# Patient Record
Sex: FEMALE | Race: OTHER | Employment: OTHER | URBAN - METROPOLITAN AREA
[De-identification: names, ages, dates, MRNs, and addresses within clinical notes are randomized per-mention and may not be internally consistent; named-entity substitution may affect disease eponyms.]

---

## 2019-03-13 ENCOUNTER — HOSPITAL ENCOUNTER (OUTPATIENT)
Age: 64
Discharge: HOME | End: 2019-03-13
Attending: EMERGENCY MEDICINE
Payer: MEDICARE

## 2019-03-13 ENCOUNTER — APPOINTMENT (OUTPATIENT)
Dept: GENERAL RADIOLOGY | Age: 64
End: 2019-03-13
Attending: EMERGENCY MEDICINE
Payer: MEDICARE

## 2019-03-13 VITALS
RESPIRATION RATE: 18 BRPM | DIASTOLIC BLOOD PRESSURE: 76 MMHG | HEART RATE: 72 BPM | SYSTOLIC BLOOD PRESSURE: 113 MMHG | TEMPERATURE: 98 F | WEIGHT: 174.63 LBS | OXYGEN SATURATION: 99 %

## 2019-03-13 DIAGNOSIS — S22.31XA CLOSED FRACTURE OF ONE RIB OF RIGHT SIDE, INITIAL ENCOUNTER: Primary | ICD-10-CM

## 2019-03-13 PROCEDURE — 71101 X-RAY EXAM UNILAT RIBS/CHEST: CPT | Performed by: EMERGENCY MEDICINE

## 2019-03-13 PROCEDURE — 99204 OFFICE O/P NEW MOD 45 MIN: CPT

## 2019-03-13 PROCEDURE — 99203 OFFICE O/P NEW LOW 30 MIN: CPT

## 2019-03-13 RX ORDER — LEVOTHYROXINE SODIUM 0.03 MG/1
TABLET ORAL
COMMUNITY
Start: 2018-12-31 | End: 2020-01-24

## 2019-03-13 RX ORDER — HYDROCODONE BITARTRATE AND ACETAMINOPHEN 10; 325 MG/1; MG/1
TABLET ORAL
COMMUNITY
Start: 2019-02-05 | End: 2020-01-24

## 2019-03-13 RX ORDER — FENOFIBRATE 145 MG/1
TABLET, COATED ORAL
COMMUNITY
Start: 2019-02-28 | End: 2020-01-24

## 2019-03-13 RX ORDER — PRAVASTATIN SODIUM 20 MG
TABLET ORAL
COMMUNITY
Start: 2018-11-15 | End: 2020-02-12

## 2019-03-13 RX ORDER — LISINOPRIL 20 MG/1
TABLET ORAL
COMMUNITY
Start: 2019-02-01 | End: 2020-01-24

## 2019-03-13 RX ORDER — DAPAGLIFLOZIN 10 MG/1
10 TABLET, FILM COATED ORAL DAILY
COMMUNITY
Start: 2019-03-05 | End: 2020-01-24

## 2019-03-13 RX ORDER — SITAGLIPTIN 100 MG/1
TABLET, FILM COATED ORAL
COMMUNITY
Start: 2019-02-02 | End: 2020-01-24

## 2019-03-13 RX ORDER — RANITIDINE 150 MG/1
150 TABLET ORAL NIGHTLY
COMMUNITY
Start: 2018-12-30 | End: 2021-02-22

## 2019-03-13 RX ORDER — ACETAMINOPHEN AND CODEINE PHOSPHATE 300; 30 MG/1; MG/1
1 TABLET ORAL EVERY 6 HOURS PRN
Qty: 12 TABLET | Refills: 0 | Status: SHIPPED | OUTPATIENT
Start: 2019-03-13 | End: 2019-03-20

## 2019-03-13 NOTE — ED PROVIDER NOTES
Patient Seen in: [facility] In [address] [place] [address]    History   Patient presents with:  Trauma (cardiovascular, musculoskeletal)    Stated Complaint: rt rib injury    HPI    Is a 59-year-old female who presents to immediate care complaining of air)       Current:/76   Pulse 72   Temp 98.4 °F (36.9 °C) (Oral)   Resp 18   Wt 79.2 kg   SpO2 99%         Physical Exam   Constitutional: She appears well-developed. HENT:   Head: Normocephalic and atraumatic.    Right Ear: External ear normal.

## 2020-01-24 ENCOUNTER — OFFICE VISIT (OUTPATIENT)
Dept: INTERNAL MEDICINE CLINIC | Facility: CLINIC | Age: 65
End: 2020-01-24
Payer: MEDICARE

## 2020-01-24 VITALS
HEART RATE: 71 BPM | TEMPERATURE: 98 F | DIASTOLIC BLOOD PRESSURE: 76 MMHG | HEIGHT: 61.5 IN | WEIGHT: 174 LBS | SYSTOLIC BLOOD PRESSURE: 127 MMHG | BODY MASS INDEX: 32.43 KG/M2

## 2020-01-24 DIAGNOSIS — Z13.6 SCREENING FOR CARDIOVASCULAR CONDITION: ICD-10-CM

## 2020-01-24 DIAGNOSIS — Z78.0 POSTMENOPAUSAL: ICD-10-CM

## 2020-01-24 DIAGNOSIS — K21.9 GASTROESOPHAGEAL REFLUX DISEASE WITHOUT ESOPHAGITIS: ICD-10-CM

## 2020-01-24 DIAGNOSIS — E11.9 TYPE 2 DIABETES MELLITUS WITHOUT COMPLICATION, WITHOUT LONG-TERM CURRENT USE OF INSULIN (HCC): ICD-10-CM

## 2020-01-24 DIAGNOSIS — Z12.4 SCREENING FOR CERVICAL CANCER: ICD-10-CM

## 2020-01-24 DIAGNOSIS — Z00.00 ENCOUNTER FOR ANNUAL HEALTH EXAMINATION: Primary | ICD-10-CM

## 2020-01-24 DIAGNOSIS — Z12.31 VISIT FOR SCREENING MAMMOGRAM: ICD-10-CM

## 2020-01-24 DIAGNOSIS — J41.0 SMOKERS' COUGH (HCC): Chronic | ICD-10-CM

## 2020-01-24 DIAGNOSIS — Z13.1 SCREENING FOR DIABETES MELLITUS (DM): ICD-10-CM

## 2020-01-24 PROBLEM — I10 ESSENTIAL HYPERTENSION: Status: ACTIVE | Noted: 2020-01-24

## 2020-01-24 PROBLEM — M19.90 ARTHRITIS: Status: ACTIVE | Noted: 2020-01-24

## 2020-01-24 PROCEDURE — 99386 PREV VISIT NEW AGE 40-64: CPT | Performed by: PHYSICIAN ASSISTANT

## 2020-01-24 PROCEDURE — G0439 PPPS, SUBSEQ VISIT: HCPCS | Performed by: PHYSICIAN ASSISTANT

## 2020-01-24 PROCEDURE — 96160 PT-FOCUSED HLTH RISK ASSMT: CPT | Performed by: PHYSICIAN ASSISTANT

## 2020-01-24 RX ORDER — GLUCOSAMINE HCL/CHONDROITIN SU 500-400 MG
CAPSULE ORAL
Qty: 100 STRIP | Refills: 11 | Status: SHIPPED | OUTPATIENT
Start: 2020-01-24 | End: 2020-04-15

## 2020-01-24 RX ORDER — FENOFIBRATE 145 MG/1
145 TABLET, COATED ORAL NIGHTLY
Qty: 90 TABLET | Refills: 0 | Status: SHIPPED | OUTPATIENT
Start: 2020-01-24 | End: 2020-05-06

## 2020-01-24 RX ORDER — HYDROCODONE BITARTRATE AND ACETAMINOPHEN 10; 325 MG/1; MG/1
1 TABLET ORAL EVERY 6 HOURS PRN
Qty: 30 TABLET | Refills: 0 | Status: SHIPPED | OUTPATIENT
Start: 2020-01-24 | End: 2020-04-03

## 2020-01-24 RX ORDER — LISINOPRIL 20 MG/1
20 TABLET ORAL DAILY
Qty: 90 TABLET | Refills: 0 | Status: SHIPPED | OUTPATIENT
Start: 2020-01-24 | End: 2020-06-30

## 2020-01-24 RX ORDER — LEVOTHYROXINE SODIUM 0.03 MG/1
25 TABLET ORAL
Qty: 90 TABLET | Refills: 0 | Status: SHIPPED | OUTPATIENT
Start: 2020-01-24 | End: 2020-05-06

## 2020-01-24 RX ORDER — DAPAGLIFLOZIN 10 MG/1
10 TABLET, FILM COATED ORAL DAILY
Qty: 90 TABLET | Refills: 0 | Status: SHIPPED | OUTPATIENT
Start: 2020-01-24 | End: 2020-05-06

## 2020-01-24 RX ORDER — SITAGLIPTIN 100 MG/1
100 TABLET, FILM COATED ORAL DAILY
Qty: 90 TABLET | Refills: 0 | Status: SHIPPED | OUTPATIENT
Start: 2020-01-24 | End: 2020-05-06

## 2020-01-24 RX ORDER — SYRING-NEEDL,DISP,INSUL,0.3 ML 30 GX5/16"
SYRINGE, EMPTY DISPOSABLE MISCELLANEOUS
Qty: 100 EACH | Refills: 11 | Status: SHIPPED | OUTPATIENT
Start: 2020-01-24

## 2020-01-24 NOTE — PROGRESS NOTES
HPI:   Ashish Palmer is a 59year old female who presents for a MA (Medicare Advantage) Supervisit (Once per calendar year). Patient presents new to the clinic for a physical exam.  Her previous physician has retired. Patient is overall doing well. tobacco: Never Used      Tobacco comment: 2 cig a week      This is a tobacco user, and I will give tobacco cessation counseling today.  (update Vitals or Tob Hx section to tim Tobacco Cessation counseling given as YES and refresh this link)          CAGE E11.9 non-insulin  raNITIdine HCl 150 MG Oral Tab,        MEDICAL INFORMATION:   She  has a past medical history of Arthritis, Esophageal reflux, Essential hypertension, Thyroid disease, and Type 1 diabetes mellitus ([facility] [address]     She  has a past surgical histo come from:  No I misunderstand some words in a sentence and need to ask people to repeat themselves:  No   I especially have trouble understanding the speech of women and children:  No I have trouble understanding the speaker in a large room such as at a m 10/13/2015   • Influenza 09/22/2014, 09/07/2016, 01/08/2018   • Pneumovax 23 08/28/2014, 09/07/2016        ASSESSMENT AND OTHER RELEVANT CHRONIC CONDITIONS:   Merissa West is a 59year old female who presents for a Medicare Assessment.      PLAN SUMMARY: metFORMIN HCl 1000 MG Oral Tab; Take 1 tablet (1,000 mg total) by mouth 2 (two) times daily with meals.  -     Glucose Blood (BLOOD GLUCOSE TEST) In Vitro Strip;  Check blood glucose once daily Dx E11.9, non-insulin  -     Blood Glucose Monitoring Suppl w/D Ophthalmology Visit Annually: Diabetics, FHx Glaucoma, AA>50, > 65 No flowsheet data found. Bone Density Screening      Dexascan Every two years No results found for this or any previous visit. No flowsheet data found.     Pap and Pelvic      Pap No flowsheet data found. Creatinine  Annually No results found for: CREATSERUM No flowsheet data found. Drug Serum Conc  Annually No results found for: DIGOXIN, DIG, VALP No flowsheet data found.           COPD      Spirometry Testing Annually Spirome

## 2020-01-24 NOTE — PATIENT INSTRUCTIONS
Jesenia Nelson's SCREENING SCHEDULE   Tests on this list are recommended by your physician but may not be covered, or covered at this frequency, by your insurer. Please check with your insurance carrier before scheduling to verify coverage.    PREVENTATI found for this or any previous visit. No flowsheet data found. Fecal Occult Blood   Covered Annually No results found for: FOB, OCCULTSTOOL No flowsheet data found.      Barium Enema-   uncomfortable but covered  Covered but uncomfortable   Glaucoma Scr factors:   End-stage renal disease   Hemophiliacs who received Factor VIII or IX concentrates   Clients of institutions for the mentally retarded   Persons who live in the same house as a HepB virus carrier   Homosexual men   Illicit injectable drug abuser

## 2020-01-25 ENCOUNTER — LAB ENCOUNTER (OUTPATIENT)
Dept: LAB | Age: 65
End: 2020-01-25
Attending: PHYSICIAN ASSISTANT
Payer: MEDICARE

## 2020-01-25 DIAGNOSIS — E11.9 TYPE 2 DIABETES MELLITUS WITHOUT COMPLICATION, WITHOUT LONG-TERM CURRENT USE OF INSULIN (HCC): ICD-10-CM

## 2020-01-25 DIAGNOSIS — Z13.1 SCREENING FOR DIABETES MELLITUS (DM): ICD-10-CM

## 2020-01-25 DIAGNOSIS — Z13.6 SCREENING FOR CARDIOVASCULAR CONDITION: ICD-10-CM

## 2020-01-25 DIAGNOSIS — Z00.00 ENCOUNTER FOR ANNUAL HEALTH EXAMINATION: ICD-10-CM

## 2020-01-25 LAB
ALBUMIN SERPL-MCNC: 4.1 G/DL (ref 3.4–5)
ALBUMIN/GLOB SERPL: 1.1 {RATIO} (ref 1–2)
ALP LIVER SERPL-CCNC: 72 U/L (ref 50–130)
ALT SERPL-CCNC: 41 U/L (ref 13–56)
ANION GAP SERPL CALC-SCNC: 5 MMOL/L (ref 0–18)
AST SERPL-CCNC: 30 U/L (ref 15–37)
BASOPHILS # BLD AUTO: 0.04 X10(3) UL (ref 0–0.2)
BASOPHILS NFR BLD AUTO: 0.6 %
BILIRUB SERPL-MCNC: 0.5 MG/DL (ref 0.1–2)
BUN BLD-MCNC: 13 MG/DL (ref 7–18)
BUN/CREAT SERPL: 17.6 (ref 10–20)
CALCIUM BLD-MCNC: 9.4 MG/DL (ref 8.5–10.1)
CHLORIDE SERPL-SCNC: 106 MMOL/L (ref 98–112)
CHOLEST SMN-MCNC: 159 MG/DL (ref ?–200)
CO2 SERPL-SCNC: 29 MMOL/L (ref 21–32)
CREAT BLD-MCNC: 0.74 MG/DL (ref 0.55–1.02)
CREAT UR-SCNC: 97.1 MG/DL
DEPRECATED RDW RBC AUTO: 43.8 FL (ref 35.1–46.3)
EOSINOPHIL # BLD AUTO: 0.14 X10(3) UL (ref 0–0.7)
EOSINOPHIL NFR BLD AUTO: 2.3 %
ERYTHROCYTE [DISTWIDTH] IN BLOOD BY AUTOMATED COUNT: 13.3 % (ref 11–15)
EST. AVERAGE GLUCOSE BLD GHB EST-MCNC: 143 MG/DL (ref 68–126)
GLOBULIN PLAS-MCNC: 3.6 G/DL (ref 2.8–4.4)
GLUCOSE BLD-MCNC: 126 MG/DL (ref 70–99)
HBA1C MFR BLD HPLC: 6.6 % (ref ?–5.7)
HCT VFR BLD AUTO: 43.6 % (ref 35–48)
HDLC SERPL-MCNC: 40 MG/DL (ref 40–59)
HGB BLD-MCNC: 14.4 G/DL (ref 12–16)
IMM GRANULOCYTES # BLD AUTO: 0.05 X10(3) UL (ref 0–1)
IMM GRANULOCYTES NFR BLD: 0.8 %
LDLC SERPL CALC-MCNC: 76 MG/DL (ref ?–100)
LYMPHOCYTES # BLD AUTO: 2.13 X10(3) UL (ref 1–4)
LYMPHOCYTES NFR BLD AUTO: 34.5 %
M PROTEIN MFR SERPL ELPH: 7.7 G/DL (ref 6.4–8.2)
MCH RBC QN AUTO: 29.8 PG (ref 26–34)
MCHC RBC AUTO-ENTMCNC: 33 G/DL (ref 31–37)
MCV RBC AUTO: 90.3 FL (ref 80–100)
MICROALBUMIN UR-MCNC: 0.84 MG/DL
MICROALBUMIN/CREAT 24H UR-RTO: 8.7 UG/MG (ref ?–30)
MONOCYTES # BLD AUTO: 0.48 X10(3) UL (ref 0.1–1)
MONOCYTES NFR BLD AUTO: 7.8 %
NEUTROPHILS # BLD AUTO: 3.33 X10 (3) UL (ref 1.5–7.7)
NEUTROPHILS # BLD AUTO: 3.33 X10(3) UL (ref 1.5–7.7)
NEUTROPHILS NFR BLD AUTO: 54 %
NONHDLC SERPL-MCNC: 119 MG/DL (ref ?–130)
OSMOLALITY SERPL CALC.SUM OF ELEC: 292 MOSM/KG (ref 275–295)
PATIENT FASTING Y/N/NP: YES
PATIENT FASTING Y/N/NP: YES
PLATELET # BLD AUTO: 234 10(3)UL (ref 150–450)
POTASSIUM SERPL-SCNC: 4.1 MMOL/L (ref 3.5–5.1)
RBC # BLD AUTO: 4.83 X10(6)UL (ref 3.8–5.3)
SODIUM SERPL-SCNC: 140 MMOL/L (ref 136–145)
TRIGL SERPL-MCNC: 214 MG/DL (ref 30–149)
TSI SER-ACNC: 3.71 MIU/ML (ref 0.36–3.74)
VLDLC SERPL CALC-MCNC: 43 MG/DL (ref 0–30)
WBC # BLD AUTO: 6.2 X10(3) UL (ref 4–11)

## 2020-01-25 PROCEDURE — 85025 COMPLETE CBC W/AUTO DIFF WBC: CPT

## 2020-01-25 PROCEDURE — 80053 COMPREHEN METABOLIC PANEL: CPT

## 2020-01-25 PROCEDURE — 83036 HEMOGLOBIN GLYCOSYLATED A1C: CPT

## 2020-01-25 PROCEDURE — 82043 UR ALBUMIN QUANTITATIVE: CPT

## 2020-01-25 PROCEDURE — 36415 COLL VENOUS BLD VENIPUNCTURE: CPT

## 2020-01-25 PROCEDURE — 84443 ASSAY THYROID STIM HORMONE: CPT

## 2020-01-25 PROCEDURE — 82570 ASSAY OF URINE CREATININE: CPT

## 2020-01-25 PROCEDURE — 80061 LIPID PANEL: CPT

## 2020-02-12 ENCOUNTER — OFFICE VISIT (OUTPATIENT)
Dept: OBGYN CLINIC | Facility: CLINIC | Age: 65
End: 2020-02-12
Payer: MEDICARE

## 2020-02-12 VITALS
HEIGHT: 61.5 IN | WEIGHT: 171.38 LBS | DIASTOLIC BLOOD PRESSURE: 70 MMHG | BODY MASS INDEX: 31.94 KG/M2 | SYSTOLIC BLOOD PRESSURE: 112 MMHG

## 2020-02-12 DIAGNOSIS — Z01.419 WELL WOMAN EXAM WITH ROUTINE GYNECOLOGICAL EXAM: Primary | ICD-10-CM

## 2020-02-12 DIAGNOSIS — Z01.419 ENCOUNTER FOR GYNECOLOGICAL EXAMINATION WITHOUT ABNORMAL FINDING: ICD-10-CM

## 2020-02-12 PROCEDURE — G0101 CA SCREEN;PELVIC/BREAST EXAM: HCPCS | Performed by: OBSTETRICS & GYNECOLOGY

## 2020-02-12 RX ORDER — LANCETS
EACH MISCELLANEOUS
COMMUNITY
Start: 2020-01-24 | End: 2020-06-30

## 2020-02-12 NOTE — PROGRESS NOTES
HPI:    Patient ID: Raghav Tate is a 59year old female. Patient here for routine exam.  No C/Os. New to office. Patient states that she has neve had an abnormal pap smear. Discussed that if this pap is WNL then it will be her last pap.   Has Mamm Exam   Constitutional: She is oriented to person, place, and time. She appears well-developed and well-nourished. HENT:   Head: Normocephalic. Neck: Normal range of motion. Neck supple. No thyromegaly present.    Cardiovascular: Normal rate, regular rhy

## 2020-02-13 LAB — HPV I/H RISK 1 DNA SPEC QL NAA+PROBE: NEGATIVE

## 2020-03-14 ENCOUNTER — HOSPITAL ENCOUNTER (EMERGENCY)
Facility: HOSPITAL | Age: 65
Discharge: STILL ADMITTED OR ADMINISTRATIVE | End: 2020-03-14
Payer: MEDICARE

## 2020-03-17 ENCOUNTER — HOSPITAL ENCOUNTER (OUTPATIENT)
Dept: BONE DENSITY | Facility: HOSPITAL | Age: 65
Discharge: HOME | End: 2020-03-17
Attending: PHYSICIAN ASSISTANT
Payer: MEDICARE

## 2020-03-17 ENCOUNTER — HOSPITAL ENCOUNTER (OUTPATIENT)
Dept: MAMMOGRAPHY | Facility: HOSPITAL | Age: 65
Discharge: HOME | End: 2020-03-17
Attending: PHYSICIAN ASSISTANT
Payer: MEDICARE

## 2020-03-17 DIAGNOSIS — Z78.0 POSTMENOPAUSAL: ICD-10-CM

## 2020-03-17 DIAGNOSIS — Z12.31 VISIT FOR SCREENING MAMMOGRAM: ICD-10-CM

## 2020-03-17 PROCEDURE — 77063 BREAST TOMOSYNTHESIS BI: CPT | Performed by: PHYSICIAN ASSISTANT

## 2020-03-17 PROCEDURE — 77067 SCR MAMMO BI INCL CAD: CPT | Performed by: PHYSICIAN ASSISTANT

## 2020-03-17 PROCEDURE — 77080 DXA BONE DENSITY AXIAL: CPT | Performed by: PHYSICIAN ASSISTANT

## 2020-03-24 ENCOUNTER — TELEPHONE (OUTPATIENT)
Dept: INTERNAL MEDICINE CLINIC | Facility: CLINIC | Age: 65
End: 2020-03-24

## 2020-03-24 DIAGNOSIS — R39.9 UTI SYMPTOMS: Primary | ICD-10-CM

## 2020-03-24 NOTE — TELEPHONE ENCOUNTER
Spoke with patient, relayed message below from PCP. Patient verbalized understanding in agreement with plan. Will obtain previous mammogram and take record to our mammogram dept.

## 2020-03-24 NOTE — TELEPHONE ENCOUNTER
Contacted patient name and  verified, relayed message below to patient. Patient states will try and obtain records from the [facility] she had her last mammogram at. Per patient will drop off records as soon as she obtains them.        While on

## 2020-03-24 NOTE — TELEPHONE ENCOUNTER
Notes recorded by Melissa Jones PA-C on 3/17/2020 at 1:02 PM CDT  Please call pt and see if she can get her old imaging to the mammogram department for comparison

## 2020-03-24 NOTE — TELEPHONE ENCOUNTER
Pt does not need apt, advise to leave urine sample at open lab, I will check and call pt. Drink plenty of water. Mammogram records need to be sent to our mammogram dept for comparison.

## 2020-04-03 RX ORDER — HYDROCODONE BITARTRATE AND ACETAMINOPHEN 10; 325 MG/1; MG/1
1 TABLET ORAL EVERY 6 HOURS PRN
Qty: 30 TABLET | Refills: 0 | Status: SHIPPED | OUTPATIENT
Start: 2020-04-03 | End: 2020-05-15

## 2020-04-03 NOTE — TELEPHONE ENCOUNTER
This is being sent to you without review by the Triage staff due to the high call volumes created by the COVID-19 virus, per the email sent by Dr. Sammi Gardner on 3/19/20. Thank you for your support.     Centralized Nurse Triage Team

## 2020-04-03 NOTE — TELEPHONE ENCOUNTER
Patient speaks Nepali. Patient requesting a refill. HYDROcodone-acetaminophen  MG Oral Tab 30 tablet 0 1/24/2020    Sig:   Take 1 tablet by mouth every 6 (six) hours as needed for Pain.      Route:   Oral     PRN Reason(s):   Pain     Earliest

## 2020-04-06 ENCOUNTER — NURSE TRIAGE (OUTPATIENT)
Dept: INTERNAL MEDICINE CLINIC | Facility: CLINIC | Age: 65
End: 2020-04-06

## 2020-04-06 NOTE — TELEPHONE ENCOUNTER
I have tried to reach pt, called twice. Will try again in the office this after noon.  If she does call back please advise for her to stay isolation for the next 14 days, she does not qualify for testing but can presume positive, monitor sx and if worsen ca

## 2020-04-06 NOTE — TELEPHONE ENCOUNTER
Action Requested: Summary for Provider     []  Critical Lab, Recommendations Needed  [] Need Additional Advice  []   FYI    []   Need Orders  [] Need Medications Sent to Pharmacy  []  Other     SUMMARY: A Moncho, please advise.     Patient's symptoms indicat AND [3] NO cough or fever or breathing difficulty    Protocols used: CORONAVIRUS (COVID-19) EXPOSURE-A-OH

## 2020-04-14 ENCOUNTER — TELEPHONE (OUTPATIENT)
Dept: INTERNAL MEDICINE CLINIC | Facility: CLINIC | Age: 65
End: 2020-04-14

## 2020-04-15 RX ORDER — GLUCOSAMINE HCL/CHONDROITIN SU 500-400 MG
CAPSULE ORAL
Qty: 100 STRIP | Refills: 11 | Status: SHIPPED | OUTPATIENT
Start: 2020-04-15 | End: 2021-02-22

## 2020-04-15 NOTE — TELEPHONE ENCOUNTER
Spoke to pt and discussed that she tested covid positive. She is asx, discussed staying home.  Also needed glucose strips which I sent

## 2020-05-06 ENCOUNTER — TELEPHONE (OUTPATIENT)
Dept: INTERNAL MEDICINE CLINIC | Facility: CLINIC | Age: 65
End: 2020-05-06

## 2020-05-06 RX ORDER — DAPAGLIFLOZIN 10 MG/1
10 TABLET, FILM COATED ORAL DAILY
Qty: 90 TABLET | Refills: 0 | Status: SHIPPED | OUTPATIENT
Start: 2020-05-06 | End: 2020-10-06

## 2020-05-06 RX ORDER — SITAGLIPTIN 100 MG/1
100 TABLET, FILM COATED ORAL DAILY
Qty: 90 TABLET | Refills: 0 | Status: SHIPPED | OUTPATIENT
Start: 2020-05-06 | End: 2020-10-06

## 2020-05-06 RX ORDER — LEVOTHYROXINE SODIUM 0.03 MG/1
25 TABLET ORAL
Qty: 90 TABLET | Refills: 0 | Status: SHIPPED | OUTPATIENT
Start: 2020-05-06 | End: 2020-12-11

## 2020-05-06 RX ORDER — FENOFIBRATE 145 MG/1
145 TABLET, COATED ORAL NIGHTLY
Qty: 90 TABLET | Refills: 0 | Status: SHIPPED | OUTPATIENT
Start: 2020-05-06 | End: 2020-10-07

## 2020-05-06 NOTE — TELEPHONE ENCOUNTER
Current Outpatient Medications:   •  FARXIGA 10 MG Oral Tab, Take 10 mg by mouth daily. , Disp: 90 tablet, Rfl: 0  •  Fenofibrate 145 MG Oral Tab, Take 1 tablet (145 mg total) by mouth nightly., Disp: 90 tablet, Rfl: 0  •  Levothyroxine Sodium 25 MCG

## 2020-05-15 ENCOUNTER — TELEPHONE (OUTPATIENT)
Dept: INTERNAL MEDICINE CLINIC | Facility: CLINIC | Age: 65
End: 2020-05-15

## 2020-05-15 RX ORDER — HYDROCODONE BITARTRATE AND ACETAMINOPHEN 10; 325 MG/1; MG/1
1 TABLET ORAL EVERY 6 HOURS PRN
Qty: 30 TABLET | Refills: 0 | Status: SHIPPED | OUTPATIENT
Start: 2020-05-15 | End: 2020-09-14

## 2020-05-15 NOTE — TELEPHONE ENCOUNTER
Patient requesting refill, also states she missed call from PA, no notes on chart.  Please advice     HYDROcodone-acetaminophen  MG Oral Tab

## 2020-05-28 ENCOUNTER — TELEPHONE (OUTPATIENT)
Dept: INTERNAL MEDICINE CLINIC | Facility: CLINIC | Age: 65
End: 2020-05-28

## 2020-05-28 NOTE — TELEPHONE ENCOUNTER
Patient states she has gone to pharmacy twice and they told her they did not have medication, asking if it can be re sent.      HYDROcodone-acetaminophen  MG Oral Tab

## 2020-05-29 ENCOUNTER — HOSPITAL ENCOUNTER (OUTPATIENT)
Dept: MAMMOGRAPHY | Facility: HOSPITAL | Age: 65
Discharge: HOME | End: 2020-05-29
Attending: PHYSICIAN ASSISTANT
Payer: MEDICARE

## 2020-05-29 DIAGNOSIS — R92.8 ABNORMAL MAMMOGRAM: ICD-10-CM

## 2020-05-29 PROCEDURE — 77065 DX MAMMO INCL CAD UNI: CPT | Performed by: PHYSICIAN ASSISTANT

## 2020-05-29 PROCEDURE — 77061 BREAST TOMOSYNTHESIS UNI: CPT | Performed by: PHYSICIAN ASSISTANT

## 2020-06-30 ENCOUNTER — TELEPHONE (OUTPATIENT)
Dept: INTERNAL MEDICINE CLINIC | Facility: CLINIC | Age: 65
End: 2020-06-30

## 2020-06-30 RX ORDER — LANCETS
EACH MISCELLANEOUS
Qty: 100 EACH | Refills: 0 | Status: SHIPPED | OUTPATIENT
Start: 2020-06-30 | End: 2020-08-15

## 2020-06-30 RX ORDER — LISINOPRIL 20 MG/1
20 TABLET ORAL DAILY
Qty: 90 TABLET | Refills: 0 | Status: SHIPPED | OUTPATIENT
Start: 2020-06-30 | End: 2020-08-23

## 2020-06-30 NOTE — TELEPHONE ENCOUNTER
90 day supply. Current Outpatient Medications:   •  •  lisinopril 20 MG Oral Tab, Take 1 tablet (20 mg total) by mouth daily. , Disp: 90 tablet, Rfl: 0

## 2020-08-15 RX ORDER — LANCETS
EACH MISCELLANEOUS
Qty: 100 EACH | Refills: 3 | Status: SHIPPED | OUTPATIENT
Start: 2020-08-15 | End: 2020-12-18

## 2020-08-15 NOTE — TELEPHONE ENCOUNTER
Diabetic Supplies  Protocol Criteria:  · Appointment scheduled in past 12 months or the next 3 months  1/24/20

## 2020-08-23 RX ORDER — LISINOPRIL 20 MG/1
TABLET ORAL
Qty: 30 TABLET | Refills: 0 | Status: SHIPPED | OUTPATIENT
Start: 2020-08-23 | End: 2020-12-29

## 2020-09-14 ENCOUNTER — OFFICE VISIT (OUTPATIENT)
Dept: FAMILY MEDICINE CLINIC | Facility: CLINIC | Age: 65
End: 2020-09-14
Payer: MEDICARE

## 2020-09-14 VITALS
TEMPERATURE: 97 F | HEIGHT: 61.5 IN | BODY MASS INDEX: 33.18 KG/M2 | HEART RATE: 67 BPM | WEIGHT: 178 LBS | DIASTOLIC BLOOD PRESSURE: 78 MMHG | SYSTOLIC BLOOD PRESSURE: 135 MMHG

## 2020-09-14 DIAGNOSIS — M19.90 ARTHRITIS: ICD-10-CM

## 2020-09-14 DIAGNOSIS — I10 ESSENTIAL HYPERTENSION: ICD-10-CM

## 2020-09-14 DIAGNOSIS — E03.9 HYPOTHYROIDISM, UNSPECIFIED TYPE: ICD-10-CM

## 2020-09-14 DIAGNOSIS — Z76.89 ENCOUNTER TO ESTABLISH CARE: Primary | ICD-10-CM

## 2020-09-14 DIAGNOSIS — E11.9 TYPE 2 DIABETES MELLITUS WITHOUT COMPLICATION, WITHOUT LONG-TERM CURRENT USE OF INSULIN (HCC): ICD-10-CM

## 2020-09-14 PROBLEM — E07.9 THYROID DISEASE: Status: ACTIVE | Noted: 2020-09-14

## 2020-09-14 PROBLEM — E03.8 OTHER SPECIFIED HYPOTHYROIDISM: Status: ACTIVE | Noted: 2020-09-14

## 2020-09-14 PROBLEM — E07.9 THYROID DISEASE: Chronic | Status: ACTIVE | Noted: 2020-09-14

## 2020-09-14 PROBLEM — E78.2 MIXED HYPERLIPIDEMIA: Chronic | Status: ACTIVE | Noted: 2020-09-14

## 2020-09-14 PROBLEM — K21.9 ESOPHAGEAL REFLUX: Status: ACTIVE | Noted: 2020-01-24

## 2020-09-14 PROBLEM — E78.2 MIXED HYPERLIPIDEMIA: Status: ACTIVE | Noted: 2020-09-14

## 2020-09-14 PROCEDURE — 99204 OFFICE O/P NEW MOD 45 MIN: CPT | Performed by: FAMILY MEDICINE

## 2020-09-14 PROCEDURE — 3008F BODY MASS INDEX DOCD: CPT | Performed by: FAMILY MEDICINE

## 2020-09-14 PROCEDURE — 3078F DIAST BP <80 MM HG: CPT | Performed by: FAMILY MEDICINE

## 2020-09-14 PROCEDURE — 3075F SYST BP GE 130 - 139MM HG: CPT | Performed by: FAMILY MEDICINE

## 2020-09-14 RX ORDER — PRAVASTATIN SODIUM 20 MG
20 TABLET ORAL NIGHTLY
COMMUNITY
Start: 2020-08-24 | End: 2021-07-22

## 2020-09-14 RX ORDER — HYDROCODONE BITARTRATE AND ACETAMINOPHEN 10; 325 MG/1; MG/1
1 TABLET ORAL EVERY 6 HOURS PRN
Qty: 30 TABLET | Refills: 0 | Status: SHIPPED | OUTPATIENT
Start: 2020-09-14 | End: 2020-12-11

## 2020-09-14 NOTE — PROGRESS NOTES
NEW PT.    HPI:   Luciano Daily is a 72year old female who presents for an establish care visit. Patient has a history of arthritis, hypertension, hyperlipidemia, type 2 diabetes and reflux.     She states that she is compliant with taking her medicatio Past Medical History:   Diagnosis Date   • Arthritis    • Esophageal reflux    • Essential hypertension    • Thyroid disease    • Type 2 diabetes mellitus without complication, without long-term current use of insulin ([facility] 1/24/2020      Past Surgica hypertension  -Blood pressure well controlled today. However patient states she did not take her blood pressure medication today    3.  Type 2 diabetes mellitus without complication, without long-term current use of insulin (HCC)  - COMP METABOLIC PANEL (1

## 2020-09-16 ENCOUNTER — LAB ENCOUNTER (OUTPATIENT)
Dept: LAB | Age: 65
End: 2020-09-16
Attending: FAMILY MEDICINE
Payer: MEDICARE

## 2020-09-16 DIAGNOSIS — E03.9 HYPOTHYROIDISM, UNSPECIFIED TYPE: ICD-10-CM

## 2020-09-16 DIAGNOSIS — E11.9 TYPE 2 DIABETES MELLITUS WITHOUT COMPLICATION, WITHOUT LONG-TERM CURRENT USE OF INSULIN (HCC): ICD-10-CM

## 2020-09-16 LAB
ALBUMIN SERPL-MCNC: 3.9 G/DL (ref 3.4–5)
ALBUMIN/GLOB SERPL: 1.1 {RATIO} (ref 1–2)
ALP LIVER SERPL-CCNC: 93 U/L (ref 50–130)
ALT SERPL-CCNC: 47 U/L (ref 13–56)
ANION GAP SERPL CALC-SCNC: 7 MMOL/L (ref 0–18)
AST SERPL-CCNC: 35 U/L (ref 15–37)
BILIRUB SERPL-MCNC: 0.4 MG/DL (ref 0.1–2)
BUN BLD-MCNC: 11 MG/DL (ref 7–18)
BUN/CREAT SERPL: 15.9 (ref 10–20)
CALCIUM BLD-MCNC: 9.4 MG/DL (ref 8.5–10.1)
CHLORIDE SERPL-SCNC: 106 MMOL/L (ref 98–112)
CHOLEST SMN-MCNC: 158 MG/DL (ref ?–200)
CO2 SERPL-SCNC: 26 MMOL/L (ref 21–32)
CREAT BLD-MCNC: 0.69 MG/DL (ref 0.55–1.02)
CREAT UR-SCNC: 78 MG/DL
EST. AVERAGE GLUCOSE BLD GHB EST-MCNC: 120 MG/DL (ref 68–126)
GLOBULIN PLAS-MCNC: 3.7 G/DL (ref 2.8–4.4)
GLUCOSE BLD-MCNC: 109 MG/DL (ref 70–99)
HBA1C MFR BLD HPLC: 5.8 % (ref ?–5.7)
HDLC SERPL-MCNC: 40 MG/DL (ref 40–59)
LDLC SERPL CALC-MCNC: 75 MG/DL (ref ?–100)
M PROTEIN MFR SERPL ELPH: 7.6 G/DL (ref 6.4–8.2)
MICROALBUMIN UR-MCNC: 0.73 MG/DL
MICROALBUMIN/CREAT 24H UR-RTO: 9.4 UG/MG (ref ?–30)
NONHDLC SERPL-MCNC: 118 MG/DL (ref ?–130)
OSMOLALITY SERPL CALC.SUM OF ELEC: 288 MOSM/KG (ref 275–295)
PATIENT FASTING Y/N/NP: YES
PATIENT FASTING Y/N/NP: YES
POTASSIUM SERPL-SCNC: 4 MMOL/L (ref 3.5–5.1)
SODIUM SERPL-SCNC: 139 MMOL/L (ref 136–145)
T4 FREE SERPL-MCNC: 1 NG/DL (ref 0.8–1.7)
TRIGL SERPL-MCNC: 214 MG/DL (ref 30–149)
TSI SER-ACNC: 4.63 MIU/ML (ref 0.36–3.74)
VLDLC SERPL CALC-MCNC: 43 MG/DL (ref 0–30)

## 2020-09-16 PROCEDURE — 36415 COLL VENOUS BLD VENIPUNCTURE: CPT

## 2020-09-16 PROCEDURE — 82570 ASSAY OF URINE CREATININE: CPT

## 2020-09-16 PROCEDURE — 80053 COMPREHEN METABOLIC PANEL: CPT

## 2020-09-16 PROCEDURE — 84439 ASSAY OF FREE THYROXINE: CPT

## 2020-09-16 PROCEDURE — 84443 ASSAY THYROID STIM HORMONE: CPT

## 2020-09-16 PROCEDURE — 80061 LIPID PANEL: CPT

## 2020-09-16 PROCEDURE — 83036 HEMOGLOBIN GLYCOSYLATED A1C: CPT

## 2020-09-16 PROCEDURE — 82043 UR ALBUMIN QUANTITATIVE: CPT

## 2020-09-18 ENCOUNTER — TELEPHONE (OUTPATIENT)
Dept: FAMILY MEDICINE CLINIC | Facility: CLINIC | Age: 65
End: 2020-09-18

## 2020-09-18 RX ORDER — NITROFURANTOIN 25; 75 MG/1; MG/1
100 CAPSULE ORAL 2 TIMES DAILY
Qty: 10 CAPSULE | Refills: 0 | Status: SHIPPED | OUTPATIENT
Start: 2020-09-18 | End: 2020-09-23

## 2020-09-18 NOTE — TELEPHONE ENCOUNTER
Called patient about lab work. States that she is having some dysuria and taking Azo which helps. Advised her that I will send Macrobid to the pharmacy for her to start taking.   The next time she has a UTI/urinary symptoms needs to come in for a urine cu

## 2020-10-05 ENCOUNTER — LAB ENCOUNTER (OUTPATIENT)
Dept: LAB | Facility: HOSPITAL | Age: 65
End: 2020-10-05
Attending: INTERNAL MEDICINE
Payer: MEDICARE

## 2020-10-05 ENCOUNTER — OFFICE VISIT (OUTPATIENT)
Dept: RHEUMATOLOGY | Facility: CLINIC | Age: 65
End: 2020-10-05
Payer: MEDICARE

## 2020-10-05 ENCOUNTER — HOSPITAL ENCOUNTER (OUTPATIENT)
Dept: GENERAL RADIOLOGY | Facility: HOSPITAL | Age: 65
Discharge: HOME | End: 2020-10-05
Attending: INTERNAL MEDICINE
Payer: MEDICARE

## 2020-10-05 VITALS
SYSTOLIC BLOOD PRESSURE: 107 MMHG | HEIGHT: 61.5 IN | WEIGHT: 178.5 LBS | BODY MASS INDEX: 33.27 KG/M2 | HEART RATE: 79 BPM | DIASTOLIC BLOOD PRESSURE: 74 MMHG

## 2020-10-05 DIAGNOSIS — M10.09 ACUTE IDIOPATHIC GOUT OF MULTIPLE SITES: ICD-10-CM

## 2020-10-05 DIAGNOSIS — M25.50 ARTHRALGIA, UNSPECIFIED JOINT: ICD-10-CM

## 2020-10-05 DIAGNOSIS — M13.0 POLYARTHRITIS: Primary | ICD-10-CM

## 2020-10-05 DIAGNOSIS — M13.0 POLYARTHRITIS: ICD-10-CM

## 2020-10-05 PROCEDURE — 36415 COLL VENOUS BLD VENIPUNCTURE: CPT

## 2020-10-05 PROCEDURE — 85652 RBC SED RATE AUTOMATED: CPT

## 2020-10-05 PROCEDURE — 86200 CCP ANTIBODY: CPT

## 2020-10-05 PROCEDURE — 3008F BODY MASS INDEX DOCD: CPT | Performed by: INTERNAL MEDICINE

## 2020-10-05 PROCEDURE — 84550 ASSAY OF BLOOD/URIC ACID: CPT

## 2020-10-05 PROCEDURE — 3078F DIAST BP <80 MM HG: CPT | Performed by: INTERNAL MEDICINE

## 2020-10-05 PROCEDURE — 86140 C-REACTIVE PROTEIN: CPT

## 2020-10-05 PROCEDURE — 86431 RHEUMATOID FACTOR QUANT: CPT

## 2020-10-05 PROCEDURE — 99204 OFFICE O/P NEW MOD 45 MIN: CPT | Performed by: INTERNAL MEDICINE

## 2020-10-05 PROCEDURE — 3074F SYST BP LT 130 MM HG: CPT | Performed by: INTERNAL MEDICINE

## 2020-10-05 PROCEDURE — 73130 X-RAY EXAM OF HAND: CPT | Performed by: INTERNAL MEDICINE

## 2020-10-05 RX ORDER — DICLOFENAC SODIUM 75 MG/1
TABLET, DELAYED RELEASE ORAL 2 TIMES DAILY
COMMUNITY
Start: 2020-09-11 | End: 2020-10-19

## 2020-10-05 NOTE — PROGRESS NOTES
Dear Dr. Lili Mistry:    I saw your patient Pieter Gordillo in consultation this afternoon at your request for evaluation of polyarthritis. 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   An  was on the line during my his infrequently, levothyroxine, lisinopril, metformin, pravastatin, ranitidine, sitagliptin. She is intolerant of ibuprofen which caused bruising of her eyelids and upset her stomach. Family history:  She has not hands with bad arthritis.   She does not kn move well. Surgical scars over both knees with good flexion. Ankles move okay. The balls of her feet are nontender to squeeze or not obviously inflamed. Assessment and plan:    1. Diffuse osteoarthritis. Well-documented.   I gave her an article in S

## 2020-10-05 NOTE — TELEPHONE ENCOUNTER
Patient is requesting a refill. JANUVIA 100 MG Oral Tab 90 tablet 0 5/6/2020    Sig:   Take 1 tablet (100 mg total) by mouth daily.      Route: Thom Mcdonald     Order #:   [id number]       metFORMIN HCl 1000 MG Oral Tab 180 tablet 0 5/6/2020    S

## 2020-10-06 RX ORDER — SITAGLIPTIN 100 MG/1
100 TABLET, FILM COATED ORAL DAILY
Qty: 90 TABLET | Refills: 1 | Status: SHIPPED | OUTPATIENT
Start: 2020-10-06 | End: 2021-02-22

## 2020-10-06 RX ORDER — DAPAGLIFLOZIN 10 MG/1
10 TABLET, FILM COATED ORAL DAILY
Qty: 90 TABLET | Refills: 1 | Status: SHIPPED | OUTPATIENT
Start: 2020-10-06 | End: 2021-02-22

## 2020-10-07 ENCOUNTER — TELEPHONE (OUTPATIENT)
Dept: FAMILY MEDICINE CLINIC | Facility: CLINIC | Age: 65
End: 2020-10-07

## 2020-10-07 RX ORDER — FENOFIBRATE 145 MG/1
145 TABLET, COATED ORAL NIGHTLY
Qty: 90 TABLET | Refills: 1 | Status: SHIPPED | OUTPATIENT
Start: 2020-10-07 | End: 2021-09-22

## 2020-10-07 NOTE — TELEPHONE ENCOUNTER
Patient states she was advised by her healthcare plan that  is not in network with them, clinic is but not Dr. Karen Steward if there is something that can be done. State she really likes  and would not like to change provider. Was given a phone number  can call to get more information [phone].  Please advise

## 2020-10-08 NOTE — TELEPHONE ENCOUNTER
Phone number given is a series of offers for home warranty, winning gift cards, and other prices. Per MDs profile she accepts [employer] MA HMO, [employer] and [employer] PPO. Patient has been seen before and there were no issues then.

## 2020-10-19 ENCOUNTER — OFFICE VISIT (OUTPATIENT)
Dept: RHEUMATOLOGY | Facility: CLINIC | Age: 65
End: 2020-10-19
Payer: MEDICARE

## 2020-10-19 VITALS
HEIGHT: 61.5 IN | WEIGHT: 178.19 LBS | HEART RATE: 64 BPM | DIASTOLIC BLOOD PRESSURE: 72 MMHG | BODY MASS INDEX: 33.21 KG/M2 | SYSTOLIC BLOOD PRESSURE: 109 MMHG

## 2020-10-19 DIAGNOSIS — M15.9 PRIMARY OSTEOARTHRITIS INVOLVING MULTIPLE JOINTS: Primary | ICD-10-CM

## 2020-10-19 PROCEDURE — 99213 OFFICE O/P EST LOW 20 MIN: CPT | Performed by: INTERNAL MEDICINE

## 2020-10-19 PROCEDURE — 3008F BODY MASS INDEX DOCD: CPT | Performed by: INTERNAL MEDICINE

## 2020-10-19 PROCEDURE — 3078F DIAST BP <80 MM HG: CPT | Performed by: INTERNAL MEDICINE

## 2020-10-19 PROCEDURE — 3074F SYST BP LT 130 MM HG: CPT | Performed by: INTERNAL MEDICINE

## 2020-10-19 NOTE — PROGRESS NOTES
Dear Dr. Sandie Nicolas:    I saw your patient Mirna Oviedo in follow-up this morning in my rheumatology clinic. A  is again on the line with us. She is taking diclofenac orally 75 mg twice a day. It helps minimally.   She continues to [employer]

## 2020-11-09 ENCOUNTER — OFFICE VISIT (OUTPATIENT)
Dept: FAMILY MEDICINE CLINIC | Facility: CLINIC | Age: 65
End: 2020-11-09
Payer: MEDICARE

## 2020-11-09 VITALS
HEART RATE: 65 BPM | SYSTOLIC BLOOD PRESSURE: 125 MMHG | DIASTOLIC BLOOD PRESSURE: 77 MMHG | HEIGHT: 61.5 IN | WEIGHT: 178 LBS | TEMPERATURE: 97 F | BODY MASS INDEX: 33.18 KG/M2

## 2020-11-09 DIAGNOSIS — E55.9 VITAMIN D DEFICIENCY: Primary | ICD-10-CM

## 2020-11-09 DIAGNOSIS — E07.9 THYROID DISEASE: ICD-10-CM

## 2020-11-09 DIAGNOSIS — L65.9 HAIR LOSS: ICD-10-CM

## 2020-11-09 PROCEDURE — 3074F SYST BP LT 130 MM HG: CPT | Performed by: FAMILY MEDICINE

## 2020-11-09 PROCEDURE — 3008F BODY MASS INDEX DOCD: CPT | Performed by: FAMILY MEDICINE

## 2020-11-09 PROCEDURE — 99213 OFFICE O/P EST LOW 20 MIN: CPT | Performed by: FAMILY MEDICINE

## 2020-11-09 PROCEDURE — 3078F DIAST BP <80 MM HG: CPT | Performed by: FAMILY MEDICINE

## 2020-11-09 RX ORDER — ERGOCALCIFEROL 1.25 MG/1
50000 CAPSULE ORAL WEEKLY
Qty: 12 CAPSULE | Refills: 0 | Status: SHIPPED | OUTPATIENT
Start: 2020-11-09 | End: 2021-01-26

## 2020-11-09 NOTE — PROGRESS NOTES
Patient presents with: Follow - Up: thyroid  Hair/Scalp Problem: c/o hairloss, concerned w/ medication  Weight Problem    HPI:   Chante Crespo is a 72year old female who presents to clinic with thyroid follow -up and c/o diffuse hair loss.     Takes lev aging, thyroid/hormonal changes, medications, anemia and vitamin deficiencies. - DERM - INTERNAL    RTC if no improvement in symptoms. Red flags discussed to go to ER.         Bibiana Cody MD  11/9/2020  9:55 AM

## 2020-12-11 DIAGNOSIS — M19.90 ARTHRITIS: ICD-10-CM

## 2020-12-11 RX ORDER — HYDROCODONE BITARTRATE AND ACETAMINOPHEN 10; 325 MG/1; MG/1
1 TABLET ORAL EVERY 6 HOURS PRN
Qty: 30 TABLET | Refills: 0 | Status: SHIPPED | OUTPATIENT
Start: 2020-12-11 | End: 2021-02-02

## 2020-12-11 RX ORDER — LEVOTHYROXINE SODIUM 0.03 MG/1
25 TABLET ORAL
Qty: 90 TABLET | Refills: 0 | Status: SHIPPED | OUTPATIENT
Start: 2020-12-11 | End: 2021-02-22

## 2020-12-11 NOTE — TELEPHONE ENCOUNTER
Pt. has run out of her Hydrocodone medication, for her arthritis. Pt. Also needs a refill for Levothyroxine.      Current Outpatient Medications   Medication Sig Dispense Refill   •    0   •    0   •    0   •    1   •    1   •    1   •   180 tablet 1   • HY

## 2020-12-18 RX ORDER — LANCETS
EACH MISCELLANEOUS
Qty: 100 EACH | Refills: 0 | Status: SHIPPED | OUTPATIENT
Start: 2020-12-18 | End: 2021-02-22

## 2020-12-18 NOTE — TELEPHONE ENCOUNTER
Current Outpatient Medications:   •  ACCU-CHEK SOFTCLIX LANCETS Does not apply Misc, TEST AS DIRECTED, Disp: 100 each, Rfl: 3

## 2020-12-29 NOTE — TELEPHONE ENCOUNTER
Patient states she contacted her local pharmacy for refill. They advised her they are still waiting on Drs response.  Please advise     LISINOPRIL 20 MG Oral Tab    metFORMIN HCl 1000 MG Oral Tab

## 2020-12-30 RX ORDER — LISINOPRIL 20 MG/1
20 TABLET ORAL DAILY
Qty: 30 TABLET | Refills: 0 | Status: SHIPPED | OUTPATIENT
Start: 2020-12-30 | End: 2021-01-09

## 2021-01-09 RX ORDER — LISINOPRIL 20 MG/1
TABLET ORAL
Qty: 30 TABLET | Refills: 0 | Status: SHIPPED | OUTPATIENT
Start: 2021-01-09 | End: 2021-02-22

## 2021-01-28 DIAGNOSIS — M19.90 ARTHRITIS: ICD-10-CM

## 2021-02-01 DIAGNOSIS — Z23 NEED FOR VACCINATION: ICD-10-CM

## 2021-02-02 RX ORDER — HYDROCODONE BITARTRATE AND ACETAMINOPHEN 10; 325 MG/1; MG/1
1 TABLET ORAL EVERY 6 HOURS PRN
Qty: 30 TABLET | Refills: 0 | Status: SHIPPED | OUTPATIENT
Start: 2021-02-02 | End: 2021-02-22

## 2021-02-11 ENCOUNTER — IMMUNIZATION (OUTPATIENT)
Dept: LAB | Age: 66
End: 2021-02-11
Attending: HOSPITALIST
Payer: MEDICARE

## 2021-02-11 DIAGNOSIS — Z23 NEED FOR VACCINATION: Primary | ICD-10-CM

## 2021-02-11 PROCEDURE — 0001A SARSCOV2 VAC 30MCG/0.3ML IM: CPT

## 2021-02-22 ENCOUNTER — OFFICE VISIT (OUTPATIENT)
Dept: FAMILY MEDICINE CLINIC | Facility: CLINIC | Age: 66
End: 2021-02-22
Payer: MEDICARE

## 2021-02-22 VITALS
WEIGHT: 178 LBS | BODY MASS INDEX: 33.61 KG/M2 | HEIGHT: 61 IN | HEART RATE: 68 BPM | SYSTOLIC BLOOD PRESSURE: 120 MMHG | DIASTOLIC BLOOD PRESSURE: 78 MMHG | TEMPERATURE: 97 F

## 2021-02-22 DIAGNOSIS — Z72.0 TOBACCO USE: ICD-10-CM

## 2021-02-22 DIAGNOSIS — M19.90 ARTHRITIS: ICD-10-CM

## 2021-02-22 DIAGNOSIS — Z00.00 ENCOUNTER FOR ANNUAL HEALTH EXAMINATION: Primary | ICD-10-CM

## 2021-02-22 DIAGNOSIS — E55.9 VITAMIN D DEFICIENCY: ICD-10-CM

## 2021-02-22 DIAGNOSIS — J41.0 SMOKERS' COUGH (HCC): ICD-10-CM

## 2021-02-22 DIAGNOSIS — E11.9 TYPE 2 DIABETES MELLITUS WITHOUT COMPLICATION, WITHOUT LONG-TERM CURRENT USE OF INSULIN (HCC): ICD-10-CM

## 2021-02-22 DIAGNOSIS — Z12.11 SCREENING FOR COLON CANCER: ICD-10-CM

## 2021-02-22 DIAGNOSIS — E78.2 MIXED HYPERLIPIDEMIA: Chronic | ICD-10-CM

## 2021-02-22 DIAGNOSIS — I10 ESSENTIAL HYPERTENSION: Chronic | ICD-10-CM

## 2021-02-22 DIAGNOSIS — Z13.6 SCREENING FOR CARDIOVASCULAR CONDITION: ICD-10-CM

## 2021-02-22 DIAGNOSIS — Z78.0 POSTMENOPAUSAL: ICD-10-CM

## 2021-02-22 DIAGNOSIS — Z13.1 SCREENING FOR DIABETES MELLITUS (DM): ICD-10-CM

## 2021-02-22 DIAGNOSIS — K21.9 GASTROESOPHAGEAL REFLUX DISEASE WITHOUT ESOPHAGITIS: ICD-10-CM

## 2021-02-22 DIAGNOSIS — E07.9 THYROID DISEASE: Chronic | ICD-10-CM

## 2021-02-22 PROBLEM — L65.9 HAIR LOSS: Status: RESOLVED | Noted: 2020-11-09 | Resolved: 2021-02-22

## 2021-02-22 PROCEDURE — 3008F BODY MASS INDEX DOCD: CPT | Performed by: FAMILY MEDICINE

## 2021-02-22 PROCEDURE — 3078F DIAST BP <80 MM HG: CPT | Performed by: FAMILY MEDICINE

## 2021-02-22 PROCEDURE — 3074F SYST BP LT 130 MM HG: CPT | Performed by: FAMILY MEDICINE

## 2021-02-22 PROCEDURE — G0439 PPPS, SUBSEQ VISIT: HCPCS | Performed by: FAMILY MEDICINE

## 2021-02-22 PROCEDURE — 99397 PER PM REEVAL EST PAT 65+ YR: CPT | Performed by: FAMILY MEDICINE

## 2021-02-22 PROCEDURE — 96160 PT-FOCUSED HLTH RISK ASSMT: CPT | Performed by: FAMILY MEDICINE

## 2021-02-22 RX ORDER — LISINOPRIL 20 MG/1
20 TABLET ORAL DAILY
Qty: 90 TABLET | Refills: 0 | Status: SHIPPED | OUTPATIENT
Start: 2021-02-22 | End: 2021-05-06

## 2021-02-22 RX ORDER — LEVOTHYROXINE SODIUM 0.03 MG/1
25 TABLET ORAL
Qty: 90 TABLET | Refills: 0 | Status: SHIPPED | OUTPATIENT
Start: 2021-02-22 | End: 2021-06-22

## 2021-02-22 RX ORDER — GLUCOSAMINE HCL/CHONDROITIN SU 500-400 MG
CAPSULE ORAL
Qty: 100 STRIP | Refills: 11 | Status: SHIPPED | OUTPATIENT
Start: 2021-02-22 | End: 2021-07-22

## 2021-02-22 RX ORDER — SITAGLIPTIN 100 MG/1
100 TABLET, FILM COATED ORAL DAILY
Qty: 90 TABLET | Refills: 1 | Status: SHIPPED | OUTPATIENT
Start: 2021-02-22 | End: 2021-07-18

## 2021-02-22 RX ORDER — RANITIDINE 150 MG/1
150 TABLET ORAL NIGHTLY
Qty: 90 TABLET | Refills: 0 | Status: SHIPPED | OUTPATIENT
Start: 2021-02-22 | End: 2021-03-23

## 2021-02-22 RX ORDER — LANCETS
EACH MISCELLANEOUS
Qty: 100 EACH | Refills: 0 | Status: SHIPPED | OUTPATIENT
Start: 2021-02-22 | End: 2021-03-16

## 2021-02-22 RX ORDER — DAPAGLIFLOZIN 10 MG/1
10 TABLET, FILM COATED ORAL DAILY
Qty: 90 TABLET | Refills: 1 | Status: SHIPPED | OUTPATIENT
Start: 2021-02-22 | End: 2021-07-18

## 2021-02-22 RX ORDER — HYDROCODONE BITARTRATE AND ACETAMINOPHEN 10; 325 MG/1; MG/1
1 TABLET ORAL EVERY 6 HOURS PRN
Qty: 30 TABLET | Refills: 0 | Status: SHIPPED | OUTPATIENT
Start: 2021-03-02 | End: 2021-05-24

## 2021-02-22 NOTE — PROGRESS NOTES
HPI:   Kenji Coto is a 72year old female who presents for a MA (Medicare Advantage) [facility] (Once per calendar year). Patient has a history of osteoarthritis and takes Norco 10 occasionally.   Was advised to stop taking diclofenac by her rheum Vitamin D deficiency    Wt Readings from Last 3 Encounters:  02/22/21 : 178 lb (80.7 kg)  11/09/20 : 178 lb (80.7 kg)  10/19/20 : 178 lb 3.2 oz (80.8 kg)     Last Cholesterol Labs:   Lab Results   Component Value Date    CHOLEST 158 09/16/2020    HDL 40 09 disease, and Type 2 diabetes mellitus without complication, without long-term current use of insulin ([facility] [address] (1/24/2020). She  has a past surgical history that includes knee replacement surgery; cholecystectomy; and colonoscopy (2014).     Her family histo others are saying and make inappropriate responses: No I avoid social activities because I cannot hear well and fear I will reply improperly: No   Family members and friends have told me they think I may have hearing loss:  No               General appearan diabetes mellitus without complication, without long-term current use of insulin (HCC)  -     lisinopril 20 MG Oral Tab; Take 1 tablet (20 mg total) by mouth daily. -     JANUVIA 100 MG Oral Tab;  Take 1 tablet (100 mg total) by mouth daily.  -     Endy Pereira patient  PREVENTATIVE SERVICES  INDICATIONS AND SCHEDULE Internal Lab or Procedure External Lab or Procedure   Diabetes Screening      HbgA1C   Annually Lab Results   Component Value Date    A1C 5.8 (H) 09/16/2020    No flowsheet data found.     Fasting Blo Hepatitis B for Moderate/High Risk No vaccine history found Medium/high risk factors:   End-stage renal disease   Hemophiliacs who received Factor VIII or IX concentrates   Clients of institutions for the mentally retarded   Persons who live in the [facility] products. Assess: We asked about/assessed behavioral health risk and factors affecting choice of behavior change goals/methods. Specifically I asked about readiness to quit tobacco.  Advise:  We gave clear, specific, and personalized behavior change advic

## 2021-02-22 NOTE — PATIENT INSTRUCTIONS
Jesenia Nelson's SCREENING SCHEDULE   Tests on this list are recommended by your physician but may not be covered, or covered at this frequency, by your insurer. Please check with your insurance carrier before scheduling to verify coverage.    PREVENTATI 10 years- more often if abnormal Colonoscopy due on 08/23/2005 Update [facility] if applicable    Flex Sigmoidoscopy Screen  Covered every 5 years No results found for this or any previous visit. No flowsheet data found.      Fecal Occult Blood   Co found for this or any previous visit. Please get once after your 65th birthday    Hepatitis B for Moderate/High Risk       No orders found for this or any previous visit.  Medium/high risk factors:   End-stage renal disease   Hemophiliacs who received Facto

## 2021-03-04 ENCOUNTER — IMMUNIZATION (OUTPATIENT)
Dept: LAB | Age: 66
End: 2021-03-04
Attending: HOSPITALIST
Payer: MEDICARE

## 2021-03-04 DIAGNOSIS — Z23 NEED FOR VACCINATION: Primary | ICD-10-CM

## 2021-03-04 PROCEDURE — 0002A SARSCOV2 VAC 30MCG/0.3ML IM: CPT

## 2021-03-16 DIAGNOSIS — E11.9 TYPE 2 DIABETES MELLITUS WITHOUT COMPLICATION, WITHOUT LONG-TERM CURRENT USE OF INSULIN (HCC): ICD-10-CM

## 2021-03-16 RX ORDER — LANCETS
EACH MISCELLANEOUS
Qty: 100 EACH | Refills: 0 | Status: SHIPPED | OUTPATIENT
Start: 2021-03-16

## 2021-03-23 ENCOUNTER — OFFICE VISIT (OUTPATIENT)
Dept: FAMILY MEDICINE CLINIC | Facility: CLINIC | Age: 66
End: 2021-03-23
Payer: MEDICARE

## 2021-03-23 ENCOUNTER — LAB ENCOUNTER (OUTPATIENT)
Dept: LAB | Age: 66
End: 2021-03-23
Attending: FAMILY MEDICINE
Payer: MEDICARE

## 2021-03-23 ENCOUNTER — EKG ENCOUNTER (OUTPATIENT)
Dept: LAB | Age: 66
End: 2021-03-23
Attending: FAMILY MEDICINE
Payer: MEDICARE

## 2021-03-23 VITALS
TEMPERATURE: 97 F | HEIGHT: 61 IN | DIASTOLIC BLOOD PRESSURE: 66 MMHG | HEART RATE: 60 BPM | SYSTOLIC BLOOD PRESSURE: 100 MMHG | WEIGHT: 179 LBS | BODY MASS INDEX: 33.79 KG/M2

## 2021-03-23 DIAGNOSIS — Z01.818 PREOP EXAMINATION: Primary | ICD-10-CM

## 2021-03-23 DIAGNOSIS — Z00.00 ENCOUNTER FOR ANNUAL HEALTH EXAMINATION: ICD-10-CM

## 2021-03-23 DIAGNOSIS — Z01.818 PREOP EXAMINATION: ICD-10-CM

## 2021-03-23 DIAGNOSIS — Z13.1 SCREENING FOR DIABETES MELLITUS (DM): ICD-10-CM

## 2021-03-23 DIAGNOSIS — E07.9 THYROID DISEASE: ICD-10-CM

## 2021-03-23 DIAGNOSIS — Z13.6 SCREENING FOR CARDIOVASCULAR CONDITION: ICD-10-CM

## 2021-03-23 DIAGNOSIS — K21.9 GASTROESOPHAGEAL REFLUX DISEASE WITHOUT ESOPHAGITIS: ICD-10-CM

## 2021-03-23 LAB
ALBUMIN SERPL-MCNC: 4 G/DL (ref 3.4–5)
ALBUMIN/GLOB SERPL: 1.1 {RATIO} (ref 1–2)
ALP LIVER SERPL-CCNC: 79 U/L
ALT SERPL-CCNC: 40 U/L
ANION GAP SERPL CALC-SCNC: 5 MMOL/L (ref 0–18)
AST SERPL-CCNC: 30 U/L (ref 15–37)
BASOPHILS # BLD AUTO: 0.06 X10(3) UL (ref 0–0.2)
BASOPHILS NFR BLD AUTO: 1 %
BILIRUB SERPL-MCNC: 0.5 MG/DL (ref 0.1–2)
BUN BLD-MCNC: 17 MG/DL (ref 7–18)
BUN/CREAT SERPL: 20.7 (ref 10–20)
CALCIUM BLD-MCNC: 9.5 MG/DL (ref 8.5–10.1)
CHLORIDE SERPL-SCNC: 107 MMOL/L (ref 98–112)
CHOLEST SMN-MCNC: 133 MG/DL (ref ?–200)
CO2 SERPL-SCNC: 26 MMOL/L (ref 21–32)
CREAT BLD-MCNC: 0.82 MG/DL
CREAT UR-SCNC: 95.6 MG/DL
DEPRECATED RDW RBC AUTO: 45.8 FL (ref 35.1–46.3)
EOSINOPHIL # BLD AUTO: 0.11 X10(3) UL (ref 0–0.7)
EOSINOPHIL NFR BLD AUTO: 1.8 %
ERYTHROCYTE [DISTWIDTH] IN BLOOD BY AUTOMATED COUNT: 13.5 % (ref 11–15)
EST. AVERAGE GLUCOSE BLD GHB EST-MCNC: 126 MG/DL (ref 68–126)
GLOBULIN PLAS-MCNC: 3.7 G/DL (ref 2.8–4.4)
GLUCOSE BLD-MCNC: 111 MG/DL (ref 70–99)
HBA1C MFR BLD HPLC: 6 % (ref ?–5.7)
HCT VFR BLD AUTO: 43.5 %
HDLC SERPL-MCNC: 52 MG/DL (ref 40–59)
HGB BLD-MCNC: 13.9 G/DL
IMM GRANULOCYTES # BLD AUTO: 0.03 X10(3) UL (ref 0–1)
IMM GRANULOCYTES NFR BLD: 0.5 %
LDLC SERPL CALC-MCNC: 64 MG/DL (ref ?–100)
LYMPHOCYTES # BLD AUTO: 1.9 X10(3) UL (ref 1–4)
LYMPHOCYTES NFR BLD AUTO: 31.1 %
M PROTEIN MFR SERPL ELPH: 7.7 G/DL (ref 6.4–8.2)
MCH RBC QN AUTO: 29.7 PG (ref 26–34)
MCHC RBC AUTO-ENTMCNC: 32 G/DL (ref 31–37)
MCV RBC AUTO: 92.9 FL
MICROALBUMIN UR-MCNC: 0.88 MG/DL
MICROALBUMIN/CREAT 24H UR-RTO: 9.2 UG/MG (ref ?–30)
MONOCYTES # BLD AUTO: 0.49 X10(3) UL (ref 0.1–1)
MONOCYTES NFR BLD AUTO: 8 %
NEUTROPHILS # BLD AUTO: 3.52 X10 (3) UL (ref 1.5–7.7)
NEUTROPHILS # BLD AUTO: 3.52 X10(3) UL (ref 1.5–7.7)
NEUTROPHILS NFR BLD AUTO: 57.6 %
NONHDLC SERPL-MCNC: 81 MG/DL (ref ?–130)
OSMOLALITY SERPL CALC.SUM OF ELEC: 288 MOSM/KG (ref 275–295)
PATIENT FASTING Y/N/NP: YES
PATIENT FASTING Y/N/NP: YES
PLATELET # BLD AUTO: 253 10(3)UL (ref 150–450)
POTASSIUM SERPL-SCNC: 3.9 MMOL/L (ref 3.5–5.1)
RBC # BLD AUTO: 4.68 X10(6)UL
SODIUM SERPL-SCNC: 138 MMOL/L (ref 136–145)
T4 FREE SERPL-MCNC: 1.1 NG/DL (ref 0.8–1.7)
TRIGL SERPL-MCNC: 86 MG/DL (ref 30–149)
TSI SER-ACNC: 2.88 MIU/ML (ref 0.36–3.74)
VLDLC SERPL CALC-MCNC: 17 MG/DL (ref 0–30)
WBC # BLD AUTO: 6.1 X10(3) UL (ref 4–11)

## 2021-03-23 PROCEDURE — 83036 HEMOGLOBIN GLYCOSYLATED A1C: CPT

## 2021-03-23 PROCEDURE — 36415 COLL VENOUS BLD VENIPUNCTURE: CPT

## 2021-03-23 PROCEDURE — 99214 OFFICE O/P EST MOD 30 MIN: CPT | Performed by: FAMILY MEDICINE

## 2021-03-23 PROCEDURE — 82570 ASSAY OF URINE CREATININE: CPT

## 2021-03-23 PROCEDURE — 93010 ELECTROCARDIOGRAM REPORT: CPT | Performed by: FAMILY MEDICINE

## 2021-03-23 PROCEDURE — 3061F NEG MICROALBUMINURIA REV: CPT | Performed by: FAMILY MEDICINE

## 2021-03-23 PROCEDURE — 3044F HG A1C LEVEL LT 7.0%: CPT | Performed by: FAMILY MEDICINE

## 2021-03-23 PROCEDURE — 80053 COMPREHEN METABOLIC PANEL: CPT

## 2021-03-23 PROCEDURE — 84443 ASSAY THYROID STIM HORMONE: CPT

## 2021-03-23 PROCEDURE — 3008F BODY MASS INDEX DOCD: CPT | Performed by: FAMILY MEDICINE

## 2021-03-23 PROCEDURE — 80061 LIPID PANEL: CPT

## 2021-03-23 PROCEDURE — 3074F SYST BP LT 130 MM HG: CPT | Performed by: FAMILY MEDICINE

## 2021-03-23 PROCEDURE — 93005 ELECTROCARDIOGRAM TRACING: CPT

## 2021-03-23 PROCEDURE — 3078F DIAST BP <80 MM HG: CPT | Performed by: FAMILY MEDICINE

## 2021-03-23 PROCEDURE — 82043 UR ALBUMIN QUANTITATIVE: CPT

## 2021-03-23 PROCEDURE — 84439 ASSAY OF FREE THYROXINE: CPT

## 2021-03-23 PROCEDURE — 85025 COMPLETE CBC W/AUTO DIFF WBC: CPT

## 2021-03-23 RX ORDER — RANITIDINE 150 MG/1
150 TABLET ORAL NIGHTLY
Qty: 90 TABLET | Refills: 0 | Status: SHIPPED | OUTPATIENT
Start: 2021-03-23 | End: 2021-05-24

## 2021-03-23 NOTE — PROGRESS NOTES
Petty Lane is a 72year old female who presents for a pre-operative physical exam.   Petty Lane is scheduled for a R eye cataract procedure at [facility] on 4/1/21 performed by Dr. Ana M Roca. Indication: cataracts.     HPI related to surgery:   Has autumn (two) times daily with meals. 180 tablet 1   • Acetaminophen 500 MG Oral Cap Take two (1000mg) by mouth three times daily. 100 capsule 0   • Fenofibrate 145 MG Oral Tab Take 1 tablet (145 mg total) by mouth nightly.  90 tablet 1   • Pravastatin Sodium 20 MG Hematocrit      35.0 - 48.0 % 43.5   MCV      80.0 - 100.0 fL 92.9   MCH      26.0 - 34.0 pg 29.7   MCHC      31.0 - 37.0 g/dL 32.0   RDW-SD      35.1 - 46.3 fL 45.8   RDW      11.0 - 15.0 % 13.5   Platelet Count      896.5 - 450.0 10(3)uL 253.0   Prelim well as medications and allergies were reviewed with patient.  - CBC WITH DIFFERENTIAL WITH PLATELET; Future  - COMP METABOLIC PANEL (14); Future  - EKG 12-LEAD; Future: sinus sundar - pt's HR is in 60s at baseline    RTC if no improvement in symptoms.  Red

## 2021-03-25 ENCOUNTER — TELEPHONE (OUTPATIENT)
Dept: FAMILY MEDICINE CLINIC | Facility: CLINIC | Age: 66
End: 2021-03-25

## 2021-03-25 NOTE — TELEPHONE ENCOUNTER
[facility] requesting a medical clearance and labs for the patient and states surgery 4/1 and can be faxed to [phone]

## 2021-03-30 NOTE — TELEPHONE ENCOUNTER
Refaxed, confirmation received. Confirmed w/ Maria Elena that clearance was received and patient may proceed with surgery.

## 2021-04-27 ENCOUNTER — TELEPHONE (OUTPATIENT)
Dept: CASE MANAGEMENT | Age: 66
End: 2021-04-27

## 2021-04-27 NOTE — TELEPHONE ENCOUNTER
Patient is eligible for a 2021 Medicare Annual Wellness visit. Voicemail box not set up. Unable to leave message.

## 2021-05-06 DIAGNOSIS — E11.9 TYPE 2 DIABETES MELLITUS WITHOUT COMPLICATION, WITHOUT LONG-TERM CURRENT USE OF INSULIN (HCC): ICD-10-CM

## 2021-05-06 RX ORDER — LISINOPRIL 20 MG/1
TABLET ORAL
Qty: 30 TABLET | Refills: 0 | Status: SHIPPED | OUTPATIENT
Start: 2021-05-06 | End: 2021-06-03

## 2021-05-24 ENCOUNTER — OFFICE VISIT (OUTPATIENT)
Dept: FAMILY MEDICINE CLINIC | Facility: CLINIC | Age: 66
End: 2021-05-24
Payer: MEDICARE

## 2021-05-24 ENCOUNTER — TELEPHONE (OUTPATIENT)
Dept: FAMILY MEDICINE CLINIC | Facility: CLINIC | Age: 66
End: 2021-05-24

## 2021-05-24 VITALS
DIASTOLIC BLOOD PRESSURE: 84 MMHG | WEIGHT: 180 LBS | HEART RATE: 64 BPM | BODY MASS INDEX: 33.99 KG/M2 | HEIGHT: 61 IN | SYSTOLIC BLOOD PRESSURE: 133 MMHG | TEMPERATURE: 98 F

## 2021-05-24 DIAGNOSIS — K21.9 GASTROESOPHAGEAL REFLUX DISEASE WITHOUT ESOPHAGITIS: ICD-10-CM

## 2021-05-24 DIAGNOSIS — R25.2 MUSCLE CRAMP: ICD-10-CM

## 2021-05-24 DIAGNOSIS — R07.9 LEFT-SIDED CHEST PAIN: ICD-10-CM

## 2021-05-24 DIAGNOSIS — M19.90 ARTHRITIS: ICD-10-CM

## 2021-05-24 DIAGNOSIS — I83.813 VARICOSE VEINS OF BOTH LOWER EXTREMITIES WITH PAIN: Primary | ICD-10-CM

## 2021-05-24 PROCEDURE — 3079F DIAST BP 80-89 MM HG: CPT | Performed by: FAMILY MEDICINE

## 2021-05-24 PROCEDURE — 99214 OFFICE O/P EST MOD 30 MIN: CPT | Performed by: FAMILY MEDICINE

## 2021-05-24 PROCEDURE — 3008F BODY MASS INDEX DOCD: CPT | Performed by: FAMILY MEDICINE

## 2021-05-24 PROCEDURE — 3075F SYST BP GE 130 - 139MM HG: CPT | Performed by: FAMILY MEDICINE

## 2021-05-24 RX ORDER — TIZANIDINE HYDROCHLORIDE 2 MG/1
2 CAPSULE, GELATIN COATED ORAL 2 TIMES DAILY PRN
Qty: 60 CAPSULE | Refills: 1 | Status: SHIPPED | OUTPATIENT
Start: 2021-05-24 | End: 2021-06-06

## 2021-05-24 RX ORDER — RANITIDINE 150 MG/1
150 TABLET ORAL NIGHTLY
Qty: 90 TABLET | Refills: 0 | Status: SHIPPED | OUTPATIENT
Start: 2021-05-24

## 2021-05-24 RX ORDER — HYDROCODONE BITARTRATE AND ACETAMINOPHEN 10; 325 MG/1; MG/1
1 TABLET ORAL EVERY 6 HOURS PRN
Qty: 90 TABLET | Refills: 0 | Status: SHIPPED | OUTPATIENT
Start: 2021-05-24 | End: 2021-07-30

## 2021-05-24 NOTE — TELEPHONE ENCOUNTER
Referral placed. Okay to inform patient. Also please let her know that I sent a muscle relaxant we discussed to the pharmacy. Forgot to send immediately after visit.   Thanks

## 2021-05-24 NOTE — PROGRESS NOTES
Patient presents with:  Medication Follow-Up  Varicose Veins  Shoulder Pain: c/o left shoulder pain    HPI:   Naun Maddox is a 72year old female who presents to clinic for medication refill, concerns over left sided chest pain and inflamed/painful jodie throughout, no pitting edema or overlying skin change, no stasis dermatitis changes. ASSESSMENT AND PLAN:     1. Gastroesophageal reflux disease without esophagitis  - raNITIdine HCl 150 MG Oral Tab; Take 1 tablet (150 mg total) by mouth nightly.   Dispe

## 2021-05-24 NOTE — TELEPHONE ENCOUNTER
Patient requesting her referral to Vascular surgery to be changed to see Dr. Cliff Aponte from [facility]. Please advise.

## 2021-06-03 ENCOUNTER — TELEPHONE (OUTPATIENT)
Dept: FAMILY MEDICINE CLINIC | Facility: CLINIC | Age: 66
End: 2021-06-03

## 2021-06-03 DIAGNOSIS — R25.2 MUSCLE CRAMP: Primary | ICD-10-CM

## 2021-06-03 DIAGNOSIS — E11.9 TYPE 2 DIABETES MELLITUS WITHOUT COMPLICATION, WITHOUT LONG-TERM CURRENT USE OF INSULIN (HCC): ICD-10-CM

## 2021-06-03 NOTE — TELEPHONE ENCOUNTER
Current Outpatient Medications:   •  tiZANidine HCl 2 MG Oral Cap, Take 1 capsule (2 mg total) by mouth 2 (two) times daily as needed for Muscle spasms (RELAJANTE MUSCULAR). , Disp: 60 capsule, Rfl: 1    Pt states that per pharmacy ([phone]

## 2021-06-03 NOTE — TELEPHONE ENCOUNTER
Pt states that she used to take the following medication prescribed from her previous pcp    BACLOFEN 10MG TABLETS     She is wondering if she can be prescribed that, per pt, it helps her relax her muscles and helps her sleep.  Shes been having trouble slee [Procedure: _________] : a [unfilled] procedure visit

## 2021-06-04 RX ORDER — LISINOPRIL 20 MG/1
20 TABLET ORAL DAILY
Qty: 90 TABLET | Refills: 1 | Status: SHIPPED | OUTPATIENT
Start: 2021-06-04 | End: 2021-07-22

## 2021-06-04 NOTE — TELEPHONE ENCOUNTER
There is another encounter from 6/3/21 in regards to tizanidine. Should patient be on both tizanidine and baclofen ?

## 2021-06-04 NOTE — TELEPHONE ENCOUNTER
Patient should only be taking 1 muscle relaxant either the tizanidine or the baclofen. If she would like to start taking the baclofen I can prescribe that but then she should discontinue the tizanidine.

## 2021-06-04 NOTE — TELEPHONE ENCOUNTER
No answer at tel#[phone]. No voice mailbox to leave a message. Slovenian speaking patient. Clinical staff to try reaching pt later.      Please reply to pool: SAVANNA Dia

## 2021-06-05 NOTE — TELEPHONE ENCOUNTER
Attempted to reach patient, no answer and no vm. Unable to reach patient after several attempts. No new script for Baclofen was sent to the pharmacy, unable to confirm what med patient prefers to stay on.  Will await call back from patient regarding the med

## 2021-06-05 NOTE — TELEPHONE ENCOUNTER
Pt states that per pharmacy ([facility] [address] [facility], [phone], [phone])  Tizanidine  medication is not covered by insurance. It is too expensive to pay out of pocket.  Pt is

## 2021-06-06 RX ORDER — BACLOFEN 10 MG/1
10 TABLET ORAL DAILY PRN
Qty: 60 TABLET | Refills: 1 | Status: SHIPPED | OUTPATIENT
Start: 2021-06-06 | End: 2021-07-22

## 2021-06-06 NOTE — TELEPHONE ENCOUNTER
Baclofen sent to pharmacy. Muscle cramp  - baclofen 10 MG Oral Tab; Take 1 tablet (10 mg total) by mouth daily as needed (muscle stiffness). Dispense: 60 tablet;  Refill: 1

## 2021-06-07 NOTE — TELEPHONE ENCOUNTER
Spoke to patient using Hebrew interpretor [id number] to let her know and alternative medication had been ordered for her. Patient verbalized understanding and states she only takes one as needed and not even every day.  Verified with Shani it was ready fo

## 2021-06-08 ENCOUNTER — TELEPHONE (OUTPATIENT)
Dept: FAMILY MEDICINE CLINIC | Facility: CLINIC | Age: 66
End: 2021-06-08

## 2021-06-16 ENCOUNTER — OFFICE VISIT (OUTPATIENT)
Dept: CARDIOLOGY CLINIC | Facility: CLINIC | Age: 66
End: 2021-06-16
Payer: MEDICARE

## 2021-06-16 VITALS
BODY MASS INDEX: 33.99 KG/M2 | DIASTOLIC BLOOD PRESSURE: 83 MMHG | WEIGHT: 180 LBS | SYSTOLIC BLOOD PRESSURE: 126 MMHG | HEIGHT: 61 IN | HEART RATE: 62 BPM

## 2021-06-16 DIAGNOSIS — R07.9 CHEST PAIN, UNSPECIFIED TYPE: Primary | ICD-10-CM

## 2021-06-16 PROCEDURE — 3079F DIAST BP 80-89 MM HG: CPT | Performed by: NURSE PRACTITIONER

## 2021-06-16 PROCEDURE — 93000 ELECTROCARDIOGRAM COMPLETE: CPT | Performed by: NURSE PRACTITIONER

## 2021-06-16 PROCEDURE — 3074F SYST BP LT 130 MM HG: CPT | Performed by: NURSE PRACTITIONER

## 2021-06-16 PROCEDURE — 99203 OFFICE O/P NEW LOW 30 MIN: CPT | Performed by: NURSE PRACTITIONER

## 2021-06-16 PROCEDURE — 3008F BODY MASS INDEX DOCD: CPT | Performed by: NURSE PRACTITIONER

## 2021-06-16 NOTE — PROGRESS NOTES
Matt Finch is a 72year old female. Patient presents with:  Consult: EKG  03/23/21  Chest Pain: back pain    HPI:   Patient comes in today for consultation.  She sees Dr. Chel Day she has a history of hypertension diabetes high cholesterol she is here toda non-insulin 100 strip 11   • Acetaminophen 500 MG Oral Cap Take two (1000mg) by mouth three times daily. 100 capsule 0   • Fenofibrate 145 MG Oral Tab Take 1 tablet (145 mg total) by mouth nightly.  90 tablet 1   • Pravastatin Sodium 20 MG Oral Tab Take 20 HSM or tenderness  EXTREMITIES: no cyanosis, clubbing or edema    ASSESSMENT AND PLAN:     Problem List Items Addressed This Visit     None      Visit Diagnoses     Chest pain, unspecified type    -  Primary    Relevant Orders    ELECTROCARDIOGRAM, COMPLET

## 2021-06-22 ENCOUNTER — TELEPHONE (OUTPATIENT)
Dept: CARDIOLOGY CLINIC | Facility: CLINIC | Age: 66
End: 2021-06-22

## 2021-06-22 DIAGNOSIS — E07.9 THYROID DISEASE: Chronic | ICD-10-CM

## 2021-06-22 RX ORDER — LEVOTHYROXINE SODIUM 0.03 MG/1
25 TABLET ORAL
Qty: 90 TABLET | Refills: 0 | Status: SHIPPED | OUTPATIENT
Start: 2021-06-22 | End: 2021-10-11

## 2021-06-22 NOTE — TELEPHONE ENCOUNTER
Puerto Rican speaking - pt would like a refill on Rx levothroxine 25 MCG.  Please advise       Current Outpatient Medications   Medication Sig Dispense Refill       1       1       0       0       0       1       1   • Levothyroxine Sodium 25 MCG Oral Tab Take 1

## 2021-06-22 NOTE — TELEPHONE ENCOUNTER
Returned call with language line   Wilmanrenée Palomares #[id number]    Instructed she was to schedule stress test and based on the results we will let her know if she needs to follow up with cardiology or with PCP. Expressed understanding.  Transferred call to s

## 2021-06-22 NOTE — TELEPHONE ENCOUNTER
Pt. is not sure when to return for a f/up and she is not sure is she should sched appt for [place] office? Pt. States that she is confused about ltr., that was sent to her. Pt. Only speaks Korean.

## 2021-07-15 DIAGNOSIS — E11.9 TYPE 2 DIABETES MELLITUS WITHOUT COMPLICATION, WITHOUT LONG-TERM CURRENT USE OF INSULIN (HCC): ICD-10-CM

## 2021-07-18 RX ORDER — DAPAGLIFLOZIN 10 MG/1
TABLET, FILM COATED ORAL
Qty: 90 TABLET | Refills: 1 | Status: SHIPPED | OUTPATIENT
Start: 2021-07-18 | End: 2021-07-22

## 2021-07-18 RX ORDER — SITAGLIPTIN 100 MG/1
TABLET, FILM COATED ORAL
Qty: 90 TABLET | Refills: 1 | Status: SHIPPED | OUTPATIENT
Start: 2021-07-18

## 2021-07-22 ENCOUNTER — LAB ENCOUNTER (OUTPATIENT)
Dept: LAB | Age: 66
End: 2021-07-22
Attending: FAMILY MEDICINE
Payer: MEDICARE

## 2021-07-22 ENCOUNTER — OFFICE VISIT (OUTPATIENT)
Dept: FAMILY MEDICINE CLINIC | Facility: CLINIC | Age: 66
End: 2021-07-22
Payer: MEDICARE

## 2021-07-22 VITALS
DIASTOLIC BLOOD PRESSURE: 72 MMHG | BODY MASS INDEX: 33.61 KG/M2 | HEIGHT: 61 IN | WEIGHT: 178 LBS | TEMPERATURE: 98 F | SYSTOLIC BLOOD PRESSURE: 113 MMHG | HEART RATE: 60 BPM

## 2021-07-22 DIAGNOSIS — R25.2 MUSCLE CRAMP: ICD-10-CM

## 2021-07-22 DIAGNOSIS — E11.9 TYPE 2 DIABETES MELLITUS WITHOUT COMPLICATION, WITHOUT LONG-TERM CURRENT USE OF INSULIN (HCC): ICD-10-CM

## 2021-07-22 DIAGNOSIS — Z12.31 ENCOUNTER FOR SCREENING MAMMOGRAM FOR MALIGNANT NEOPLASM OF BREAST: Primary | ICD-10-CM

## 2021-07-22 DIAGNOSIS — M54.50 ACUTE BILATERAL LOW BACK PAIN, UNSPECIFIED WHETHER SCIATICA PRESENT: ICD-10-CM

## 2021-07-22 DIAGNOSIS — R82.90 FOUL SMELLING URINE: ICD-10-CM

## 2021-07-22 DIAGNOSIS — I10 ESSENTIAL HYPERTENSION: ICD-10-CM

## 2021-07-22 DIAGNOSIS — E78.2 MIXED HYPERLIPIDEMIA: ICD-10-CM

## 2021-07-22 DIAGNOSIS — Z23 NEED FOR SHINGLES VACCINE: ICD-10-CM

## 2021-07-22 DIAGNOSIS — N76.0 ACUTE VAGINITIS: ICD-10-CM

## 2021-07-22 LAB
ALBUMIN SERPL-MCNC: 4.1 G/DL (ref 3.4–5)
ALBUMIN/GLOB SERPL: 1 {RATIO} (ref 1–2)
ALP LIVER SERPL-CCNC: 81 U/L
ALT SERPL-CCNC: 33 U/L
ANION GAP SERPL CALC-SCNC: 6 MMOL/L (ref 0–18)
AST SERPL-CCNC: 24 U/L (ref 15–37)
BILIRUB SERPL-MCNC: 0.4 MG/DL (ref 0.1–2)
BILIRUBIN: NEGATIVE
BUN BLD-MCNC: 22 MG/DL (ref 7–18)
BUN/CREAT SERPL: 28.6 (ref 10–20)
CALCIUM BLD-MCNC: 9.6 MG/DL (ref 8.5–10.1)
CHLORIDE SERPL-SCNC: 109 MMOL/L (ref 98–112)
CHOLEST SMN-MCNC: 136 MG/DL (ref ?–200)
CO2 SERPL-SCNC: 26 MMOL/L (ref 21–32)
CREAT BLD-MCNC: 0.77 MG/DL
CREAT UR-SCNC: 81.4 MG/DL
EST. AVERAGE GLUCOSE BLD GHB EST-MCNC: 120 MG/DL (ref 68–126)
GLOBULIN PLAS-MCNC: 4 G/DL (ref 2.8–4.4)
GLUCOSE (URINE DIPSTICK): 500 MG/DL
GLUCOSE BLD-MCNC: 77 MG/DL (ref 70–99)
HBA1C MFR BLD HPLC: 5.8 % (ref ?–5.7)
HDLC SERPL-MCNC: 50 MG/DL (ref 40–59)
KETONES (URINE DIPSTICK): NEGATIVE MG/DL
LDLC SERPL CALC-MCNC: 67 MG/DL (ref ?–100)
LEUKOCYTES: NEGATIVE
M PROTEIN MFR SERPL ELPH: 8.1 G/DL (ref 6.4–8.2)
MICROALBUMIN UR-MCNC: 1.42 MG/DL
MICROALBUMIN/CREAT 24H UR-RTO: 17.4 UG/MG (ref ?–30)
MULTISTIX LOT#: ABNORMAL NUMERIC
NITRITE, URINE: NEGATIVE
NONHDLC SERPL-MCNC: 86 MG/DL (ref ?–130)
OSMOLALITY SERPL CALC.SUM OF ELEC: 294 MOSM/KG (ref 275–295)
PATIENT FASTING Y/N/NP: NO
PATIENT FASTING Y/N/NP: NO
PH, URINE: 5.5 (ref 4.5–8)
POTASSIUM SERPL-SCNC: 3.8 MMOL/L (ref 3.5–5.1)
PROTEIN (URINE DIPSTICK): NEGATIVE MG/DL
SODIUM SERPL-SCNC: 141 MMOL/L (ref 136–145)
SPECIFIC GRAVITY: 1.02 (ref 1–1.03)
TRIGL SERPL-MCNC: 106 MG/DL (ref 30–149)
URINE-COLOR: YELLOW
UROBILINOGEN,SEMI-QN: 0.2 MG/DL (ref 0–1.9)
VLDLC SERPL CALC-MCNC: 16 MG/DL (ref 0–30)

## 2021-07-22 PROCEDURE — 82570 ASSAY OF URINE CREATININE: CPT

## 2021-07-22 PROCEDURE — 83036 HEMOGLOBIN GLYCOSYLATED A1C: CPT

## 2021-07-22 PROCEDURE — 99214 OFFICE O/P EST MOD 30 MIN: CPT | Performed by: FAMILY MEDICINE

## 2021-07-22 PROCEDURE — 3078F DIAST BP <80 MM HG: CPT | Performed by: FAMILY MEDICINE

## 2021-07-22 PROCEDURE — 80061 LIPID PANEL: CPT

## 2021-07-22 PROCEDURE — 82043 UR ALBUMIN QUANTITATIVE: CPT

## 2021-07-22 PROCEDURE — 3044F HG A1C LEVEL LT 7.0%: CPT | Performed by: FAMILY MEDICINE

## 2021-07-22 PROCEDURE — 80053 COMPREHEN METABOLIC PANEL: CPT

## 2021-07-22 PROCEDURE — 3061F NEG MICROALBUMINURIA REV: CPT | Performed by: FAMILY MEDICINE

## 2021-07-22 PROCEDURE — 3074F SYST BP LT 130 MM HG: CPT | Performed by: FAMILY MEDICINE

## 2021-07-22 PROCEDURE — 36415 COLL VENOUS BLD VENIPUNCTURE: CPT

## 2021-07-22 PROCEDURE — 81003 URINALYSIS AUTO W/O SCOPE: CPT | Performed by: FAMILY MEDICINE

## 2021-07-22 PROCEDURE — 3008F BODY MASS INDEX DOCD: CPT | Performed by: FAMILY MEDICINE

## 2021-07-22 RX ORDER — PRAVASTATIN SODIUM 10 MG
10 TABLET ORAL NIGHTLY
Qty: 90 TABLET | Refills: 1 | Status: SHIPPED | OUTPATIENT
Start: 2021-07-22 | End: 2022-01-17

## 2021-07-22 RX ORDER — GLUCOSAMINE HCL/CHONDROITIN SU 500-400 MG
CAPSULE ORAL
Qty: 100 STRIP | Refills: 11 | Status: SHIPPED | OUTPATIENT
Start: 2021-07-22

## 2021-07-22 RX ORDER — BACLOFEN 10 MG/1
10 TABLET ORAL DAILY PRN
Qty: 60 TABLET | Refills: 1 | Status: SHIPPED | OUTPATIENT
Start: 2021-07-22 | End: 2021-09-14

## 2021-07-22 RX ORDER — FAMOTIDINE 20 MG/1
20 TABLET ORAL NIGHTLY
COMMUNITY
Start: 2021-05-26

## 2021-07-22 RX ORDER — ZOSTER VACCINE RECOMBINANT, ADJUVANTED 50 MCG/0.5
0.5 KIT INTRAMUSCULAR ONCE
Qty: 1 EACH | Refills: 0 | Status: SHIPPED | OUTPATIENT
Start: 2021-07-22 | End: 2021-07-22

## 2021-07-22 RX ORDER — LISINOPRIL 20 MG/1
20 TABLET ORAL DAILY
Qty: 90 TABLET | Refills: 1 | Status: SHIPPED | OUTPATIENT
Start: 2021-07-22 | End: 2021-10-11

## 2021-07-22 RX ORDER — FLUCONAZOLE 200 MG/1
200 TABLET ORAL
Qty: 2 TABLET | Refills: 0 | Status: SHIPPED | OUTPATIENT
Start: 2021-07-22 | End: 2021-10-11

## 2021-07-22 NOTE — PROGRESS NOTES
Patient presents with:  Urinary Symptoms: c/o itching and odor      HPI:   Sami Garvey is a 72year old female who presents for foul-smelling urine and vaginal itching.   This has been going on for weeks but patient was too embarrassed to come into the hours as needed for Pain. 90 tablet 0   • Fenofibrate 145 MG Oral Tab Take 1 tablet (145 mg total) by mouth nightly. 90 tablet 1   • Pravastatin Sodium 20 MG Oral Tab Take 20 mg by mouth nightly.        • Multiple Vitamin (MULTI-VITAMIN DAILY OR) 1 TABLET D HPI  EXAM:   /72 (BP Location: Right arm, Patient Position: Sitting, Cuff Size: adult)   Pulse 60   Temp 97.6 °F (36.4 °C) (Tympanic)   Ht 5' 1\" (1.549 m)   Wt 178 lb (80.7 kg)   BMI 33.63 kg/m²     GENERAL: well developed, well nourished, in no rachel vaccine  - Zoster Vac Recomb Adjuvanted (SHINGRIX) 50 MCG/0.5ML Intramuscular Recon Susp; Inject 0.5 mL into the muscle one time for 1 dose. Dispense: 1 each; Refill: 0    9.  Acute bilateral low back pain, unspecified whether sciatica present  -Okay to ta

## 2021-07-30 ENCOUNTER — TELEPHONE (OUTPATIENT)
Dept: FAMILY MEDICINE CLINIC | Facility: CLINIC | Age: 66
End: 2021-07-30

## 2021-07-30 DIAGNOSIS — N30.00 ACUTE CYSTITIS WITHOUT HEMATURIA: Primary | ICD-10-CM

## 2021-07-30 DIAGNOSIS — M19.90 ARTHRITIS: ICD-10-CM

## 2021-07-30 RX ORDER — HYDROCODONE BITARTRATE AND ACETAMINOPHEN 10; 325 MG/1; MG/1
1 TABLET ORAL EVERY 6 HOURS PRN
Qty: 90 TABLET | Refills: 0 | Status: SHIPPED | OUTPATIENT
Start: 2021-07-30

## 2021-07-30 RX ORDER — NITROFURANTOIN 25; 75 MG/1; MG/1
100 CAPSULE ORAL 2 TIMES DAILY
Qty: 10 CAPSULE | Refills: 0 | Status: SHIPPED | OUTPATIENT
Start: 2021-07-30 | End: 2021-09-06

## 2021-08-05 ENCOUNTER — TELEPHONE (OUTPATIENT)
Dept: FAMILY MEDICINE CLINIC | Facility: CLINIC | Age: 66
End: 2021-08-05

## 2021-08-05 DIAGNOSIS — I83.813 VARICOSE VEINS OF BOTH LOWER EXTREMITIES WITH PAIN: Primary | ICD-10-CM

## 2021-08-05 NOTE — TELEPHONE ENCOUNTER
Patient is requesting a referral for Vascular surgery. She states that Dr. Matteo Nunez is too far from her and she struggles to find transportation. She is requesting to see someone closer to her home. Please advise.

## 2021-08-05 NOTE — TELEPHONE ENCOUNTER
Two separate referrals placed to Dr. Wadsworth [facility]) and Dr. Ilene Downing ([facility]).   Both have office hours in [facility].

## 2021-09-03 ENCOUNTER — TELEPHONE (OUTPATIENT)
Dept: FAMILY MEDICINE CLINIC | Facility: CLINIC | Age: 66
End: 2021-09-03

## 2021-09-03 DIAGNOSIS — N30.00 ACUTE CYSTITIS WITHOUT HEMATURIA: ICD-10-CM

## 2021-09-03 NOTE — TELEPHONE ENCOUNTER
Dr. Sunil Arroyo, [facility]  Karlee Hermes #[id number]    Patient states she was requesting antibiotic refill because patient feels she has another UTI. Patient reports burning sensation that started a week ago.  Was already prescribed medication but ran ou

## 2021-09-06 RX ORDER — NITROFURANTOIN 25; 75 MG/1; MG/1
CAPSULE ORAL
Qty: 10 CAPSULE | Refills: 0 | Status: SHIPPED | OUTPATIENT
Start: 2021-09-06 | End: 2021-10-11 | Stop reason: ALTCHOICE

## 2021-09-06 NOTE — TELEPHONE ENCOUNTER
Please follow-up with patient. If she did not go to urgent care - I have placed urine culture to be completed. Sent Macrobid to pharmacy- can use if not getting other antibiotic treatment currently.

## 2021-09-09 NOTE — TELEPHONE ENCOUNTER
With  Libia Ground ID# [id number]. Advised patient of Dr. Babak Richards note. Patient verbalized understanding.

## 2021-09-10 ENCOUNTER — LAB ENCOUNTER (OUTPATIENT)
Dept: LAB | Age: 66
End: 2021-09-10
Attending: FAMILY MEDICINE
Payer: MEDICARE

## 2021-09-10 DIAGNOSIS — E55.9 VITAMIN D DEFICIENCY: ICD-10-CM

## 2021-09-10 DIAGNOSIS — E07.9 THYROID DISEASE: ICD-10-CM

## 2021-09-10 DIAGNOSIS — N30.00 ACUTE CYSTITIS WITHOUT HEMATURIA: ICD-10-CM

## 2021-09-10 LAB
T4 FREE SERPL-MCNC: 1.1 NG/DL (ref 0.8–1.7)
TSI SER-ACNC: 2.33 MIU/ML (ref 0.36–3.74)
VIT D+METAB SERPL-MCNC: 26.5 NG/ML (ref 30–100)

## 2021-09-10 PROCEDURE — 87186 SC STD MICRODIL/AGAR DIL: CPT

## 2021-09-10 PROCEDURE — 87088 URINE BACTERIA CULTURE: CPT

## 2021-09-10 PROCEDURE — 84439 ASSAY OF FREE THYROXINE: CPT

## 2021-09-10 PROCEDURE — 87086 URINE CULTURE/COLONY COUNT: CPT

## 2021-09-10 PROCEDURE — 36415 COLL VENOUS BLD VENIPUNCTURE: CPT

## 2021-09-10 PROCEDURE — 82306 VITAMIN D 25 HYDROXY: CPT

## 2021-09-10 PROCEDURE — 84443 ASSAY THYROID STIM HORMONE: CPT

## 2021-09-12 ENCOUNTER — TELEPHONE (OUTPATIENT)
Dept: FAMILY MEDICINE CLINIC | Facility: CLINIC | Age: 66
End: 2021-09-12

## 2021-09-12 RX ORDER — SULFAMETHOXAZOLE AND TRIMETHOPRIM 800; 160 MG/1; MG/1
1 TABLET ORAL 2 TIMES DAILY
Qty: 6 TABLET | Refills: 0 | Status: SHIPPED | OUTPATIENT
Start: 2021-09-12 | End: 2021-09-15

## 2021-09-14 ENCOUNTER — TELEPHONE (OUTPATIENT)
Dept: FAMILY MEDICINE CLINIC | Facility: CLINIC | Age: 66
End: 2021-09-14

## 2021-09-14 DIAGNOSIS — R25.2 MUSCLE CRAMP: ICD-10-CM

## 2021-09-14 RX ORDER — BACLOFEN 10 MG/1
10 TABLET ORAL DAILY PRN
Qty: 60 TABLET | Refills: 1 | Status: SHIPPED | OUTPATIENT
Start: 2021-09-14 | End: 2022-01-08

## 2021-09-14 NOTE — TELEPHONE ENCOUNTER
Rickey Bailey pt will like a refill for her Baclofen 10 mg pt stated that this medication helps relax her muscles.  Please advise

## 2021-09-14 NOTE — TELEPHONE ENCOUNTER
1. Muscle cramp  - baclofen 10 MG Oral Tab; Take 1 tablet (10 mg total) by mouth daily as needed (muscle stiffness). Dispense: 60 tablet;  Refill: 1

## 2021-09-22 RX ORDER — FENOFIBRATE 145 MG/1
145 TABLET, COATED ORAL NIGHTLY
Qty: 90 TABLET | Refills: 1 | Status: SHIPPED | OUTPATIENT
Start: 2021-09-22 | End: 2021-11-23

## 2021-09-22 NOTE — TELEPHONE ENCOUNTER
Refill passed per [facility] protocol.     Requested Prescriptions   Pending Prescriptions Disp Refills    FENOFIBRATE 145 MG Oral Tab [Pharmacy Med Name: FENOFIBRATE 145MG TABLETS] 90 tablet 1     Sig: TAKE 1 TABLET(145 MG) BY MOUTH EVERY NIGHT

## 2021-10-09 ENCOUNTER — LAB ENCOUNTER (OUTPATIENT)
Dept: LAB | Age: 66
End: 2021-10-09
Attending: DERMATOLOGY
Payer: MEDICARE

## 2021-10-09 DIAGNOSIS — L65.9 NON-SCARRING HAIR LOSS: Primary | ICD-10-CM

## 2021-10-09 PROCEDURE — 82728 ASSAY OF FERRITIN: CPT

## 2021-10-09 PROCEDURE — 36415 COLL VENOUS BLD VENIPUNCTURE: CPT

## 2021-10-11 ENCOUNTER — OFFICE VISIT (OUTPATIENT)
Dept: FAMILY MEDICINE CLINIC | Facility: CLINIC | Age: 66
End: 2021-10-11
Payer: COMMERCIAL

## 2021-10-11 VITALS
DIASTOLIC BLOOD PRESSURE: 74 MMHG | WEIGHT: 178 LBS | SYSTOLIC BLOOD PRESSURE: 110 MMHG | BODY MASS INDEX: 33.61 KG/M2 | HEIGHT: 61 IN | TEMPERATURE: 98 F | HEART RATE: 61 BPM

## 2021-10-11 DIAGNOSIS — E07.9 THYROID DISEASE: Chronic | ICD-10-CM

## 2021-10-11 DIAGNOSIS — E11.9 TYPE 2 DIABETES MELLITUS WITHOUT COMPLICATION, WITHOUT LONG-TERM CURRENT USE OF INSULIN (HCC): Primary | ICD-10-CM

## 2021-10-11 DIAGNOSIS — G47.9 SLEEP DISTURBANCE: ICD-10-CM

## 2021-10-11 DIAGNOSIS — E55.9 VITAMIN D DEFICIENCY: ICD-10-CM

## 2021-10-11 PROCEDURE — 3008F BODY MASS INDEX DOCD: CPT | Performed by: FAMILY MEDICINE

## 2021-10-11 PROCEDURE — 83036 HEMOGLOBIN GLYCOSYLATED A1C: CPT | Performed by: FAMILY MEDICINE

## 2021-10-11 PROCEDURE — 3074F SYST BP LT 130 MM HG: CPT | Performed by: FAMILY MEDICINE

## 2021-10-11 PROCEDURE — 99214 OFFICE O/P EST MOD 30 MIN: CPT | Performed by: FAMILY MEDICINE

## 2021-10-11 PROCEDURE — 3078F DIAST BP <80 MM HG: CPT | Performed by: FAMILY MEDICINE

## 2021-10-11 PROCEDURE — 3044F HG A1C LEVEL LT 7.0%: CPT | Performed by: FAMILY MEDICINE

## 2021-10-11 RX ORDER — LEVOTHYROXINE SODIUM 0.03 MG/1
25 TABLET ORAL
Qty: 90 TABLET | Refills: 1 | Status: SHIPPED | OUTPATIENT
Start: 2021-10-11 | End: 2021-11-23

## 2021-10-11 RX ORDER — LANCETS 28 GAUGE
EACH MISCELLANEOUS
Qty: 100 EACH | Refills: 1 | Status: SHIPPED | OUTPATIENT
Start: 2021-10-11

## 2021-10-11 RX ORDER — LISINOPRIL 20 MG/1
20 TABLET ORAL DAILY
Qty: 90 TABLET | Refills: 1 | Status: SHIPPED | OUTPATIENT
Start: 2021-10-11 | End: 2021-11-23

## 2021-10-11 RX ORDER — LANCETS 28 GAUGE
EACH MISCELLANEOUS
Qty: 100 EACH | Refills: 1 | Status: SHIPPED | OUTPATIENT
Start: 2021-10-11 | End: 2021-10-11

## 2021-10-11 NOTE — PROGRESS NOTES
Patient presents with: Follow - Up: requesting new glucose monitor   Medication Request: medicaton for sleep  Lab Results    HPI:   Tuan Gonzalez is a 77year old female who presents to clinic for follow-up.   Patient has a history of type 2 diabetes, sh over-the-counter vitamin D3 1000 units    3. Thyroid disease  -Recent TSH within normal limits  - levothyroxine 25 MCG Oral Tab; Take 1 tablet (25 mcg total) by mouth before breakfast.  Dispense: 90 tablet; Refill: 1    4.  Sleep disturbance  -Restarting se

## 2021-11-23 ENCOUNTER — OFFICE VISIT (OUTPATIENT)
Dept: FAMILY MEDICINE CLINIC | Facility: CLINIC | Age: 66
End: 2021-11-23
Payer: COMMERCIAL

## 2021-11-23 VITALS
SYSTOLIC BLOOD PRESSURE: 108 MMHG | DIASTOLIC BLOOD PRESSURE: 69 MMHG | HEIGHT: 61 IN | WEIGHT: 178 LBS | BODY MASS INDEX: 33.61 KG/M2 | HEART RATE: 73 BPM | TEMPERATURE: 98 F

## 2021-11-23 DIAGNOSIS — E11.9 TYPE 2 DIABETES MELLITUS WITHOUT COMPLICATION, WITHOUT LONG-TERM CURRENT USE OF INSULIN (HCC): ICD-10-CM

## 2021-11-23 DIAGNOSIS — E07.9 THYROID DISEASE: Chronic | ICD-10-CM

## 2021-11-23 DIAGNOSIS — N30.00 ACUTE CYSTITIS WITHOUT HEMATURIA: ICD-10-CM

## 2021-11-23 DIAGNOSIS — G47.9 SLEEP DISORDER: ICD-10-CM

## 2021-11-23 DIAGNOSIS — E78.1 HYPERTRIGLYCERIDEMIA: Primary | ICD-10-CM

## 2021-11-23 PROCEDURE — 3008F BODY MASS INDEX DOCD: CPT | Performed by: FAMILY MEDICINE

## 2021-11-23 PROCEDURE — 3074F SYST BP LT 130 MM HG: CPT | Performed by: FAMILY MEDICINE

## 2021-11-23 PROCEDURE — 3078F DIAST BP <80 MM HG: CPT | Performed by: FAMILY MEDICINE

## 2021-11-23 PROCEDURE — 99214 OFFICE O/P EST MOD 30 MIN: CPT | Performed by: FAMILY MEDICINE

## 2021-11-23 RX ORDER — LISINOPRIL 20 MG/1
20 TABLET ORAL DAILY
Qty: 90 TABLET | Refills: 1 | Status: SHIPPED | OUTPATIENT
Start: 2021-11-23 | End: 2022-01-14

## 2021-11-23 RX ORDER — LEVOTHYROXINE SODIUM 0.03 MG/1
25 TABLET ORAL
Qty: 90 TABLET | Refills: 1 | Status: SHIPPED | OUTPATIENT
Start: 2021-11-23 | End: 2022-01-14

## 2021-11-23 RX ORDER — NITROFURANTOIN 25; 75 MG/1; MG/1
100 CAPSULE ORAL 2 TIMES DAILY
Qty: 10 CAPSULE | Refills: 0 | Status: SHIPPED | OUTPATIENT
Start: 2021-11-23 | End: 2021-11-28

## 2021-11-23 RX ORDER — CLONAZEPAM 0.5 MG/1
0.5 TABLET ORAL NIGHTLY PRN
Qty: 30 TABLET | Refills: 0 | Status: SHIPPED | OUTPATIENT
Start: 2021-11-23 | End: 2022-01-17

## 2021-11-23 RX ORDER — FENOFIBRATE 145 MG/1
145 TABLET, COATED ORAL NIGHTLY
Qty: 90 TABLET | Refills: 1 | Status: SHIPPED | OUTPATIENT
Start: 2021-11-23

## 2021-11-23 NOTE — PROGRESS NOTES
Patient presents with:  Urinary Symptoms: c/o frequency, itching, dysuria and odor    HPI:   Marsha Dowling is a 77year old female who presents to clinic with complaints of dysuria with urinary urgency, frequency, malodor and incomplete voiding for the p Refill: 1    3. Hypertriglyceridemia  - fenofibrate 145 MG Oral Tab; Take 1 tablet (145 mg total) by mouth nightly. Dispense: 90 tablet; Refill: 1    4. Acute cystitis without hematuria  - nitrofurantoin monohydrate macro 100 MG Oral Cap;  Take 1 capsule (

## 2022-01-08 ENCOUNTER — TELEPHONE (OUTPATIENT)
Dept: FAMILY MEDICINE CLINIC | Facility: CLINIC | Age: 67
End: 2022-01-08

## 2022-01-08 DIAGNOSIS — R25.2 MUSCLE CRAMP: ICD-10-CM

## 2022-01-08 DIAGNOSIS — G47.9 SLEEP DISTURBANCE: ICD-10-CM

## 2022-01-08 RX ORDER — BACLOFEN 10 MG/1
TABLET ORAL
Qty: 60 TABLET | Refills: 1 | Status: SHIPPED | OUTPATIENT
Start: 2022-01-08 | End: 2022-01-14

## 2022-01-08 NOTE — TELEPHONE ENCOUNTER
Requested prescription sent to pharmacy. Please help patient schedule Medicare annual wellness visit after 02/22/2022. Thank you!

## 2022-01-08 NOTE — TELEPHONE ENCOUNTER
Refill passed per [address] [facility] protocol.     Requested Prescriptions   Pending Prescriptions Disp Refills    SERTRALINE 50 MG Oral Tab [Pharmacy Med Name: SERTRALINE 50MG TABLETS] 90 tablet 0     Sig: TAKE 1 TABLET(50 MG) BY MOUTH DAILY        Psychiatric Non-Scheduled (Anti-Anxiety) Passed - 1/8/2022 10:16 AM        Passed - Appointment in last 6 or next 3 months              Recent Outpatient Visits              1 month ago Hypertriglyceridemia    Enzo Muñiz MD    Office Visit    2 months ago Type 2 diabetes mellitus without complication, without long-term current use of insulin [facility])    [address], [address], Mookie Senior MD    Office Visit    5 months ago Encounter for screening mammogram for malignant neoplasm of breast    [facility], [address], Enzo Ely MD    Office Visit    6 months ago Chest pain, unspecified type    [facility] KATHERINE Arteaga    Office Visit    7 months ago Varicose veins of both lower extremities with pain    Enzo Muñiz MD    Office Visit

## 2022-01-12 NOTE — TELEPHONE ENCOUNTER
Spoke, with the patient and informed her of the message below. Pt did make an appt for her medicare physical on 2-25-22.

## 2022-01-14 DIAGNOSIS — R25.2 MUSCLE CRAMP: ICD-10-CM

## 2022-01-14 DIAGNOSIS — G47.9 SLEEP DISORDER: ICD-10-CM

## 2022-01-14 DIAGNOSIS — E07.9 THYROID DISEASE: Chronic | ICD-10-CM

## 2022-01-14 DIAGNOSIS — E11.9 TYPE 2 DIABETES MELLITUS WITHOUT COMPLICATION, WITHOUT LONG-TERM CURRENT USE OF INSULIN (HCC): ICD-10-CM

## 2022-01-14 DIAGNOSIS — E78.2 MIXED HYPERLIPIDEMIA: ICD-10-CM

## 2022-01-14 RX ORDER — LISINOPRIL 20 MG/1
TABLET ORAL
Qty: 90 TABLET | Refills: 1 | Status: SHIPPED | OUTPATIENT
Start: 2022-01-14 | End: 2022-03-09

## 2022-01-14 RX ORDER — LEVOTHYROXINE SODIUM 0.03 MG/1
TABLET ORAL
Qty: 90 TABLET | Refills: 1 | Status: SHIPPED | OUTPATIENT
Start: 2022-01-14 | End: 2022-03-09

## 2022-01-14 NOTE — TELEPHONE ENCOUNTER
•  clonazePAM 0.5 MG Oral Tab, Take 1 tablet (0.5 mg total) by mouth nightly as needed for Anxiety. , Disp: 30 tablet, Rfl: 0    •  Pravastatin Sodium 10 MG Oral Tab, Take 1 tablet (10 mg total) by mouth nightly., Disp: 90 tablet, Rfl: 1    •  BACLOFEN 10 M

## 2022-01-14 NOTE — TELEPHONE ENCOUNTER
Refill passed per [facility] protocol.       Requested Prescriptions   Pending Prescriptions Disp Refills    LEVOTHYROXINE 25 MCG Oral Tab [Pharmacy Med Name: LEVOTHYROXINE SODIUM 25 MCG Tablet] 90 tablet 1     Sig: TOME 1 TABLETA ANTES DEL DESAYUNO        Thyroid Medication Protocol Passed - 1/14/2022  8:50 AM        Passed - TSH in past 12 months        Passed - Last TSH value is normal     Lab Results   Component Value Date    TSH 2.330 09/10/2021                 Passed - Appointment in past 12 or next 3 months           LISINOPRIL 20 MG Oral Tab [Pharmacy Med Name: LISINOPRIL 20 MG Tablet] 90 tablet 1     Sig: TOME 1 [place] Spies        Hypertensive Medications Protocol Passed - 1/14/2022  8:50 AM        Passed - CMP or BMP in past 12 months        Passed - Appointment in past 6 or next 3 months        Passed - GFR Non- > 50     Lab Results   Component Value Date    [place] [id number] 07/22/2021                        Future Appointments         Provider Department Appt Notes    In 1 month Eulogio Sargent MD [facility] , [address] [place] [address] partner policy informed            Recent Outpatient Visits              1 month ago Hypertriglyceridemia    Tona Smith MD    Office Visit    3 months ago Type 2 diabetes mellitus without complication, without long-term current use of insulin [facility])    [facility], Tona Ayers MD    Office Visit    5 months ago Encounter for screening mammogram for malignant neoplasm of breast    [facility], Tona Ayers MD    Office Visit    7 months ago Chest pain, unspecified type    [facility] KATHERINE Osullivan    Office Visit    7 months ago Varicose veins of both lower extremities with pain    Tona Smith MD    Office Visit

## 2022-01-17 RX ORDER — BACLOFEN 10 MG/1
10 TABLET ORAL DAILY PRN
Qty: 60 TABLET | Refills: 1 | Status: SHIPPED | OUTPATIENT
Start: 2022-01-17

## 2022-01-17 RX ORDER — PRAVASTATIN SODIUM 10 MG
10 TABLET ORAL NIGHTLY
Qty: 90 TABLET | Refills: 1 | Status: SHIPPED | OUTPATIENT
Start: 2022-01-17

## 2022-01-17 RX ORDER — CLONAZEPAM 0.5 MG/1
0.5 TABLET ORAL NIGHTLY PRN
Qty: 30 TABLET | Refills: 0 | Status: SHIPPED | OUTPATIENT
Start: 2022-01-17

## 2022-02-21 ENCOUNTER — TELEPHONE (OUTPATIENT)
Dept: FAMILY MEDICINE CLINIC | Facility: CLINIC | Age: 67
End: 2022-02-21

## 2022-02-21 DIAGNOSIS — E11.9 TYPE 2 DIABETES MELLITUS WITHOUT COMPLICATION, WITHOUT LONG-TERM CURRENT USE OF INSULIN (HCC): Primary | ICD-10-CM

## 2022-02-21 RX ORDER — LANCETS 30 GAUGE
EACH MISCELLANEOUS
Qty: 100 EACH | Refills: 0 | Status: SHIPPED | OUTPATIENT
Start: 2022-02-21 | End: 2022-03-09

## 2022-02-21 RX ORDER — BLOOD SUGAR DIAGNOSTIC
1 STRIP MISCELLANEOUS DAILY
Qty: 100 STRIP | Refills: 0 | Status: SHIPPED | OUTPATIENT
Start: 2022-02-21 | End: 2022-03-09

## 2022-02-21 RX ORDER — BLOOD-GLUCOSE METER
1 EACH MISCELLANEOUS AS DIRECTED
Qty: 1 KIT | Refills: 0 | Status: SHIPPED | OUTPATIENT
Start: 2022-02-21 | End: 2022-03-09

## 2022-02-21 NOTE — TELEPHONE ENCOUNTER
Accu-Chek supplies sent to pharmacy. Please help patient schedule Medicare annual wellness visit her last one was February 2021. Thank you       Type 2 diabetes mellitus without complication, without long-term current use of insulin (HCC)  - Blood Glucose Monitoring Suppl (ACCU-CHEK GUIDE) w/Device Does not apply Kit; 1 kit As Directed. E11.9  Dispense: 1 kit; Refill: 0  - Glucose Blood (ACCU-CHEK GUIDE) In Vitro Strip; 1 strip by In Vitro route daily. E11.9  Dispense: 100 strip; Refill: 0  - Lancets Does not apply Misc; E11.9  Dispense: 100 each;  Refill: 0

## 2022-02-24 ENCOUNTER — TELEPHONE (OUTPATIENT)
Dept: FAMILY MEDICINE CLINIC | Facility: CLINIC | Age: 67
End: 2022-02-24

## 2022-02-24 DIAGNOSIS — I10 ESSENTIAL HYPERTENSION: ICD-10-CM

## 2022-02-24 DIAGNOSIS — E11.9 TYPE 2 DIABETES MELLITUS WITHOUT COMPLICATION, WITHOUT LONG-TERM CURRENT USE OF INSULIN (HCC): Primary | ICD-10-CM

## 2022-02-24 DIAGNOSIS — Z00.00 ENCOUNTER FOR ANNUAL HEALTH EXAMINATION: ICD-10-CM

## 2022-02-24 DIAGNOSIS — E78.2 MIXED HYPERLIPIDEMIA: ICD-10-CM

## 2022-02-24 NOTE — TELEPHONE ENCOUNTER
Okay to inform patient that her lab work has been ordered, can perform prior to her physical    Encounter for annual health examination    - CBC WITH DIFFERENTIAL WITH PLATELET; Future  - COMP METABOLIC PANEL (14); Future  - HEMOGLOBIN A1C; Future  - LIPID PANEL;  Future  - TSH W REFLEX TO FREE T4; Future

## 2022-03-01 ENCOUNTER — LAB ENCOUNTER (OUTPATIENT)
Dept: LAB | Age: 67
End: 2022-03-01
Attending: FAMILY MEDICINE
Payer: MEDICARE

## 2022-03-01 DIAGNOSIS — I10 ESSENTIAL HYPERTENSION: ICD-10-CM

## 2022-03-01 DIAGNOSIS — E11.9 TYPE 2 DIABETES MELLITUS WITHOUT COMPLICATION, WITHOUT LONG-TERM CURRENT USE OF INSULIN (HCC): ICD-10-CM

## 2022-03-01 DIAGNOSIS — E78.2 MIXED HYPERLIPIDEMIA: ICD-10-CM

## 2022-03-01 DIAGNOSIS — Z00.00 ENCOUNTER FOR ANNUAL HEALTH EXAMINATION: ICD-10-CM

## 2022-03-01 LAB
ALBUMIN SERPL-MCNC: 4.1 G/DL (ref 3.4–5)
ALBUMIN/GLOB SERPL: 1.2 {RATIO} (ref 1–2)
ALP LIVER SERPL-CCNC: 72 U/L
ALT SERPL-CCNC: 35 U/L
ANION GAP SERPL CALC-SCNC: 8 MMOL/L (ref 0–18)
AST SERPL-CCNC: 25 U/L (ref 15–37)
BASOPHILS # BLD AUTO: 0.03 X10(3) UL (ref 0–0.2)
BASOPHILS NFR BLD AUTO: 0.5 %
BILIRUB SERPL-MCNC: 0.6 MG/DL (ref 0.1–2)
BUN BLD-MCNC: 19 MG/DL (ref 7–18)
BUN/CREAT SERPL: 25 (ref 10–20)
CALCIUM BLD-MCNC: 9.5 MG/DL (ref 8.5–10.1)
CHLORIDE SERPL-SCNC: 109 MMOL/L (ref 98–112)
CHOLEST SERPL-MCNC: 139 MG/DL (ref ?–200)
CO2 SERPL-SCNC: 26 MMOL/L (ref 21–32)
CREAT BLD-MCNC: 0.76 MG/DL
DEPRECATED RDW RBC AUTO: 45.1 FL (ref 35.1–46.3)
EOSINOPHIL # BLD AUTO: 0.08 X10(3) UL (ref 0–0.7)
EOSINOPHIL NFR BLD AUTO: 1.4 %
EST. AVERAGE GLUCOSE BLD GHB EST-MCNC: 126 MG/DL (ref 68–126)
FASTING PATIENT LIPID ANSWER: YES
FASTING STATUS PATIENT QL REPORTED: YES
GLOBULIN PLAS-MCNC: 3.4 G/DL (ref 2.8–4.4)
GLUCOSE BLD-MCNC: 93 MG/DL (ref 70–99)
HBA1C MFR BLD: 6 % (ref ?–5.7)
HCT VFR BLD AUTO: 43.6 %
HDLC SERPL-MCNC: 49 MG/DL (ref 40–59)
HGB BLD-MCNC: 14.1 G/DL
IMM GRANULOCYTES # BLD AUTO: 0.03 X10(3) UL (ref 0–1)
IMM GRANULOCYTES NFR BLD: 0.5 %
LDLC SERPL CALC-MCNC: 72 MG/DL (ref ?–100)
LYMPHOCYTES # BLD AUTO: 1.85 X10(3) UL (ref 1–4)
LYMPHOCYTES NFR BLD AUTO: 31.3 %
MCH RBC QN AUTO: 30.7 PG (ref 26–34)
MCHC RBC AUTO-ENTMCNC: 32.3 G/DL (ref 31–37)
MCV RBC AUTO: 94.8 FL
MONOCYTES # BLD AUTO: 0.38 X10(3) UL (ref 0.1–1)
MONOCYTES NFR BLD AUTO: 6.4 %
NEUTROPHILS # BLD AUTO: 3.54 X10 (3) UL (ref 1.5–7.7)
NEUTROPHILS # BLD AUTO: 3.54 X10(3) UL (ref 1.5–7.7)
NEUTROPHILS NFR BLD AUTO: 59.9 %
NONHDLC SERPL-MCNC: 90 MG/DL (ref ?–130)
OSMOLALITY SERPL CALC.SUM OF ELEC: 298 MOSM/KG (ref 275–295)
PLATELET # BLD AUTO: 233 10(3)UL (ref 150–450)
POTASSIUM SERPL-SCNC: 4 MMOL/L (ref 3.5–5.1)
PROT SERPL-MCNC: 7.5 G/DL (ref 6.4–8.2)
RBC # BLD AUTO: 4.6 X10(6)UL
TRIGL SERPL-MCNC: 96 MG/DL (ref 30–149)
TSI SER-ACNC: 2.11 MIU/ML (ref 0.36–3.74)
VLDLC SERPL CALC-MCNC: 15 MG/DL (ref 0–30)
WBC # BLD AUTO: 5.9 X10(3) UL (ref 4–11)

## 2022-03-01 PROCEDURE — 83036 HEMOGLOBIN GLYCOSYLATED A1C: CPT

## 2022-03-01 PROCEDURE — 80053 COMPREHEN METABOLIC PANEL: CPT

## 2022-03-01 PROCEDURE — 84443 ASSAY THYROID STIM HORMONE: CPT

## 2022-03-01 PROCEDURE — 80061 LIPID PANEL: CPT

## 2022-03-01 PROCEDURE — 85025 COMPLETE CBC W/AUTO DIFF WBC: CPT

## 2022-03-01 PROCEDURE — 3044F HG A1C LEVEL LT 7.0%: CPT | Performed by: FAMILY MEDICINE

## 2022-03-01 PROCEDURE — 36415 COLL VENOUS BLD VENIPUNCTURE: CPT

## 2022-03-04 ENCOUNTER — OFFICE VISIT (OUTPATIENT)
Dept: FAMILY MEDICINE CLINIC | Facility: CLINIC | Age: 67
End: 2022-03-04
Payer: COMMERCIAL

## 2022-03-04 VITALS
TEMPERATURE: 97 F | SYSTOLIC BLOOD PRESSURE: 149 MMHG | WEIGHT: 179 LBS | HEART RATE: 71 BPM | BODY MASS INDEX: 33.79 KG/M2 | HEIGHT: 61 IN | DIASTOLIC BLOOD PRESSURE: 80 MMHG

## 2022-03-04 DIAGNOSIS — E78.2 MIXED HYPERLIPIDEMIA: Chronic | ICD-10-CM

## 2022-03-04 DIAGNOSIS — Z23 NEED FOR SHINGLES VACCINE: ICD-10-CM

## 2022-03-04 DIAGNOSIS — E07.9 THYROID DISEASE: Chronic | ICD-10-CM

## 2022-03-04 DIAGNOSIS — D69.2 SENILE PURPURA (HCC): ICD-10-CM

## 2022-03-04 DIAGNOSIS — I83.12 VARICOSE VEINS OF BOTH LOWER EXTREMITIES WITH INFLAMMATION: ICD-10-CM

## 2022-03-04 DIAGNOSIS — I83.11 VARICOSE VEINS OF BOTH LOWER EXTREMITIES WITH INFLAMMATION: ICD-10-CM

## 2022-03-04 DIAGNOSIS — K21.9 GASTROESOPHAGEAL REFLUX DISEASE WITHOUT ESOPHAGITIS: ICD-10-CM

## 2022-03-04 DIAGNOSIS — M19.90 ARTHRITIS: ICD-10-CM

## 2022-03-04 DIAGNOSIS — E55.9 VITAMIN D DEFICIENCY: ICD-10-CM

## 2022-03-04 DIAGNOSIS — Z12.11 COLON CANCER SCREENING: ICD-10-CM

## 2022-03-04 DIAGNOSIS — J41.0 SMOKERS' COUGH (HCC): ICD-10-CM

## 2022-03-04 DIAGNOSIS — G47.9 SLEEP DISORDER: ICD-10-CM

## 2022-03-04 DIAGNOSIS — E11.9 TYPE 2 DIABETES MELLITUS WITHOUT COMPLICATION, WITHOUT LONG-TERM CURRENT USE OF INSULIN (HCC): Chronic | ICD-10-CM

## 2022-03-04 DIAGNOSIS — I10 ESSENTIAL HYPERTENSION: Primary | Chronic | ICD-10-CM

## 2022-03-04 PROBLEM — I83.90 VARICOSE VEINS OF LOWER EXTREMITY: Status: ACTIVE | Noted: 2022-03-04

## 2022-03-04 PROCEDURE — 3077F SYST BP >= 140 MM HG: CPT | Performed by: FAMILY MEDICINE

## 2022-03-04 PROCEDURE — 3079F DIAST BP 80-89 MM HG: CPT | Performed by: FAMILY MEDICINE

## 2022-03-04 PROCEDURE — 3008F BODY MASS INDEX DOCD: CPT | Performed by: FAMILY MEDICINE

## 2022-03-04 PROCEDURE — 99397 PER PM REEVAL EST PAT 65+ YR: CPT | Performed by: FAMILY MEDICINE

## 2022-03-04 PROCEDURE — G0439 PPPS, SUBSEQ VISIT: HCPCS | Performed by: FAMILY MEDICINE

## 2022-03-04 PROCEDURE — 96160 PT-FOCUSED HLTH RISK ASSMT: CPT | Performed by: FAMILY MEDICINE

## 2022-03-04 RX ORDER — DAPAGLIFLOZIN 10 MG/1
TABLET, FILM COATED ORAL
COMMUNITY
Start: 2022-01-14

## 2022-03-04 RX ORDER — ZOSTER VACCINE RECOMBINANT, ADJUVANTED 50 MCG/0.5
0.5 KIT INTRAMUSCULAR ONCE
Qty: 1 EACH | Refills: 0 | Status: SHIPPED | OUTPATIENT
Start: 2022-03-04 | End: 2022-03-04

## 2022-03-04 RX ORDER — CLONAZEPAM 0.5 MG/1
0.5 TABLET ORAL NIGHTLY PRN
Qty: 30 TABLET | Refills: 0 | Status: SHIPPED | OUTPATIENT
Start: 2022-03-04

## 2022-03-08 DIAGNOSIS — E07.9 THYROID DISEASE: Chronic | ICD-10-CM

## 2022-03-08 DIAGNOSIS — E11.9 TYPE 2 DIABETES MELLITUS WITHOUT COMPLICATION, WITHOUT LONG-TERM CURRENT USE OF INSULIN (HCC): ICD-10-CM

## 2022-03-08 NOTE — TELEPHONE ENCOUNTER
[facility]:  LISINOPRIL 20 MG Oral Tab 90 tablet 1 1/14/2022     Sig: TOME 1 TABLETA DIARIAMENTE    Sent to pharmacy as: Lisinopril 20 MG Oral Tablet    E-Prescribing Status: Receipt confirmed by pharmacy (1/14/2022 11:41 AM CST)      LEVOTHYROXINE 25 MCG Oral Tab 90 tablet 1 1/14/2022     Sig: TOME 1 TABLETA ANTES DEL DESAYUNO    Sent to pharmacy as: Levothyroxine Sodium 25 MCG Oral Tablet (Synthroid)    E-Prescribing Status: Receipt confirmed by pharmacy (1/14/2022 11:41 AM CST)        [facility]:  Glucose Blood In Vitro Strip 100 strip 2 10/11/2021     Sig: Check BG 1-2 times daily    Class: Print Script    Notes to Pharmacy: PATIENT USES FREESTYLE LITE      FreeStyle Lancets Does not apply Misc 100 each 1 10/11/2021     Sig: Use once or twice daily    Class: Print Script

## 2022-03-08 NOTE — TELEPHONE ENCOUNTER
We need to know what pharmacy she prefers. She has scripts at both local and mail order. Please confirm.

## 2022-03-09 RX ORDER — LISINOPRIL 20 MG/1
20 TABLET ORAL DAILY
Qty: 90 TABLET | Refills: 0 | Status: SHIPPED | OUTPATIENT
Start: 2022-03-09 | End: 2022-06-10

## 2022-03-09 RX ORDER — LANCETS 30 GAUGE
1 EACH MISCELLANEOUS DAILY
Qty: 100 EACH | Refills: 1 | Status: SHIPPED | OUTPATIENT
Start: 2022-03-09 | End: 2023-03-01

## 2022-03-09 RX ORDER — LEVOTHYROXINE SODIUM 0.03 MG/1
25 TABLET ORAL
Qty: 90 TABLET | Refills: 0 | Status: SHIPPED | OUTPATIENT
Start: 2022-03-09 | End: 2023-01-31

## 2022-03-09 RX ORDER — BLOOD SUGAR DIAGNOSTIC
1 STRIP MISCELLANEOUS DAILY
Qty: 100 EACH | Refills: 1 | Status: SHIPPED | OUTPATIENT
Start: 2022-03-09 | End: 2022-04-06

## 2022-03-09 NOTE — TELEPHONE ENCOUNTER
Spoke to patient and she states her insurance changed and no longer uses [employer] order.  Remaining refills for lisinopril and levothyroxine were sent to [facility] as requested

## 2022-03-09 NOTE — TELEPHONE ENCOUNTER
Refill passed per [facility] protocol. Requested Prescriptions   Pending Prescriptions Disp Refills    Glucose Blood (ONETOUCH ULTRA) In Vitro Strip 100 each 1     Si each by Other route daily. Diabetic Supplies Protocol Passed - 3/9/2022 10:32 AM        Passed - Appointment in past 12 or next 3 months           OneTouch Delica Lancets 67W Does not apply Misc 100 each 1     Si lancet by Finger stick route daily. Diabetic Supplies Protocol Passed - 3/9/2022 10:32 AM        Passed - Appointment in past 12 or next 3 months          Signed Prescriptions Disp Refills    levothyroxine 25 MCG Oral Tab 90 tablet 0     Sig: Take 1 tablet (25 mcg total) by mouth before breakfast. TOME 1 TABLETA ANTES DEL DESAYUNO        There is no refill protocol information for this order        lisinopril 20 MG Oral Tab 90 tablet 0     Sig: Take 1 tablet (20 mg total) by mouth daily.         There is no refill protocol information for this order               Recent Outpatient Visits              5 days ago Essential hypertension    [facility], [address], Clarice Snellen, MD    Office Visit    3 months ago Hypertriglyceridemia    [facility], Clarice Snellen, MD    Office Visit    4 months ago Type 2 diabetes mellitus without complication, without long-term current use of insulin [facility]    [facility], [place], [address], Clarice Snellen, MD    Office Visit    7 months ago Encounter for screening mammogram for malignant neoplasm of breast    [facility] [facility], [address], Clarice Snellen, MD    Office Visit    8 months ago Chest pain, unspecified type    [facility] KATHERINE Whitmore    Office Visit

## 2022-04-06 ENCOUNTER — LAB ENCOUNTER (OUTPATIENT)
Dept: LAB | Age: 67
End: 2022-04-06
Attending: FAMILY MEDICINE
Payer: MEDICARE

## 2022-04-06 DIAGNOSIS — Z12.11 COLON CANCER SCREENING: ICD-10-CM

## 2022-04-06 LAB — HEMOCCULT STL QL: NEGATIVE

## 2022-04-06 PROCEDURE — 82274 ASSAY TEST FOR BLOOD FECAL: CPT

## 2022-04-06 RX ORDER — BLOOD SUGAR DIAGNOSTIC
1 STRIP MISCELLANEOUS DAILY
Qty: 100 STRIP | Refills: 3 | Status: SHIPPED | OUTPATIENT
Start: 2022-04-06 | End: 2023-03-01

## 2022-04-06 NOTE — TELEPHONE ENCOUNTER
Per pharmacy pt is requesting new rx for the [facility] verio flex TEST STRIPS. Per pharmacy pt changed machine due to insurance changes.

## 2022-04-06 NOTE — TELEPHONE ENCOUNTER
Refill passed per [facility] protocol. Requested Prescriptions   Pending Prescriptions Disp Refills    Glucose Blood (ONETOUCH VERIO) In Vitro Strip 100 strip 3     Si strip by Finger stick route daily.         Diabetic Supplies Protocol Passed - 2022  2:10 PM        Passed - Appointment in past 12 or next 3 months               Future Appointments         Provider Department Appt Notes    In 5 months [facility] Myles MD [facility] , [place]  Colon cancer screening            Recent Outpatient Visits              1 month ago Essential hypertension    [place] [address], Anahy Giron MD    Office Visit    4 months ago Hypertriglyceridemia    Anahy Carroll MD    Office Visit    5 months ago Type 2 diabetes mellitus without complication, without long-term current use of insulin [facility]    Office Visit    8 months ago En[facility] for screening mamm[facility] for malignant neoplasm [facility] breast    [facility]    Office Visit    9 months ago Chest pain, unspecified type    [facility] KATHERINE Thornton    Office Visit

## 2022-05-03 ENCOUNTER — TELEPHONE (OUTPATIENT)
Dept: FAMILY MEDICINE CLINIC | Facility: CLINIC | Age: 67
End: 2022-05-03

## 2022-05-06 NOTE — TELEPHONE ENCOUNTER
Please verify with patient what the indication for the parking placard is? The form is asking me for a diagnosis. Please ask patient so that I can fill out the form.

## 2022-05-16 NOTE — TELEPHONE ENCOUNTER
Patient calling for update on form and asking if ready to pickup. Please call with  at [phone],[facility].

## 2022-05-16 NOTE — TELEPHONE ENCOUNTER
Informed patient form is ready for . Due to personal issues was unable to attend last appointment and requested a new appointment. Patient informed provider will be unavailable for the next 2 weeks, offered appointment with another provider but patient declined and appointment scheduled with Dr. Flash Ng.

## 2022-06-03 DIAGNOSIS — G47.9 SLEEP DISTURBANCE: ICD-10-CM

## 2022-06-03 NOTE — TELEPHONE ENCOUNTER
Refill passed per [facility] protocol.     Requested Prescriptions   Pending Prescriptions Disp Refills    SERTRALINE 50 MG Oral Tab [Pharmacy Med Name: SERTRALINE 50MG TABLETS] 90 tablet 1     Sig: TAKE 1 TABLET(50 MG) BY MOUTH DAILY        Psychiatric Non-Scheduled (Anti-Anxiety) Passed - 6/3/2022  5:47 AM        Passed - Appointment in last 6 or next 3 months              Recent Outpatient Visits              3 months ago Essential hypertension    [facility], [place] [address], Adry Llamas MD    Office Visit    6 months ago Hypertriglyceridemia    [address] Adry Peterson MD    Office Visit    7 months ago Type 2 diabetes mellitus without complication, without long-term current use of insulin [facility])    [facility], Mookie Ayers MD    Office Visit    10 months ago Encounter for screening mammogram for malignant neoplasm of breast    [facility], [place], Adry Llamas MD    Office Visit    11 months ago Chest pain, unspecified type    [facility] KATHERINE Ruiz    Office Visit            Future Appointments         Provider Department Appt Notes    Today Tosha Mathew MD [facility], [place] [address], [facility]     In 3 months Jessica Veliz, [address] [facility] [place], [facility], Mookie Ayers  Colon cancer screening

## 2022-06-06 ENCOUNTER — NURSE TRIAGE (OUTPATIENT)
Dept: FAMILY MEDICINE CLINIC | Facility: CLINIC | Age: 67
End: 2022-06-06

## 2022-06-10 ENCOUNTER — OFFICE VISIT (OUTPATIENT)
Dept: FAMILY MEDICINE CLINIC | Facility: CLINIC | Age: 67
End: 2022-06-10
Payer: COMMERCIAL

## 2022-06-10 VITALS
TEMPERATURE: 98 F | BODY MASS INDEX: 33.23 KG/M2 | SYSTOLIC BLOOD PRESSURE: 99 MMHG | HEART RATE: 69 BPM | DIASTOLIC BLOOD PRESSURE: 66 MMHG | WEIGHT: 176 LBS | HEIGHT: 61 IN

## 2022-06-10 DIAGNOSIS — E11.9 TYPE 2 DIABETES MELLITUS WITHOUT COMPLICATION, WITHOUT LONG-TERM CURRENT USE OF INSULIN (HCC): ICD-10-CM

## 2022-06-10 DIAGNOSIS — G47.9 SLEEP DISORDER: ICD-10-CM

## 2022-06-10 DIAGNOSIS — N76.0 ACUTE VAGINITIS: Primary | ICD-10-CM

## 2022-06-10 LAB
CARTRIDGE LOT#: ABNORMAL NUMERIC
HEMOGLOBIN A1C: 5.7 % (ref 4.3–5.6)

## 2022-06-10 PROCEDURE — 3074F SYST BP LT 130 MM HG: CPT | Performed by: FAMILY MEDICINE

## 2022-06-10 PROCEDURE — 3008F BODY MASS INDEX DOCD: CPT | Performed by: FAMILY MEDICINE

## 2022-06-10 PROCEDURE — 3078F DIAST BP <80 MM HG: CPT | Performed by: FAMILY MEDICINE

## 2022-06-10 PROCEDURE — 83036 HEMOGLOBIN GLYCOSYLATED A1C: CPT | Performed by: FAMILY MEDICINE

## 2022-06-10 PROCEDURE — 1126F AMNT PAIN NOTED NONE PRSNT: CPT | Performed by: FAMILY MEDICINE

## 2022-06-10 PROCEDURE — 3044F HG A1C LEVEL LT 7.0%: CPT | Performed by: FAMILY MEDICINE

## 2022-06-10 PROCEDURE — 99214 OFFICE O/P EST MOD 30 MIN: CPT | Performed by: FAMILY MEDICINE

## 2022-06-10 RX ORDER — LANCING DEVICE
EACH MISCELLANEOUS
Qty: 1 EACH | Refills: 0 | Status: SHIPPED | OUTPATIENT
Start: 2022-06-10

## 2022-06-10 RX ORDER — SITAGLIPTIN 100 MG/1
100 TABLET, FILM COATED ORAL DAILY
Qty: 90 TABLET | Refills: 1 | Status: SHIPPED | OUTPATIENT
Start: 2022-06-10

## 2022-06-10 RX ORDER — DAPAGLIFLOZIN 10 MG/1
10 TABLET, FILM COATED ORAL DAILY
Qty: 90 TABLET | Refills: 1 | Status: SHIPPED | OUTPATIENT
Start: 2022-06-10

## 2022-06-10 RX ORDER — CLONAZEPAM 0.5 MG/1
0.5 TABLET ORAL NIGHTLY PRN
Qty: 30 TABLET | Refills: 0 | Status: SHIPPED | OUTPATIENT
Start: 2022-06-10

## 2022-06-10 RX ORDER — FLUCONAZOLE 200 MG/1
200 TABLET ORAL ONCE
Qty: 5 TABLET | Refills: 0 | Status: SHIPPED | OUTPATIENT
Start: 2022-06-10 | End: 2022-06-10

## 2022-06-10 RX ORDER — LISINOPRIL 20 MG/1
20 TABLET ORAL DAILY
Qty: 90 TABLET | Refills: 0 | Status: SHIPPED | OUTPATIENT
Start: 2022-06-10

## 2022-06-10 RX ORDER — LANCING DEVICE/LANCETS
1 KIT MISCELLANEOUS DAILY
Qty: 100 EACH | Refills: 3 | Status: SHIPPED | OUTPATIENT
Start: 2022-06-10

## 2022-06-20 ENCOUNTER — TELEPHONE (OUTPATIENT)
Dept: FAMILY MEDICINE CLINIC | Facility: CLINIC | Age: 67
End: 2022-06-20

## 2022-06-20 NOTE — TELEPHONE ENCOUNTER
Informed patient she can purchase hydrocortisone 1% otc and apply to vulva per MD recommendation and patient voiced understanding. No other questions/concerns at this time.

## 2022-06-20 NOTE — TELEPHONE ENCOUNTER
Patient reports visit 6/10 and was put on Fluconazole for 5 days, was also using a cream for yeast infection however symptoms continue of constant itching, discomfort and discharge, no odor or any other symptoms. Requesting further recommendations please advise.

## 2022-08-05 DIAGNOSIS — M19.90 ARTHRITIS: ICD-10-CM

## 2022-08-05 RX ORDER — HYDROCODONE BITARTRATE AND ACETAMINOPHEN 10; 325 MG/1; MG/1
1 TABLET ORAL EVERY 6 HOURS PRN
Qty: 90 TABLET | Refills: 0 | Status: SHIPPED | OUTPATIENT
Start: 2022-08-05

## 2022-08-05 NOTE — TELEPHONE ENCOUNTER
Patient requesting script refill for rx [place] for her arthritis. Please call with  to update at [phone],[email or web address].

## 2022-08-22 ENCOUNTER — LAB ENCOUNTER (OUTPATIENT)
Dept: LAB | Age: 67
End: 2022-08-22
Attending: FAMILY MEDICINE
Payer: MEDICARE

## 2022-08-22 ENCOUNTER — OFFICE VISIT (OUTPATIENT)
Dept: FAMILY MEDICINE CLINIC | Facility: CLINIC | Age: 67
End: 2022-08-22
Payer: COMMERCIAL

## 2022-08-22 VITALS
BODY MASS INDEX: 33.68 KG/M2 | WEIGHT: 178.38 LBS | SYSTOLIC BLOOD PRESSURE: 102 MMHG | HEART RATE: 64 BPM | DIASTOLIC BLOOD PRESSURE: 69 MMHG | HEIGHT: 61 IN | TEMPERATURE: 97 F

## 2022-08-22 DIAGNOSIS — E11.9 TYPE 2 DIABETES MELLITUS WITHOUT COMPLICATION, WITHOUT LONG-TERM CURRENT USE OF INSULIN (HCC): ICD-10-CM

## 2022-08-22 DIAGNOSIS — B35.1 ONYCHOMYCOSIS: ICD-10-CM

## 2022-08-22 DIAGNOSIS — M19.049 HAND ARTHRITIS: ICD-10-CM

## 2022-08-22 DIAGNOSIS — E11.9 TYPE 2 DIABETES MELLITUS WITHOUT COMPLICATION, WITHOUT LONG-TERM CURRENT USE OF INSULIN (HCC): Primary | ICD-10-CM

## 2022-08-22 LAB
ALBUMIN SERPL-MCNC: 3.9 G/DL (ref 3.4–5)
ALBUMIN/GLOB SERPL: 1.1 {RATIO} (ref 1–2)
ALP LIVER SERPL-CCNC: 104 U/L
ALT SERPL-CCNC: 62 U/L
ANION GAP SERPL CALC-SCNC: 7 MMOL/L (ref 0–18)
AST SERPL-CCNC: 40 U/L (ref 15–37)
BILIRUB SERPL-MCNC: 0.4 MG/DL (ref 0.1–2)
BUN BLD-MCNC: 15 MG/DL (ref 7–18)
BUN/CREAT SERPL: 26.3 (ref 10–20)
CALCIUM BLD-MCNC: 9.7 MG/DL (ref 8.5–10.1)
CHLORIDE SERPL-SCNC: 106 MMOL/L (ref 98–112)
CHOLEST SERPL-MCNC: 166 MG/DL (ref ?–200)
CO2 SERPL-SCNC: 28 MMOL/L (ref 21–32)
CREAT BLD-MCNC: 0.57 MG/DL
CREAT UR-SCNC: 90.1 MG/DL
EST. AVERAGE GLUCOSE BLD GHB EST-MCNC: 126 MG/DL (ref 68–126)
FASTING PATIENT LIPID ANSWER: NO
FASTING STATUS PATIENT QL REPORTED: NO
GFR SERPLBLD BASED ON 1.73 SQ M-ARVRAT: 100 ML/MIN/1.73M2 (ref 60–?)
GLOBULIN PLAS-MCNC: 3.6 G/DL (ref 2.8–4.4)
GLUCOSE BLD-MCNC: 79 MG/DL (ref 70–99)
HBA1C MFR BLD: 6 % (ref ?–5.7)
HDLC SERPL-MCNC: 39 MG/DL (ref 40–59)
LDLC SERPL CALC-MCNC: 79 MG/DL (ref ?–100)
MICROALBUMIN UR-MCNC: 1.24 MG/DL
MICROALBUMIN/CREAT 24H UR-RTO: 13.8 UG/MG (ref ?–30)
NONHDLC SERPL-MCNC: 127 MG/DL (ref ?–130)
OSMOLALITY SERPL CALC.SUM OF ELEC: 292 MOSM/KG (ref 275–295)
POTASSIUM SERPL-SCNC: 3.9 MMOL/L (ref 3.5–5.1)
PROT SERPL-MCNC: 7.5 G/DL (ref 6.4–8.2)
SODIUM SERPL-SCNC: 141 MMOL/L (ref 136–145)
TRIGL SERPL-MCNC: 296 MG/DL (ref 30–149)
VLDLC SERPL CALC-MCNC: 47 MG/DL (ref 0–30)

## 2022-08-22 PROCEDURE — 80053 COMPREHEN METABOLIC PANEL: CPT

## 2022-08-22 PROCEDURE — 99214 OFFICE O/P EST MOD 30 MIN: CPT | Performed by: FAMILY MEDICINE

## 2022-08-22 PROCEDURE — 82570 ASSAY OF URINE CREATININE: CPT

## 2022-08-22 PROCEDURE — 3074F SYST BP LT 130 MM HG: CPT | Performed by: FAMILY MEDICINE

## 2022-08-22 PROCEDURE — 3008F BODY MASS INDEX DOCD: CPT | Performed by: FAMILY MEDICINE

## 2022-08-22 PROCEDURE — 80061 LIPID PANEL: CPT

## 2022-08-22 PROCEDURE — 82043 UR ALBUMIN QUANTITATIVE: CPT

## 2022-08-22 PROCEDURE — 83036 HEMOGLOBIN GLYCOSYLATED A1C: CPT

## 2022-08-22 PROCEDURE — 36415 COLL VENOUS BLD VENIPUNCTURE: CPT

## 2022-08-22 PROCEDURE — 3078F DIAST BP <80 MM HG: CPT | Performed by: FAMILY MEDICINE

## 2022-08-22 RX ORDER — DAPAGLIFLOZIN 10 MG/1
10 TABLET, FILM COATED ORAL DAILY
Qty: 90 TABLET | Refills: 1 | Status: SHIPPED | OUTPATIENT
Start: 2022-08-22

## 2022-08-22 RX ORDER — TERBINAFINE HYDROCHLORIDE 250 MG/1
250 TABLET ORAL DAILY
Qty: 90 TABLET | Refills: 0 | Status: SHIPPED | OUTPATIENT
Start: 2022-08-22 | End: 2022-11-20

## 2022-08-22 RX ORDER — LISINOPRIL 20 MG/1
20 TABLET ORAL DAILY
Qty: 90 TABLET | Refills: 0 | Status: SHIPPED | OUTPATIENT
Start: 2022-08-22

## 2022-08-22 RX ORDER — DULOXETIN HYDROCHLORIDE 30 MG/1
30 CAPSULE, DELAYED RELEASE ORAL DAILY
Qty: 90 CAPSULE | Refills: 0 | Status: SHIPPED | OUTPATIENT
Start: 2022-08-22

## 2022-08-22 RX ORDER — SITAGLIPTIN 100 MG/1
100 TABLET, FILM COATED ORAL DAILY
Qty: 90 TABLET | Refills: 1 | Status: SHIPPED | OUTPATIENT
Start: 2022-08-22

## 2022-09-01 DIAGNOSIS — E78.1 HYPERTRIGLYCERIDEMIA: ICD-10-CM

## 2022-09-02 RX ORDER — FENOFIBRATE 145 MG/1
145 TABLET, COATED ORAL NIGHTLY
Qty: 90 TABLET | Refills: 1 | Status: SHIPPED | OUTPATIENT
Start: 2022-09-02

## 2022-09-02 NOTE — TELEPHONE ENCOUNTER
Refill passed per [address] [place] [address] protocol.   Requested Prescriptions   Pending Prescriptions Disp Refills    FENOFIBRATE 145 MG Oral Tab [Pharmacy Med Name: FENOFIBRATE 145MG TABLETS] 90 tablet 1     Sig: TAKE 1 TABLET(145 MG) BY MOUTH EVERY NIGHT        Cholesterol Medication Protocol Passed - 9/1/2022  5:50 AM        Passed - ALT in past 12 months        Passed - LDL in past 12 months        Passed - Last ALT < 80       Lab Results   Component Value Date    ALT 62 (H) 08/22/2022             Passed - Last LDL < 130     Lab Results   Component Value Date    LDL 79 08/22/2022               Passed - In person appointment or virtual visit in the past 12 mos or appointment in next 3 mos       Recent Outpatient Visits              1 week ago Type 2 diabetes mellitus without complication, without long-term current use of insulin [facility])    [address] [place], [place] [address] Sharl Sartorius, MD    Office Visit    2 months ago Acute vaginitis    [address] Blake Peterson MD    Office Visit    6 months ago Essential hypertension    [address] [place], [place] [address] Sharl Sartorius, MD    Office Visit    9 months ago Hypertriglyceridemia    [address] [place], [place] [address] Sharl Sartorius, MD    Office Visit    10 months ago Type 2 diabetes mellitus without complication, without long-term current use of insulin [facility])    [address] [place], [place] [address] Sharl Sartorius, MD    Office Visit     Future Appointments         Provider Department Appt Notes    In 2 weeks Joaquín Menjivar MD [address] [place], [place] [address], [place]  Colon cancer screening                   Recent Outpatient Visits              1 week ago Type 2 diabetes mellitus without complication, without long-term current use of insulin [facility])    [address] [place], [place] [address] Sharl Sartorius, MD    Office Visit    2 months ago Acute vaginitis    [address] [place] [address] [facility], [address] [place] [address] Yoni Guidry, MD    Office Visit    6 months ago Essential hypertension    [address] Hal Peterson MD    Office Visit    9 months ago Hypertriglyceridemia    [address] Hal Peterson MD    Office Visit    10 months ago Type 2 diabetes mellitus without complication, without long-term current use of insulin [facility])    [address] Peterson MD    Office Visit          Future Appointments         Provider Department Appt Notes    In 2 weeks Gina Barrera MD [address] [facility], [place] [address], [place]  Colon cancer screening

## 2022-09-07 PROBLEM — E78.1 HYPERTRIGLYCERIDEMIA: Status: ACTIVE | Noted: 2022-09-07

## 2022-09-07 PROBLEM — R79.89 ELEVATED LFTS: Status: ACTIVE | Noted: 2022-09-07

## 2022-09-16 ENCOUNTER — OFFICE VISIT (OUTPATIENT)
Dept: GASTROENTEROLOGY | Facility: CLINIC | Age: 67
End: 2022-09-16
Payer: COMMERCIAL

## 2022-09-16 ENCOUNTER — TELEPHONE (OUTPATIENT)
Dept: GASTROENTEROLOGY | Facility: CLINIC | Age: 67
End: 2022-09-16

## 2022-09-16 ENCOUNTER — LAB ENCOUNTER (OUTPATIENT)
Dept: LAB | Age: 67
End: 2022-09-16
Attending: INTERNAL MEDICINE

## 2022-09-16 VITALS
HEART RATE: 64 BPM | BODY MASS INDEX: 33.04 KG/M2 | DIASTOLIC BLOOD PRESSURE: 68 MMHG | SYSTOLIC BLOOD PRESSURE: 103 MMHG | HEIGHT: 61 IN | WEIGHT: 175 LBS

## 2022-09-16 DIAGNOSIS — R74.8 ABNORMAL TRANSAMINASES: ICD-10-CM

## 2022-09-16 DIAGNOSIS — Z12.11 ENCOUNTER FOR SCREENING COLONOSCOPY: ICD-10-CM

## 2022-09-16 DIAGNOSIS — K59.04 CHRONIC IDIOPATHIC CONSTIPATION: Primary | ICD-10-CM

## 2022-09-16 DIAGNOSIS — Z12.11 COLON CANCER SCREENING: Primary | ICD-10-CM

## 2022-09-16 LAB
DEPRECATED HBV CORE AB SER IA-ACNC: 181.4 NG/ML
HAV AB SER QL IA: REACTIVE
HBV CORE AB SERPL QL IA: NONREACTIVE
HBV SURFACE AB SER QL: NONREACTIVE
HBV SURFACE AB SERPL IA-ACNC: <3.1 MIU/ML
HBV SURFACE AG SERPL QL IA: NONREACTIVE
HCV AB SERPL QL IA: NONREACTIVE
IRON SATN MFR SERPL: 19 %
IRON SERPL-MCNC: 75 UG/DL
TIBC SERPL-MCNC: 387 UG/DL (ref 240–450)
TRANSFERRIN SERPL-MCNC: 260 MG/DL (ref 200–360)

## 2022-09-16 PROCEDURE — 80503 PATH CLIN CONSLTJ SF 5-20: CPT

## 2022-09-16 PROCEDURE — 86706 HEP B SURFACE ANTIBODY: CPT

## 2022-09-16 PROCEDURE — 3008F BODY MASS INDEX DOCD: CPT | Performed by: INTERNAL MEDICINE

## 2022-09-16 PROCEDURE — 99203 OFFICE O/P NEW LOW 30 MIN: CPT | Performed by: INTERNAL MEDICINE

## 2022-09-16 PROCEDURE — 3078F DIAST BP <80 MM HG: CPT | Performed by: INTERNAL MEDICINE

## 2022-09-16 PROCEDURE — 86708 HEPATITIS A ANTIBODY: CPT

## 2022-09-16 PROCEDURE — 83540 ASSAY OF IRON: CPT

## 2022-09-16 PROCEDURE — 86704 HEP B CORE ANTIBODY TOTAL: CPT

## 2022-09-16 PROCEDURE — 82728 ASSAY OF FERRITIN: CPT

## 2022-09-16 PROCEDURE — 36415 COLL VENOUS BLD VENIPUNCTURE: CPT

## 2022-09-16 PROCEDURE — 84466 ASSAY OF TRANSFERRIN: CPT

## 2022-09-16 PROCEDURE — 86709 HEPATITIS A IGM ANTIBODY: CPT

## 2022-09-16 PROCEDURE — 86803 HEPATITIS C AB TEST: CPT

## 2022-09-16 PROCEDURE — 87340 HEPATITIS B SURFACE AG IA: CPT

## 2022-09-16 PROCEDURE — 1126F AMNT PAIN NOTED NONE PRSNT: CPT | Performed by: INTERNAL MEDICINE

## 2022-09-16 PROCEDURE — 3074F SYST BP LT 130 MM HG: CPT | Performed by: INTERNAL MEDICINE

## 2022-09-16 RX ORDER — POLYETHYLENE GLYCOL 3350, SODIUM SULFATE ANHYDROUS, SODIUM BICARBONATE, SODIUM CHLORIDE, POTASSIUM CHLORIDE 236; 22.74; 6.74; 5.86; 2.97 G/4L; G/4L; G/4L; G/4L; G/4L
4 POWDER, FOR SOLUTION ORAL ONCE
Qty: 1 EACH | Refills: 0 | Status: SHIPPED | OUTPATIENT
Start: 2022-09-16 | End: 2022-09-16

## 2022-09-17 LAB — HAV IGM SER QL: NONREACTIVE

## 2022-09-22 ENCOUNTER — TELEPHONE (OUTPATIENT)
Dept: FAMILY MEDICINE CLINIC | Facility: CLINIC | Age: 67
End: 2022-09-22

## 2022-09-22 NOTE — TELEPHONE ENCOUNTER
Angeline flores RN with [facility] is requesting a call back to confirm if patient is on a statin.

## 2022-10-12 ENCOUNTER — TELEPHONE (OUTPATIENT)
Dept: GASTROENTEROLOGY | Facility: CLINIC | Age: 67
End: 2022-10-12

## 2022-10-12 NOTE — TELEPHONE ENCOUNTER
----- Message from Alfonso Parrish MD sent at 10/8/2022  5:36 PM CDT -----  GI RNs-Please call Ms. Alexandr Reza to advise that the blood tests of 9/16/2022 to screen for liver diseases came back normal/reassuring. She tested negative for Hepatitis B and C.   Her mildly elevated liver enzymes are likely due to fatty liver disease.    - cb

## 2022-10-12 NOTE — TELEPHONE ENCOUNTER
used #[id number]    Contacted patient and verified . Patient requested . I reviewed result note below with patient which she verbalized understanding of. All questions answered. Appreciative of call.

## 2022-10-29 ENCOUNTER — TELEPHONE (OUTPATIENT)
Facility: CLINIC | Age: 67
End: 2022-10-29

## 2022-10-29 NOTE — TELEPHONE ENCOUNTER
1st ,overdue reminder letter sent out to patient   Imaging order:  US LIVER (CPT=76705) (Order #[id number]) on 9/16/22

## 2022-11-05 DIAGNOSIS — M19.049 HAND ARTHRITIS: ICD-10-CM

## 2022-11-05 DIAGNOSIS — B35.1 ONYCHOMYCOSIS: ICD-10-CM

## 2022-11-07 NOTE — TELEPHONE ENCOUNTER
Please Review. Protocol Failed or has no protocol. Per last visit October 2022:   \"Takes Norco sparingly for severe pain. Has been prescribed duloxetine in the past but uncertain whether she ever picked it up or if she discontinued it due to some adverse side effects. \"  Uncertain if Duloxetine should be continued . Routing to PCP for clarification.      Requested Prescriptions   Pending Prescriptions Disp Refills    DULOXETINE 30 MG Oral Cap DR Particles [Pharmacy Med Name: DULOXETINE DR 30MG CAPSULES] 90 capsule 0     Sig: TAKE 1 CAPSULE(30 MG) BY MOUTH DAILY       Psychiatric Non-Scheduled (Anti-Anxiety) Passed - 11/5/2022  5:48 AM        Passed - In person appointment or virtual visit in the past 6 mos or appointment in next 3 mos     Recent Outpatient Visits              1 month ago [facility] [address], [place] [address], [place] Dye MD    Office Visit    1 month ago Chronic idiopathic constipation    [address], Jillene Najjar, Adrien Dowell MD    Office Visit    2 months ago Type 2 diabetes mellitus without complication, without long-term current use of insulin [facility], [address], Charlotte Dye MD    Office Visit    5 months ago Acute vaginitis    [address] [place] Peterson MD    Office Visit    8 months ago Essential hypertension    [address] [place] Peterson MD    Office Visit          Future Appointments         Provider Department Appt Notes    In 3 months [address], PROCEDURE [address] GI PROCEDURE Colon scrn with Mac @[facility]                 TERBINAFINE 250 MG Oral Tab [Pharmacy Med Name: TERBINAFINE 250MG TABLETS] 90 tablet 0     Sig: TAKE 1 TABLET(250 MG) BY MOUTH DAILY       There is no refill protocol information for this order        Recent Outpatient Visits              1 month ago Aure Duong MD    Office Visit    1 month ago Chronic idiopathic constipation    Yahir Angelo MD    Office Visit    2 months ago Type 2 diabetes mellitus without complication, without long-term current use of insulin [facility])    [facility] Gilles, Charmayne Gloss, MD    Office Visit    5 months ago Acute vaginitis    [facility] G[facility], Charmayne Gloss, MD    Office Visit    8 months ago Essential hypertension    [facility], Charmayne Gloss, MD    Office Visit          Future Appointments         Provider Department Appt Notes    In 3 months [address], [place] [address] GI PROCEDURE Colon scrn with Mac @[facility]

## 2022-11-08 DIAGNOSIS — M19.049 HAND ARTHRITIS: ICD-10-CM

## 2022-11-08 NOTE — TELEPHONE ENCOUNTER
Refill passed per [facility] protocol. Med passed protocol, has high interaction, cannot proceed from here    Requested Prescriptions   Pending Prescriptions Disp Refills    DULoxetine 30 MG Oral Cap DR Particles 90 capsule 0     Sig: Take 1 capsule (30 mg total) by mouth in the morning.        Psychiatric Non-Scheduled (Anti-Anxiety) Passed - 11/8/2022  4:11 PM        Passed - In person appointment or virtual visit in the past 6 mos or appointment in next 3 mos     Recent Outpatient Visits              1 month ago [place] Guzmán, Misha England MD    Office Visit    1 month ago Chronic idiopathic constipation    [place] Began, Jody Mitchell MD    Office Visit    2 months ago Type 2 diabetes mellitus without complication, without long-term current use of insulin [facility])    [employer] [facility], [place], Misha England MD    Office Visit    5 months ago Acute vaginitis    [place] Began, Misha England MD    Office Visit    8 months ago Essential hypertension    [place] Began, Misha England MD    Office Visit          Future Appointments         Provider Department Appt Notes    In 3 months [place], [place] GI PROCEDURE Colon scrn with Mac @[facility]                  Recent Outpatient Visits              1 month ago [place], Misha England MD    Office Visit    1 month ago Chronic idiopathic constipation    [place] Began, Joyd Mitchell MD    Office Visit    2 months ago Type 2 diabetes mellitus without complication, without long-term current use of insulin [facility])    [facility], [place], Misha England MD    Office Visit    5 months ago Acute vaginitis    [place] Began, Misha England MD    Office Visit    8 months ago Essential hypertension    [place] [facility] [place], Alison Parkinson MD    Office Visit          Future Appointments         Provider Department Appt Notes    In 3 months [address]  [address] GI PROCEDURE Colon scrn with Mac @[facility]

## 2022-11-09 RX ORDER — DULOXETIN HYDROCHLORIDE 30 MG/1
30 CAPSULE, DELAYED RELEASE ORAL DAILY
Qty: 90 CAPSULE | Refills: 1 | Status: SHIPPED | OUTPATIENT
Start: 2022-11-09

## 2022-11-10 NOTE — TELEPHONE ENCOUNTER
Unclear if pt or pharmacy is requesting the refill, if she is taking the medication I will refill, pls double check with pt. Thanks. Would hold off on terbinafine until seen in office.

## 2022-11-14 ENCOUNTER — NURSE TRIAGE (OUTPATIENT)
Dept: FAMILY MEDICINE CLINIC | Facility: CLINIC | Age: 67
End: 2022-11-14

## 2022-11-14 ENCOUNTER — OFFICE VISIT (OUTPATIENT)
Dept: FAMILY MEDICINE CLINIC | Facility: CLINIC | Age: 67
End: 2022-11-14
Payer: COMMERCIAL

## 2022-11-14 VITALS
SYSTOLIC BLOOD PRESSURE: 119 MMHG | BODY MASS INDEX: 33.42 KG/M2 | HEART RATE: 69 BPM | DIASTOLIC BLOOD PRESSURE: 79 MMHG | HEIGHT: 61 IN | TEMPERATURE: 97 F | WEIGHT: 177 LBS

## 2022-11-14 DIAGNOSIS — R14.0 ABDOMINAL BLOATING: Primary | ICD-10-CM

## 2022-11-14 DIAGNOSIS — R10.30 ABDOMINAL PAIN, LOWER: ICD-10-CM

## 2022-11-14 PROCEDURE — 3078F DIAST BP <80 MM HG: CPT | Performed by: FAMILY MEDICINE

## 2022-11-14 PROCEDURE — 3074F SYST BP LT 130 MM HG: CPT | Performed by: FAMILY MEDICINE

## 2022-11-14 PROCEDURE — 99214 OFFICE O/P EST MOD 30 MIN: CPT | Performed by: FAMILY MEDICINE

## 2022-11-14 PROCEDURE — 3008F BODY MASS INDEX DOCD: CPT | Performed by: FAMILY MEDICINE

## 2022-11-14 RX ORDER — DULOXETIN HYDROCHLORIDE 30 MG/1
CAPSULE, DELAYED RELEASE ORAL
Qty: 90 CAPSULE | Refills: 0 | OUTPATIENT
Start: 2022-11-14

## 2022-11-14 RX ORDER — DICYCLOMINE HYDROCHLORIDE 10 MG/1
10 CAPSULE ORAL 4 TIMES DAILY
Qty: 40 CAPSULE | Refills: 0 | Status: SHIPPED | OUTPATIENT
Start: 2022-11-14 | End: 2022-11-24

## 2022-11-14 RX ORDER — APREMILAST 30 MG/1
TABLET, FILM COATED ORAL
COMMUNITY
Start: 2022-11-02

## 2022-11-14 RX ORDER — KETOCONAZOLE 20 MG/ML
SHAMPOO TOPICAL
COMMUNITY
Start: 2022-10-20

## 2022-11-14 NOTE — TELEPHONE ENCOUNTER
Per patient, she is not taking the sertraline 50 mg. She has some terbinafine left. She stated, \"I take baclofen for sleep and relaxation but only sometimes. \"  She saw Dr. Guillermina Maddox in the office today but nothing was discussed regarding any medications. Dr. Ana Dumont, she has no upcoming appts with you to [facility] for the terbinafine. \" Per your note below. Did you want to order?       pended

## 2022-11-15 RX ORDER — TERBINAFINE HYDROCHLORIDE 250 MG/1
TABLET ORAL
Qty: 90 TABLET | Refills: 0 | Status: SHIPPED | OUTPATIENT
Start: 2022-11-15

## 2022-11-28 ENCOUNTER — TELEPHONE (OUTPATIENT)
Dept: FAMILY MEDICINE CLINIC | Facility: CLINIC | Age: 67
End: 2022-11-28

## 2022-11-28 ENCOUNTER — HOSPITAL ENCOUNTER (OUTPATIENT)
Dept: ULTRASOUND IMAGING | Age: 67
Discharge: HOME | End: 2022-11-28
Attending: INTERNAL MEDICINE
Payer: MEDICARE

## 2022-11-28 DIAGNOSIS — R74.8 ABNORMAL TRANSAMINASES: ICD-10-CM

## 2022-11-28 PROCEDURE — 76705 ECHO EXAM OF ABDOMEN: CPT | Performed by: INTERNAL MEDICINE

## 2022-11-28 NOTE — TELEPHONE ENCOUNTER
Patient in office stating last prescription for Lisinopril has not bee as effective as previous one. Patient states Rx is same dose but different pill. Recalls pill being pink and round and Rx she picked up is now larger tab and is red. Patient requesting last prescription. Advised patient to speak with pharmacy.  Routing as [place]

## 2022-12-02 ENCOUNTER — TELEPHONE (OUTPATIENT)
Dept: FAMILY MEDICINE CLINIC | Facility: CLINIC | Age: 67
End: 2022-12-02

## 2022-12-02 DIAGNOSIS — E78.2 MIXED HYPERLIPIDEMIA: ICD-10-CM

## 2022-12-02 NOTE — TELEPHONE ENCOUNTER
Unable to reach patient to discuss statin use in diabetes via  ID# [id number]. Unable to leave VM; mailbox full.

## 2022-12-05 RX ORDER — PRAVASTATIN SODIUM 10 MG
10 TABLET ORAL NIGHTLY
Qty: 90 TABLET | Refills: 1 | Status: SHIPPED | OUTPATIENT
Start: 2022-12-05

## 2022-12-05 NOTE — TELEPHONE ENCOUNTER
Thank you, rx sent. Mixed hyperlipidemia  - pravastatin 10 MG Oral Tab; Take 1 tablet (10 mg total) by mouth nightly. Dispense: 90 tablet;  Refill: 1

## 2022-12-05 NOTE — TELEPHONE ENCOUNTER
Reached patient to discuss statin use in diabetes via  ID# [id number]. Discussed guidelines and recommendation to be on statin. Patient has previously been prescribed pravastatin but has not been taking the medication. Patient states that she has been on so many medications and was confused. Patient requests that I send a refill for pravastatin to her preferred pharmacy. Will pend order for PCP to review. Patient concerned with her lisinopril tablets looking different than usual. Assured patient that they are the same medication and will work in the same way. Provided education on indication and purpose of statin in diabetes. Reinforced the importance of adherence.  Patient denies any further questions or concerns

## 2022-12-16 ENCOUNTER — HOSPITAL ENCOUNTER (OUTPATIENT)
Age: 67
Discharge: HOME | End: 2022-12-16
Payer: MEDICARE

## 2022-12-16 VITALS
SYSTOLIC BLOOD PRESSURE: 122 MMHG | HEART RATE: 81 BPM | OXYGEN SATURATION: 97 % | RESPIRATION RATE: 18 BRPM | DIASTOLIC BLOOD PRESSURE: 75 MMHG | TEMPERATURE: 97 F

## 2022-12-16 DIAGNOSIS — J06.9 VIRAL UPPER RESPIRATORY TRACT INFECTION: Primary | ICD-10-CM

## 2022-12-16 DIAGNOSIS — R05.9 COUGH: ICD-10-CM

## 2022-12-16 DIAGNOSIS — Z20.822 ENCOUNTER FOR LABORATORY TESTING FOR COVID-19 VIRUS: ICD-10-CM

## 2022-12-16 LAB
POCT INFLUENZA A: NEGATIVE
POCT INFLUENZA B: NEGATIVE
SARS-COV-2 RNA RESP QL NAA+PROBE: NOT DETECTED

## 2022-12-16 RX ORDER — BENZONATATE 100 MG/1
100 CAPSULE ORAL 3 TIMES DAILY PRN
Qty: 30 CAPSULE | Refills: 0 | Status: SHIPPED | OUTPATIENT
Start: 2022-12-16 | End: 2023-01-15

## 2022-12-16 NOTE — DISCHARGE INSTRUCTIONS
Influenza test is negative. COVID test is negative. There are no signs of infection on physical exam.  This is likely a viral illness. Use the Tessalon Perles for the cough, please remember these might make you drowsy so do not drive or drink alcohol when you take them. Please be sure to drink plenty of fluids, use Tylenol and Motrin for pain or fever. Use Flonase and Mucinex for congestion. If you develop any respiratory complaints, fever that does not improve with medications or any other concerning complaints you should go to the emergency department. Otherwise follow up with your primary care provider.

## 2023-01-03 ENCOUNTER — TELEPHONE (OUTPATIENT)
Dept: FAMILY MEDICINE CLINIC | Facility: CLINIC | Age: 68
End: 2023-01-03

## 2023-01-03 NOTE — TELEPHONE ENCOUNTER
Patient is eligible for a 2023 Medicare Annual Wellness visit. This visit can be scheduled any time in the calendar year. Discussed in detail w/patient. Appt scheduled for 1/30/23.

## 2023-01-28 DIAGNOSIS — E07.9 THYROID DISEASE: Chronic | ICD-10-CM

## 2023-01-28 DIAGNOSIS — B35.1 ONYCHOMYCOSIS: ICD-10-CM

## 2023-01-28 DIAGNOSIS — E11.9 TYPE 2 DIABETES MELLITUS WITHOUT COMPLICATION, WITHOUT LONG-TERM CURRENT USE OF INSULIN (HCC): ICD-10-CM

## 2023-01-30 DIAGNOSIS — E11.9 TYPE 2 DIABETES MELLITUS WITHOUT COMPLICATION, WITHOUT LONG-TERM CURRENT USE OF INSULIN (HCC): ICD-10-CM

## 2023-01-30 RX ORDER — SITAGLIPTIN 100 MG/1
100 TABLET, FILM COATED ORAL DAILY
Qty: 90 TABLET | Refills: 1 | OUTPATIENT
Start: 2023-01-30

## 2023-01-30 RX ORDER — DAPAGLIFLOZIN 10 MG/1
TABLET, FILM COATED ORAL
Qty: 90 TABLET | Refills: 1 | Status: SHIPPED | OUTPATIENT
Start: 2023-01-30

## 2023-01-30 RX ORDER — SITAGLIPTIN 100 MG/1
TABLET, FILM COATED ORAL
Qty: 90 TABLET | Refills: 1 | Status: SHIPPED | OUTPATIENT
Start: 2023-01-30

## 2023-01-30 NOTE — TELEPHONE ENCOUNTER
Refill passed per [place] [employer], [facility] protocol.   Requested Prescriptions   Pending Prescriptions Disp Refills    FARXIGA 10 MG Oral Tab [Pharmacy Med Name: Jaleel Mayorga 10MG TABLETS] 90 tablet 1     Sig: TAKE 1 TABLET(10 MG) BY MOUTH DAILY       Diabetes Medication Protocol Passed - 1/28/2023  5:50 AM        Passed - Last A1C < 7.5 and within past 6 months     Lab Results   Component Value Date    A1C 6.0 (H) 08/22/2022             Passed - In person appointment or virtual visit in the past 6 mos or appointment in next 3 mos     Recent Outpatient Visits              2 months ago Abdominal bloating    [address], [address], [address], [place],     Office Visit    3 months ago Enzo Leyva MD    Office Visit    4 months ago Chronic idiopathic constipation    Dejuan Almanza MD    Office Visit    5 months ago Type 2 diabetes mellitus without complication, without long-term current use of insulin [facility])    Enzo Almanza MD    Office Visit    7 months ago Acute vaginitis    Enzo Almanza MD    Office Visit          Future Appointments         Provider Department Appt Notes    In 2 weeks [address] [facility], [address], [place] Colon scrn with Mac @[facility]    In 3 weeks MD Eleuterio Potts Addison                Passed - [facility] or GFRNAA > 50     GFR Evaluation  EGFRCR: 100 , resulted on 8/22/2022          Passed - GFR in the past 12 months          JANUVIA 100 MG Oral Tab [Pharmacy Med Name: JANUVIA 100MG TABLETS] 90 tablet 1     Sig: TAKE 1 TABLET(100 MG) BY MOUTH DAILY       Diabetes Medication Protocol Passed - 1/28/2023  5:50 AM        Passed - Last A1C < 7.5 and within past 6 months     Lab Results   Component Value Date    A1C 6.0 (H) 08/22/2022             Passed - In person appointment or virtual visit in the past 6 mos or appointment in next 3 mos     Recent Outpatient Visits              2 months ago Abdominal bloating    [address], [address], [place] Kya    Office Visit    3 months ago Misha Puri MD    Office Visit    4 months ago Chronic idiopathic constipation    [address] [address] MD    Office Visit    5 months ago Type 2 diabetes mellitus without complication, without long-term current use of insulin [facility])    [address] Misha Salcedo MD    Office Visit    7 months ago Acute vaginitis    [address] Misha Salcedo MD    Office Visit          Future Appointments         Provider Department Appt Notes    In 2 weeks Kamryn [place], [address], [place] Colon scrn with Mac @[facility]    In 3 weeks Praful De Los Santos [place] [address] [facility] [place] [place]                Passed - [facility] or GFRNAA > 50     GFR Evaluation  EGFRCR: 100 , resulted on 8/22/2022          Passed - GFR in the past 12 months          LEVOTHYROXINE 25 MCG Oral Tab [Pharmacy Med Name: LEVOTHYROXINE 0.025MG (25MCG) TAB] 90 tablet 0     Sig: TAKE 1 TABLET(25 MCG) BY MOUTH BEFORE BREAKFAST       Thyroid Medication Protocol Passed - 1/28/2023  5:50 AM        Passed - TSH in past 12 months        Passed - Last TSH value is normal     Lab Results   Component Value Date    TSH 2.110 03/01/2022                 Passed - In person appointment or virtual visit in the past 12 mos or appointment in next 3 mos     Recent Outpatient Visits              2 months ago Abdominal bloating    [address], [address], [place] DO    Office Visit    3 months ago Mookie Booker MD    Office Visit    4 months ago Chronic idiopathic constipation    [address] [facility], Conchita Ganser, MD    Office Visit    5 months ago Type 2 diabetes mellitus without complication, without long-term current use of insulin [facility])    [address], Robin Gutierrez MD    Office Visit    7 months ago Acute vaginitis    [address] [facility] Robin [facility]    Office Visit          Future Appointments         Provider Department Appt Notes    In 2 weeks Hudson Mccullough, [address], [place] Colon scrn with Mac @[facility]    In 3 weeks Elias Miranda MD [address] [facility], [place]                  TERBINAFINE 250 MG Oral Tab [Pharmacy Med Name: TERBINAFINE 250MG TABLETS] 90 tablet 0     Sig: TAKE 1 TABLET(250 MG) BY MOUTH DAILY       There is no refill protocol information for this order        Recent Outpatient Visits              2 months ago Abdominal bloating    [facility], [address], [address], [place],     Office Visit    3 months ago Robin Booker MD    Office Visit    4 months ago Chronic idiopathic constipation    [address] [facility], Conchita Ganser, MD    Office Visit    5 months ago Type 2 diabetes mellitus without complication, without long-term current use of insulin [facility])    [address], Robin Gutierrez MD    Office Visit    7 months ago Acute vaginitis    [address] [facility]    Office Visit          Future Appointments         Provider Department Appt Notes    In 2 weeks Kang [facility], [address], [place] Colon scrn with Mac @[facility]    In 3 weeks Gary Akers MD [address] [facility], [place]

## 2023-01-30 NOTE — TELEPHONE ENCOUNTER
Protocol failed or has No Protocol, please review    Note;  RX for Levothyroxine  in 2022   Requested Prescriptions   Pending Prescriptions Disp Refills    LEVOTHYROXINE 25 MCG Oral Tab [Pharmacy Med Name: LEVOTHYROXINE 0.025MG (25MCG) TAB] 90 tablet 0     Sig: TAKE 1 TABLET(25 MCG) BY MOUTH BEFORE BREAKFAST       Thyroid Medication Protocol Passed - 2023  5:50 AM        Passed - TSH in past 12 months        Passed - Last TSH value is normal     Lab Results   Component Value Date    TSH 2.110 2022                 Passed - In person appointment or virtual visit in the past 12 mos or appointment in next 3 mos     Recent Outpatient Visits              2 months ago Abdominal bloating    [address] [place], [place] [address]M[place] DO    Office Visit    3 months ago Joan Lozada MD    Office Visit    4 months ago Chronic idiopathic constipation    [address] [place], [place] [address]Tomás MD    Office Visit    5 months ago Type 2 diabetes mellitus without complication, without long-term current use of insulin ([facility] [address]    [address] [place] [address] Joan Sanders MD    Office Visit    7 months ago Acute vaginitis    [address] [place] [address] Joan Sanders MD    Office Visit          Future Appointments         Provider Department Appt Notes    In 2 weeks [employer], [address], [place] Colon scrn with [facility] @[facility]    In 3 weeks Tai Gore MD [address] [place], [place] [address]M[place]                  TERBINAFINE 250 MG Oral Tab [Pharmacy Med Name: TERBINAFINE 250MG TABLETS] 90 tablet 0     Sig: TAKE 1 TABLET(250 MG) BY MOUTH DAILY       There is no refill protocol information for this order      Signed Prescriptions Disp Refills    FARXIGA 10 MG Oral Tab 90 tablet 1 Sig: TAKE 1 TABLET(10 MG) BY MOUTH DAILY       Diabetes Medication Protocol Passed - 1/28/2023  5:50 AM        Passed - Last A1C < 7.5 and within past 6 months     Lab Results   Component Value Date    A1C 6.0 (H) 08/22/2022             Passed - In person appointment or virtual visit in the past 6 mos or appointment in next 3 mos     Recent Outpatient Visits              2 months ago Abdominal bloating    [facility], [address], [address], [place], DO    Office Visit    3 months ago Jaleesa Vidal MD    Office Visit    4 months ago Chronic idiopathic constipation    [address] [facility], Trace Albert MD    Office Visit    5 months ago Type 2 diabetes mellitus without complication, without long-term current use of insulin ([facility]    [address], [address], Jaleesa Guillen MD    Office Visit    7 months ago Acute vaginitis    [address] [facility], Jaleesa Guillen MD    Office Visit          Future Appointments         Provider Department Appt Notes    In 2 weeks [address] [place] [employer] [facility], [address], [place] Colon scrn with Mac @[facility]    In 3 weeks Darius Hastings MD [address] [facility], [place]                Passed - [place] or GFRNAA > 50     GFR Evaluation  EGFRCR: 100 , resulted on 8/22/2022          Passed - GFR in the past 12 months          JANUVIA 100 MG Oral Tab 90 tablet 1     Sig: TAKE 1 TABLET(100 MG) BY MOUTH DAILY       Diabetes Medication Protocol Passed - 1/28/2023  5:50 AM        Passed - Last A1C < 7.5 and within past 6 months     Lab Results   Component Value Date    A1C 6.0 (H) 08/22/2022             Passed - In person appointment or virtual visit in the past 6 mos or appointment in next 3 mos     Recent Outpatient Visits              2 months ago Abdominal bloating    [facility], [address], [place] Winn, DO    Office Visit    3 months ago Ilda Dutta MD    Office Visit    4 months ago Chronic idiopathic constipation    Daryasmitha Hennessy, [address], Meño Armstrong MD    Office Visit    5 months ago Type 2 diabetes mellitus without complication, without long-term current use of insulin [facility])    [address] [facility]    Office Visit    7 months ago Acute vaginitis    [address] [place], [place] [address]Ilda MD    Office Visit          Future Appointments         Provider Department Appt Notes    In 2 weeks [address] [employer], [address], [place] Colon scrn with Mac @[facility]    In 3 weeks Inocencio Flores MD [address] [place] [address], [address] or GFRNAA > 50     GFR Evaluation  EGFRCR: 100 , resulted on 8/22/2022          Passed - GFR in the past 12 months           Future Appointments         Provider Department Appt Notes    In 2 weeks [address] [employer], [address], [place] Colon scrn with Mac @[facility]    In 3 weeks Inocencio Flores MD [address] [place] [address]           Recent Outpatient Visits              2 months ago Abdominal bloating    [address] [place] [address], [address], [place], DO    Office Visit    3 months ago Ilda Dutta MD    Office Visit    4 months ago Chronic idiopathic constipation    [address] [place], [place] [address]Meño MD    Office Visit    5 months ago Type 2 diabetes mellitus without complication, without long-term current use of insulin ([facility] [address]    [address] [facility] Angel Delgado MD    Office Visit    7 months ago Acute vaginitis    Jaleesa Willingham MD    Office Visit

## 2023-01-31 RX ORDER — TERBINAFINE HYDROCHLORIDE 250 MG/1
TABLET ORAL
Qty: 90 TABLET | Refills: 0 | Status: SHIPPED | OUTPATIENT
Start: 2023-01-31

## 2023-01-31 RX ORDER — LEVOTHYROXINE SODIUM 0.03 MG/1
TABLET ORAL
Qty: 90 TABLET | Refills: 0 | Status: SHIPPED | OUTPATIENT
Start: 2023-01-31

## 2023-02-02 ENCOUNTER — APPOINTMENT (OUTPATIENT)
Dept: GENERAL RADIOLOGY | Age: 68
End: 2023-02-02
Attending: NURSE PRACTITIONER
Payer: MEDICARE

## 2023-02-02 ENCOUNTER — HOSPITAL ENCOUNTER (OUTPATIENT)
Age: 68
Discharge: HOME | End: 2023-02-02
Payer: MEDICARE

## 2023-02-02 VITALS
DIASTOLIC BLOOD PRESSURE: 62 MMHG | RESPIRATION RATE: 18 BRPM | SYSTOLIC BLOOD PRESSURE: 102 MMHG | HEART RATE: 87 BPM | TEMPERATURE: 97 F | OXYGEN SATURATION: 97 %
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
## 2023-02-02 DIAGNOSIS — S32.10XA CLOSED FRACTURE OF SACRUM, UNSPECIFIED PORTION OF SACRUM, INITIAL ENCOUNTER (HCC): Primary | ICD-10-CM

## 2023-02-02 PROCEDURE — 72220 X-RAY EXAM SACRUM TAILBONE: CPT | Performed by: NURSE PRACTITIONER

## 2023-02-02 PROCEDURE — 99213 OFFICE O/P EST LOW 20 MIN: CPT | Performed by: NURSE PRACTITIONER

## 2023-02-02 PROCEDURE — 90471 IMMUNIZATION ADMIN: CPT | Performed by: NURSE PRACTITIONER

## 2023-02-02 PROCEDURE — 90715 TDAP VACCINE 7 YRS/> IM: CPT | Performed by: NURSE PRACTITIONER

## 2023-02-02 NOTE — ED INITIAL ASSESSMENT (HPI)
Patient presents s/p fall from 4 days ago where she hit her head on the ground while taking out the garbage. Area to the back of the head noted to be open without acute bleeding or drainage. Denies tenderness headache neck pain vision changes nausea/vomting weakness.

## 2023-02-02 NOTE — DISCHARGE INSTRUCTIONS
Please take Tylenol as needed for pain.   Close follow-up with your primary care provider is recommended

## 2023-02-03 ENCOUNTER — TELEPHONE (OUTPATIENT)
Dept: FAMILY MEDICINE CLINIC | Facility: CLINIC | Age: 68
End: 2023-02-03

## 2023-02-03 NOTE — TELEPHONE ENCOUNTER
With [employer]  [employer]mandak[employer] ID# [id number]    Patient was seen in urgent care 02/02/23 for fall. She is requesting appointment for follow up.     Appointment scheduled:    Future Appointments   Date Time Provider Mary Kate Batista   2/6/2023  8:00 AM [facility]   2/16/2023  8:30 AM STEPHEN, PROCEDURE [address] None   2/21/2023  1:00 PM [facility]

## 2023-02-06 ENCOUNTER — TELEPHONE (OUTPATIENT)
Dept: FAMILY MEDICINE CLINIC | Facility: CLINIC | Age: 68
End: 2023-02-06

## 2023-02-06 ENCOUNTER — OFFICE VISIT (OUTPATIENT)
Dept: FAMILY MEDICINE CLINIC | Facility: CLINIC | Age: 68
End: 2023-02-06

## 2023-02-06 ENCOUNTER — LAB ENCOUNTER (OUTPATIENT)
Dept: LAB | Age: 68
End: 2023-02-06
Attending: FAMILY MEDICINE
Payer: MEDICARE

## 2023-02-06 VITALS
HEIGHT: 61 IN | TEMPERATURE: 98 F | WEIGHT: 166 LBS | SYSTOLIC BLOOD PRESSURE: 98 MMHG | HEART RATE: 73 BPM | BODY MASS INDEX: 31.34 KG/M2 | DIASTOLIC BLOOD PRESSURE: 66 MMHG

## 2023-02-06 DIAGNOSIS — R30.0 DYSURIA: ICD-10-CM

## 2023-02-06 DIAGNOSIS — M54.50 ACUTE BILATERAL LOW BACK PAIN WITHOUT SCIATICA: Primary | ICD-10-CM

## 2023-02-06 DIAGNOSIS — Z00.00 ENCOUNTER FOR ANNUAL HEALTH EXAMINATION: Primary | ICD-10-CM

## 2023-02-06 DIAGNOSIS — N30.00 ACUTE CYSTITIS WITHOUT HEMATURIA: ICD-10-CM

## 2023-02-06 DIAGNOSIS — Z00.00 ENCOUNTER FOR ANNUAL HEALTH EXAMINATION: ICD-10-CM

## 2023-02-06 DIAGNOSIS — R30.0 DYSURIA: Primary | ICD-10-CM

## 2023-02-06 DIAGNOSIS — M19.90 ARTHRITIS: ICD-10-CM

## 2023-02-06 DIAGNOSIS — M54.50 ACUTE BILATERAL LOW BACK PAIN WITHOUT SCIATICA: ICD-10-CM

## 2023-02-06 LAB
ALBUMIN SERPL-MCNC: 4.2 G/DL (ref 3.4–5)
ALBUMIN/GLOB SERPL: 1.1 {RATIO} (ref 1–2)
ALP LIVER SERPL-CCNC: 79 U/L
ALT SERPL-CCNC: 28 U/L
ANION GAP SERPL CALC-SCNC: 9 MMOL/L (ref 0–18)
APPEARANCE: CLEAR
AST SERPL-CCNC: 23 U/L (ref 15–37)
BASOPHILS # BLD AUTO: 0.04 X10(3) UL (ref 0–0.2)
BASOPHILS NFR BLD AUTO: 0.6 %
BILIRUB SERPL-MCNC: 0.4 MG/DL (ref 0.1–2)
BILIRUBIN: NEGATIVE
BUN BLD-MCNC: 20 MG/DL (ref 7–18)
BUN/CREAT SERPL: 27.4 (ref 10–20)
CALCIUM BLD-MCNC: 10.2 MG/DL (ref 8.5–10.1)
CHLORIDE SERPL-SCNC: 106 MMOL/L (ref 98–112)
CHOLEST SERPL-MCNC: 143 MG/DL (ref ?–200)
CO2 SERPL-SCNC: 24 MMOL/L (ref 21–32)
CREAT BLD-MCNC: 0.73 MG/DL
DEPRECATED RDW RBC AUTO: 46.1 FL (ref 35.1–46.3)
EOSINOPHIL # BLD AUTO: 0.07 X10(3) UL (ref 0–0.7)
EOSINOPHIL NFR BLD AUTO: 1 %
ERYTHROCYTE [DISTWIDTH] IN BLOOD BY AUTOMATED COUNT: 13 % (ref 11–15)
EST. AVERAGE GLUCOSE BLD GHB EST-MCNC: 131 MG/DL (ref 68–126)
FASTING PATIENT LIPID ANSWER: YES
FASTING STATUS PATIENT QL REPORTED: YES
GFR SERPLBLD BASED ON 1.73 SQ M-ARVRAT: 90 ML/MIN/1.73M2 (ref 60–?)
GLOBULIN PLAS-MCNC: 3.9 G/DL (ref 2.8–4.4)
GLUCOSE (URINE DIPSTICK): 500 MG/DL
GLUCOSE BLD-MCNC: 105 MG/DL (ref 70–99)
HBA1C MFR BLD: 6.2 % (ref ?–5.7)
HCT VFR BLD AUTO: 43.4 %
HDLC SERPL-MCNC: 40 MG/DL (ref 40–59)
HGB BLD-MCNC: 13.9 G/DL
IMM GRANULOCYTES # BLD AUTO: 0.05 X10(3) UL (ref 0–1)
IMM GRANULOCYTES NFR BLD: 0.7 %
KETONES (URINE DIPSTICK): NEGATIVE MG/DL
LDLC SERPL CALC-MCNC: 76 MG/DL (ref ?–100)
LYMPHOCYTES # BLD AUTO: 1.71 X10(3) UL (ref 1–4)
LYMPHOCYTES NFR BLD AUTO: 23.8 %
MCH RBC QN AUTO: 30.6 PG (ref 26–34)
MCHC RBC AUTO-ENTMCNC: 32 G/DL (ref 31–37)
MCV RBC AUTO: 95.6 FL
MONOCYTES # BLD AUTO: 0.46 X10(3) UL (ref 0.1–1)
MONOCYTES NFR BLD AUTO: 6.4 %
MULTISTIX LOT#: ABNORMAL NUMERIC
NEUTROPHILS # BLD AUTO: 4.87 X10 (3) UL (ref 1.5–7.7)
NEUTROPHILS # BLD AUTO: 4.87 X10(3) UL (ref 1.5–7.7)
NEUTROPHILS NFR BLD AUTO: 67.5 %
NITRITE, URINE: NEGATIVE
NONHDLC SERPL-MCNC: 103 MG/DL (ref ?–130)
OSMOLALITY SERPL CALC.SUM OF ELEC: 291 MOSM/KG (ref 275–295)
PH, URINE: 5 (ref 4.5–8)
PLATELET # BLD AUTO: 296 10(3)UL (ref 150–450)
POTASSIUM SERPL-SCNC: 4 MMOL/L (ref 3.5–5.1)
PROT SERPL-MCNC: 8.1 G/DL (ref 6.4–8.2)
PROTEIN (URINE DIPSTICK): NEGATIVE MG/DL
RBC # BLD AUTO: 4.54 X10(6)UL
SODIUM SERPL-SCNC: 139 MMOL/L (ref 136–145)
SPECIFIC GRAVITY: 1.02 (ref 1–1.03)
TRIGL SERPL-MCNC: 159 MG/DL (ref 30–149)
TSI SER-ACNC: 1.71 MIU/ML (ref 0.36–3.74)
URINE-COLOR: YELLOW
UROBILINOGEN,SEMI-QN: 0.2 MG/DL (ref 0–1.9)
VLDLC SERPL CALC-MCNC: 25 MG/DL (ref 0–30)
WBC # BLD AUTO: 7.2 X10(3) UL (ref 4–11)

## 2023-02-06 PROCEDURE — 3078F DIAST BP <80 MM HG: CPT | Performed by: FAMILY MEDICINE

## 2023-02-06 PROCEDURE — 87086 URINE CULTURE/COLONY COUNT: CPT

## 2023-02-06 PROCEDURE — 87186 SC STD MICRODIL/AGAR DIL: CPT

## 2023-02-06 PROCEDURE — 81003 URINALYSIS AUTO W/O SCOPE: CPT | Performed by: FAMILY MEDICINE

## 2023-02-06 PROCEDURE — 99214 OFFICE O/P EST MOD 30 MIN: CPT | Performed by: FAMILY MEDICINE

## 2023-02-06 PROCEDURE — 87077 CULTURE AEROBIC IDENTIFY: CPT

## 2023-02-06 PROCEDURE — 83036 HEMOGLOBIN GLYCOSYLATED A1C: CPT

## 2023-02-06 PROCEDURE — 3074F SYST BP LT 130 MM HG: CPT | Performed by: FAMILY MEDICINE

## 2023-02-06 PROCEDURE — 84443 ASSAY THYROID STIM HORMONE: CPT

## 2023-02-06 PROCEDURE — 3008F BODY MASS INDEX DOCD: CPT | Performed by: FAMILY MEDICINE

## 2023-02-06 PROCEDURE — 36415 COLL VENOUS BLD VENIPUNCTURE: CPT

## 2023-02-06 PROCEDURE — 80061 LIPID PANEL: CPT

## 2023-02-06 PROCEDURE — 1125F AMNT PAIN NOTED PAIN PRSNT: CPT | Performed by: FAMILY MEDICINE

## 2023-02-06 PROCEDURE — 80053 COMPREHEN METABOLIC PANEL: CPT

## 2023-02-06 PROCEDURE — 3044F HG A1C LEVEL LT 7.0%: CPT | Performed by: FAMILY MEDICINE

## 2023-02-06 PROCEDURE — 85025 COMPLETE CBC W/AUTO DIFF WBC: CPT

## 2023-02-06 RX ORDER — HYDROCODONE BITARTRATE AND ACETAMINOPHEN 10; 325 MG/1; MG/1
1 TABLET ORAL DAILY PRN
Qty: 30 TABLET | Refills: 0 | Status: SHIPPED | OUTPATIENT
Start: 2023-02-06

## 2023-02-06 RX ORDER — FLUOCINONIDE TOPICAL SOLUTION USP, 0.05% 0.5 MG/ML
SOLUTION TOPICAL
COMMUNITY
Start: 2022-12-27

## 2023-02-06 RX ORDER — NITROFURANTOIN 25; 75 MG/1; MG/1
100 CAPSULE ORAL 2 TIMES DAILY
Qty: 10 CAPSULE | Refills: 0 | Status: SHIPPED | OUTPATIENT
Start: 2023-02-06 | End: 2023-02-11

## 2023-02-06 NOTE — TELEPHONE ENCOUNTER
Patient was referred to orthopedics, but patient can not be seen until first seen by the pain clinic. Patient requesting referral for the pain clinic. Please call with  at [phone],[facility].

## 2023-02-06 NOTE — TELEPHONE ENCOUNTER
1. Acute bilateral low back pain without sciatica  - referral to pain clinic placed - left blank, can see first available provider.   - OP REFERRAL PAIN MANGEMENT  - norco script given today. Thank you!

## 2023-02-13 ENCOUNTER — OFFICE VISIT (OUTPATIENT)
Dept: FAMILY MEDICINE CLINIC | Facility: CLINIC | Age: 68
End: 2023-02-13

## 2023-02-13 VITALS
DIASTOLIC BLOOD PRESSURE: 74 MMHG | BODY MASS INDEX: 32 KG/M2 | HEART RATE: 75 BPM | WEIGHT: 167 LBS | SYSTOLIC BLOOD PRESSURE: 115 MMHG | TEMPERATURE: 97 F

## 2023-02-13 DIAGNOSIS — Z23 NEED FOR PNEUMOCOCCAL VACCINE: ICD-10-CM

## 2023-02-13 DIAGNOSIS — I10 ESSENTIAL HYPERTENSION: Chronic | ICD-10-CM

## 2023-02-13 DIAGNOSIS — Z23 NEED FOR SHINGLES VACCINE: ICD-10-CM

## 2023-02-13 DIAGNOSIS — D69.2 SENILE PURPURA (HCC): ICD-10-CM

## 2023-02-13 DIAGNOSIS — I83.12 VARICOSE VEINS OF BOTH LOWER EXTREMITIES WITH INFLAMMATION: ICD-10-CM

## 2023-02-13 DIAGNOSIS — E55.9 VITAMIN D DEFICIENCY: ICD-10-CM

## 2023-02-13 DIAGNOSIS — M19.049 HAND ARTHRITIS: ICD-10-CM

## 2023-02-13 DIAGNOSIS — K21.9 GASTROESOPHAGEAL REFLUX DISEASE WITHOUT ESOPHAGITIS: ICD-10-CM

## 2023-02-13 DIAGNOSIS — E78.2 MIXED HYPERLIPIDEMIA: Chronic | ICD-10-CM

## 2023-02-13 DIAGNOSIS — I83.11 VARICOSE VEINS OF BOTH LOWER EXTREMITIES WITH INFLAMMATION: ICD-10-CM

## 2023-02-13 DIAGNOSIS — J41.0 SMOKERS' COUGH (HCC): ICD-10-CM

## 2023-02-13 DIAGNOSIS — E07.9 THYROID DISEASE: Chronic | ICD-10-CM

## 2023-02-13 DIAGNOSIS — Z00.00 ENCOUNTER FOR ANNUAL HEALTH EXAMINATION: Primary | ICD-10-CM

## 2023-02-13 DIAGNOSIS — N30.00 ACUTE CYSTITIS WITHOUT HEMATURIA: ICD-10-CM

## 2023-02-13 DIAGNOSIS — E11.9 TYPE 2 DIABETES MELLITUS WITHOUT COMPLICATION, WITHOUT LONG-TERM CURRENT USE OF INSULIN (HCC): Chronic | ICD-10-CM

## 2023-02-13 PROBLEM — R79.89 ELEVATED LFTS: Status: RESOLVED | Noted: 2022-09-07 | Resolved: 2023-02-13

## 2023-02-13 PROBLEM — B35.1 ONYCHOMYCOSIS: Status: RESOLVED | Noted: 2022-08-22 | Resolved: 2023-02-13

## 2023-02-13 PROBLEM — E78.1 HYPERTRIGLYCERIDEMIA: Status: RESOLVED | Noted: 2022-09-07 | Resolved: 2023-02-13

## 2023-02-13 PROBLEM — M19.90 ARTHRITIS: Status: RESOLVED | Noted: 2020-01-24 | Resolved: 2023-02-13

## 2023-02-13 RX ORDER — ZOSTER VACCINE RECOMBINANT, ADJUVANTED 50 MCG/0.5
0.5 KIT INTRAMUSCULAR ONCE
Qty: 1 EACH | Refills: 0 | Status: SHIPPED | OUTPATIENT
Start: 2023-02-13 | End: 2023-02-13

## 2023-02-13 RX ORDER — SULFAMETHOXAZOLE AND TRIMETHOPRIM 800; 160 MG/1; MG/1
1 TABLET ORAL 2 TIMES DAILY
Qty: 6 TABLET | Refills: 0 | Status: SHIPPED | OUTPATIENT
Start: 2023-02-13 | End: 2023-02-16

## 2023-02-16 ENCOUNTER — SURGERY CENTER DOCUMENTATION (OUTPATIENT)
Dept: SURGERY | Age: 68
End: 2023-02-16

## 2023-02-23 ENCOUNTER — TELEPHONE (OUTPATIENT)
Facility: CLINIC | Age: 68
End: 2023-02-23

## 2023-02-23 ENCOUNTER — TELEPHONE (OUTPATIENT)
Dept: FAMILY MEDICINE CLINIC | Facility: CLINIC | Age: 68
End: 2023-02-23

## 2023-02-23 DIAGNOSIS — Z12.11 SCREENING FOR COLON CANCER: Primary | ICD-10-CM

## 2023-03-01 ENCOUNTER — OFFICE VISIT (OUTPATIENT)
Dept: PAIN CLINIC | Facility: HOSPITAL | Age: 68
End: 2023-03-01
Attending: FAMILY MEDICINE
Payer: MEDICARE

## 2023-03-01 VITALS
HEIGHT: 61 IN | DIASTOLIC BLOOD PRESSURE: 65 MMHG | RESPIRATION RATE: 18 BRPM | WEIGHT: 172 LBS | SYSTOLIC BLOOD PRESSURE: 106 MMHG | HEART RATE: 69 BPM | BODY MASS INDEX: 32.47 KG/M2

## 2023-03-01 DIAGNOSIS — M54.50 ACUTE BILATERAL LOW BACK PAIN WITHOUT SCIATICA: ICD-10-CM

## 2023-03-01 DIAGNOSIS — M54.50 ACUTE RIGHT-SIDED LOW BACK PAIN WITHOUT SCIATICA: Primary | ICD-10-CM

## 2023-03-01 PROBLEM — M53.3 COCCYX PAIN: Chronic | Status: ACTIVE | Noted: 2023-03-01

## 2023-03-01 PROCEDURE — 99202 OFFICE O/P NEW SF 15 MIN: CPT

## 2023-03-01 PROCEDURE — 20552 NJX 1/MLT TRIGGER POINT 1/2: CPT

## 2023-03-01 RX ORDER — BUPIVACAINE HYDROCHLORIDE 2.5 MG/ML
10 INJECTION, SOLUTION EPIDURAL; INFILTRATION; INTRACAUDAL ONCE
Status: ACTIVE | OUTPATIENT
Start: 2023-03-01

## 2023-03-01 RX ORDER — METHYLPREDNISOLONE ACETATE 40 MG/ML
40 INJECTION, SUSPENSION INTRA-ARTICULAR; INTRALESIONAL; INTRAMUSCULAR; SOFT TISSUE ONCE
Status: ACTIVE | OUTPATIENT
Start: 2023-03-01

## 2023-03-01 NOTE — PROGRESS NOTES
3/01/2023-presents ambulatory to [facility] to establish care;NEW CONSULT- Nigerian speaking female; using language line pt reports she slipped on the ice  Back in January; she c/o of RLBP DOWN THE RLE; rates her pain 8/10; has been referred to [facility] by her PCP Dr. Hieu Vargas; examined by Dr. Rangel Fernando who is also using the language line; refer to his dictation for the plan of care.

## 2023-03-03 ENCOUNTER — TELEPHONE (OUTPATIENT)
Dept: GASTROENTEROLOGY | Facility: CLINIC | Age: 68
End: 2023-03-03

## 2023-03-03 NOTE — TELEPHONE ENCOUNTER
Results letter mailed to patient per   Colon recall entered for repeat in 3 yrs,2/16/2026  Colon done in 2/16/2023  HM Updated and Patient Outreach was placed for Colon recall.     Jessica Veliz MD  [facility] Clinical Staff  GI RNs - please print and mail this letter to pt; recall for colonoscopy exam in 3yrs

## 2023-03-24 ENCOUNTER — APPOINTMENT (OUTPATIENT)
Dept: GENERAL RADIOLOGY | Facility: HOSPITAL | Age: 68
End: 2023-03-24
Attending: EMERGENCY MEDICINE
Payer: MEDICARE

## 2023-03-24 ENCOUNTER — HOSPITAL ENCOUNTER (EMERGENCY)
Facility: HOSPITAL | Age: 68
Discharge: HOME | End: 2023-03-24
Attending: EMERGENCY MEDICINE
Payer: MEDICARE

## 2023-03-24 VITALS
DIASTOLIC BLOOD PRESSURE: 82 MMHG | HEART RATE: 62 BPM | BODY MASS INDEX: 32 KG/M2 | OXYGEN SATURATION: 99 % | SYSTOLIC BLOOD PRESSURE: 119 MMHG | TEMPERATURE: 98 F | WEIGHT: 170 LBS | RESPIRATION RATE: 20 BRPM

## 2023-03-24 DIAGNOSIS — S46.911A STRAIN OF RIGHT SHOULDER, INITIAL ENCOUNTER: Primary | ICD-10-CM

## 2023-03-24 DIAGNOSIS — S30.0XXA CONTUSION OF LOWER BACK, INITIAL ENCOUNTER: ICD-10-CM

## 2023-03-24 DIAGNOSIS — S76.312A HAMSTRING STRAIN, LEFT, INITIAL ENCOUNTER: ICD-10-CM

## 2023-03-24 PROCEDURE — 73030 X-RAY EXAM OF SHOULDER: CPT | Performed by: EMERGENCY MEDICINE

## 2023-03-24 PROCEDURE — 72100 X-RAY EXAM L-S SPINE 2/3 VWS: CPT | Performed by: EMERGENCY MEDICINE

## 2023-03-24 PROCEDURE — 99284 EMERGENCY DEPT VISIT MOD MDM: CPT

## 2023-03-24 PROCEDURE — 73552 X-RAY EXAM OF FEMUR 2/>: CPT | Performed by: EMERGENCY MEDICINE

## 2023-03-24 RX ORDER — HYDROCODONE BITARTRATE AND ACETAMINOPHEN 5; 325 MG/1; MG/1
1 TABLET ORAL EVERY 6 HOURS PRN
Qty: 10 TABLET | Refills: 0 | Status: SHIPPED | OUTPATIENT
Start: 2023-03-24

## 2023-03-24 NOTE — ED QUICK NOTES
Patient states slipping yesterday and falling in hospital.  Patient denies hitting his head. Patient denies being on blood thinners. Patient complains of right shoulder, left buttox \"bone\" & right under thigh pain.

## 2023-03-24 NOTE — ED INITIAL ASSESSMENT (HPI)
Patient complains of falling yesterday, complains of R shoulder pain and left posterior leg pain, ambulatory

## 2023-04-03 DIAGNOSIS — E78.1 HYPERTRIGLYCERIDEMIA: ICD-10-CM

## 2023-04-04 RX ORDER — FENOFIBRATE 145 MG/1
TABLET, COATED ORAL
Qty: 90 TABLET | Refills: 1 | Status: SHIPPED | OUTPATIENT
Start: 2023-04-04

## 2023-04-04 NOTE — TELEPHONE ENCOUNTER
Dr Sabino Andre, please advise if patient is to continue Fenofibrate? Last office visit 2/13/2023 showed continue present management and listed Fenofibrate on AVS, but med was discontinued by Epic user on 3/01/2023?

## 2023-04-17 DIAGNOSIS — B35.1 ONYCHOMYCOSIS: ICD-10-CM

## 2023-04-17 DIAGNOSIS — M19.049 HAND ARTHRITIS: ICD-10-CM

## 2023-04-17 RX ORDER — DULOXETIN HYDROCHLORIDE 30 MG/1
30 CAPSULE, DELAYED RELEASE ORAL EVERY MORNING
Qty: 90 CAPSULE | Refills: 3 | Status: SHIPPED | OUTPATIENT
Start: 2023-04-17

## 2023-04-18 RX ORDER — TERBINAFINE HYDROCHLORIDE 250 MG/1
250 TABLET ORAL DAILY
Qty: 90 TABLET | Refills: 3 | OUTPATIENT
Start: 2023-04-18

## 2023-04-18 NOTE — TELEPHONE ENCOUNTER
[employer] message sent both in [place] and [place] for medication clarification. Please review; protocol failed/no protocol. Requested Prescriptions   Pending Prescriptions Disp Refills    terbinafine 250 MG Oral Tab [Pharmacy Med Name: TERBINAFINE 250MG TABLETS] 90 tablet 3     Sig: Take 1 tablet (250 mg total) by mouth daily. There is no refill protocol information for this order      Signed Prescriptions Disp Refills    DULoxetine 30 MG Oral Cap DR Particles 90 capsule 3     Sig: Take 1 capsule (30 mg total) by mouth every morning.        Psychiatric Non-Scheduled (Anti-Anxiety) Passed - 4/17/2023  5:44 AM        Passed - In person appointment or virtual visit in the past 6 mos or appointment in next 3 mos     Recent Outpatient Visits              1 month ago Acute right-sided low back pain without sciatica    [facility] Adama Fuchs MD    Office Visit    2 months ago Encounter for annual health examination    [address] [facility], Johnna Mon MD    Office Visit    2 months ago [address], Johnna Mon MD    Office Visit    5 months ago Abdominal bloating    [facility], [address], [address], [place],     Office Visit    6 months ago [address], [address], Johnna Mon MD    Office Visit          Future Appointments         Provider Department Appt Notes    In 2 weeks Adama Fuchs MD [facility] follow up from trigger points  2/6/23                     Recent Outpatient Visits              1 month ago Acute right-sided low back pain without sciatica    [facility] Adama Fuchs MD    Office Visit    2 months ago Encounter for annual health examination    [address] [facility], Mookie Lujan, MD    Office Visit    2 months ago [address] [facility], Sammie Luz MD    Office Visit    5 months ago Abdominal bloating    [facility], [address], [address], [place], [place]    Office Visit    6 months ago [address], [address], Sammie Luz MD    Office Visit             Future Appointments         Provider Department Appt Notes    In 2 weeks Homer Heard MD [facility] for Pain Management follow up from trigger points  2/6/23

## 2023-04-18 NOTE — TELEPHONE ENCOUNTER
Refill passed per [place] [employer], [facility] protocol.      Requested Prescriptions   Pending Prescriptions Disp Refills    DULOXETINE 30 MG Oral Cap DR Particles [Pharmacy Med Name: DULOXETINE DR 30MG CAPSULES] 90 capsule 1     Sig: TAKE 1 CAPSULE(30 MG) BY MOUTH IN THE MORNING       Psychiatric Non-Scheduled (Anti-Anxiety) Passed - 4/17/2023  5:44 AM        Passed - In person appointment or virtual visit in the past 6 mos or appointment in next 3 mos     Recent Outpatient Visits              1 month ago Acute right-sided low back pain without sciatica    [facility] Dixon Garrett MD    Office Visit    2 months ago Encounter for annual health examination    [address] [facility], Anahy Giron MD    Office Visit    2 months ago [address] [facility], Anahy Giron MD    Office Visit    5 months ago Abdominal bloating    [facility], [address], [address], [place],     Office Visit    6 months ago [address], [facility], Anahy Giron MD    Office Visit          Future Appointments         Provider Department Appt Notes    In 2 weeks Dixon Garrett MD [facility] follow up from trigger points  2/6/23                 TERBINAFINE 250 MG Oral Tab [Pharmacy Med Name: TERBINAFINE 250MG TABLETS] 90 tablet 0     Sig: TAKE 1 TABLET(250 MG) BY MOUTH DAILY       There is no refill protocol information for this order           Future Appointments         Provider Department Appt Notes    In 2 weeks MD [facility] Jessica Brow[facility] for [facility] Management follow up from trigger points  2/6/23             Recent Outpatient Visits              1 month ago Acute right-sided low back pain without sciatica    [facility] Dixon Garrett MD    Office Visit    2 months ago Encounter for annual health examination    Miranda Burt MD    Office Visit    2 months ago Miranda Wang MD    Office Visit    5 months ago Abdominal bloating    [facility], [address], [address], [place],     Office Visit    6 months ago Miranda Garcia MD    Office Visit

## 2023-04-28 ENCOUNTER — NURSE TRIAGE (OUTPATIENT)
Dept: FAMILY MEDICINE CLINIC | Facility: CLINIC | Age: 68
End: 2023-04-28

## 2023-05-03 ENCOUNTER — OFFICE VISIT (OUTPATIENT)
Dept: PAIN CLINIC | Facility: HOSPITAL | Age: 68
End: 2023-05-03
Attending: ANESTHESIOLOGY
Payer: MEDICARE

## 2023-05-03 VITALS
HEART RATE: 84 BPM | RESPIRATION RATE: 18 BRPM | SYSTOLIC BLOOD PRESSURE: 120 MMHG | WEIGHT: 172 LBS | DIASTOLIC BLOOD PRESSURE: 78 MMHG | HEIGHT: 61 IN | BODY MASS INDEX: 32.47 KG/M2

## 2023-05-03 DIAGNOSIS — M79.10 MYALGIA: Primary | Chronic | ICD-10-CM

## 2023-05-03 DIAGNOSIS — M53.3 COCCYX PAIN: Chronic | ICD-10-CM

## 2023-05-03 PROCEDURE — 99211 OFF/OP EST MAY X REQ PHY/QHP: CPT

## 2023-05-03 PROCEDURE — 20552 NJX 1/MLT TRIGGER POINT 1/2: CPT

## 2023-05-03 RX ORDER — HYDROCODONE BITARTRATE AND ACETAMINOPHEN 5; 325 MG/1; MG/1
1 TABLET ORAL DAILY PRN
Qty: 30 TABLET | Refills: 0 | Status: SHIPPED | OUTPATIENT
Start: 2023-05-03 | End: 2023-06-02

## 2023-05-03 NOTE — PROGRESS NOTES
5/3/25646-[id number] ambulatory to [facility]; F/U-RT SI  TPI X3 IN OFFICE 3/1/2023 ORACIO; pt reports \"the shot helped, no more going down my leg,..when I touch the bone I have some pain\"  Pain today 4/10; Seen by Dr. Ansley Rey; refer to documentation for the continued plan of care.

## 2023-05-04 ENCOUNTER — OFFICE VISIT (OUTPATIENT)
Dept: FAMILY MEDICINE CLINIC | Facility: CLINIC | Age: 68
End: 2023-05-04

## 2023-05-04 VITALS
BODY MASS INDEX: 31.53 KG/M2 | SYSTOLIC BLOOD PRESSURE: 114 MMHG | TEMPERATURE: 97 F | WEIGHT: 167 LBS | HEART RATE: 62 BPM | HEIGHT: 61 IN | DIASTOLIC BLOOD PRESSURE: 74 MMHG

## 2023-05-04 DIAGNOSIS — M17.0 PRIMARY OSTEOARTHRITIS OF BOTH KNEES: ICD-10-CM

## 2023-05-04 DIAGNOSIS — M27.40 JAW CYST: Primary | ICD-10-CM

## 2023-05-04 DIAGNOSIS — M79.10 MYALGIA: Chronic | ICD-10-CM

## 2023-05-04 PROCEDURE — 99214 OFFICE O/P EST MOD 30 MIN: CPT | Performed by: FAMILY MEDICINE

## 2023-05-04 PROCEDURE — 3074F SYST BP LT 130 MM HG: CPT | Performed by: FAMILY MEDICINE

## 2023-05-04 PROCEDURE — 3008F BODY MASS INDEX DOCD: CPT | Performed by: FAMILY MEDICINE

## 2023-05-04 PROCEDURE — 1159F MED LIST DOCD IN RCRD: CPT | Performed by: FAMILY MEDICINE

## 2023-05-04 PROCEDURE — 3078F DIAST BP <80 MM HG: CPT | Performed by: FAMILY MEDICINE

## 2023-06-01 DIAGNOSIS — G47.9 SLEEP DISORDER: ICD-10-CM

## 2023-06-01 RX ORDER — CLONAZEPAM 0.5 MG/1
0.5 TABLET ORAL NIGHTLY PRN
Qty: 30 TABLET | Refills: 0 | Status: SHIPPED | OUTPATIENT
Start: 2023-06-01

## 2023-06-14 ENCOUNTER — TELEPHONE (OUTPATIENT)
Dept: FAMILY MEDICINE CLINIC | Facility: CLINIC | Age: 68
End: 2023-06-14

## 2023-06-14 DIAGNOSIS — E11.9 TYPE 2 DIABETES MELLITUS WITHOUT COMPLICATION, WITHOUT LONG-TERM CURRENT USE OF INSULIN (HCC): Primary | Chronic | ICD-10-CM

## 2023-06-15 NOTE — TELEPHONE ENCOUNTER
Will file in chart as: Divine Nuñez Does not apply Misc    The original prescription was discontinued on 3/1/2023 by Vearl Fothergill Renewing this prescription may not be appropriate. Language line assistance. Left message to call back if Pt needs medication.   Last request for diabetes supply was in 2020

## 2023-06-16 RX ORDER — LANCETS 30 GAUGE
EACH MISCELLANEOUS
Qty: 100 EACH | Refills: 3 | Status: SHIPPED | OUTPATIENT
Start: 2023-06-16

## 2023-06-16 RX ORDER — BLOOD SUGAR DIAGNOSTIC
STRIP MISCELLANEOUS
Qty: 100 STRIP | Refills: 3 | Status: SHIPPED | OUTPATIENT
Start: 2023-06-16

## 2023-06-16 NOTE — TELEPHONE ENCOUNTER
Patient contacted. She does need lancets and running low on test strips. One touch Reza Castro confirmed, donna

## 2023-06-16 NOTE — TELEPHONE ENCOUNTER
Refill passed per [employer], [facility] protocol.     Requested Prescriptions   Pending Prescriptions Disp Refills    ONEUCH [address] [facility] Does not apply Misc [Pharmacy Med Name: ONE TOUCH DELICA PLUS 13G LANCETS] 100 each 3     Sig: TEST ONCE DAILY       Diabetic Supplies Protocol Passed - 6/14/2023 10:32 AM        Passed - In person appointment or virtual visit in the past 12 mos or appointment in next 3 mos     Recent Outpatient Visits              1 month ago Jaw cyst    [address] [facility], [address], [place], DO    Office Visit    1 month ago [address] for Pain Management Trish Gooden MD    Office Visit    3 months ago Acute right-sided low back pain without sciatica    [facility] Trish Gooden MD    Office Visit    4 months ago Encounter for annual health examination    [address] [facility], Aung Melton MD    Office Visit    4 months ago [address] [facility], Aung Melton MD    Office Visit          Future Appointments         Provider Department Appt Notes    In 1 week Trish Gooden MD [facility] 1-2 mo f/u - trigger point inj done 5/3/23                 Glucose Blood (ONETOUCH VERIO) In Vitro Strip 100 strip 3     Sig: Test glucose once daily       There is no refill protocol information for this order           Recent Outpatient Visits              1 month ago Jaw cyst    [facility], [address], [address], [place], DO    Office Visit    1 month ago [address] for Pain Management Trish Gooden MD    Office Visit    3 months ago Acute right-sided low back pain without sciatica    [facility] Trish Gooden MD    Office Visit    4 months ago Encounter for annual health examination Foster Daugherty, Sammie Luz MD    Office Visit    4 months ago [address] [facility], [facility], Sammie Luz MD    Office Visit            Future Appointments         Provider Department Appt Notes    In 1 week Homer Heard MD [facility] for [facility] Management 1-2 mo f/u - trigger point inj done 5/3/23

## 2023-06-19 DIAGNOSIS — E78.2 MIXED HYPERLIPIDEMIA: ICD-10-CM

## 2023-06-19 RX ORDER — PRAVASTATIN SODIUM 10 MG
TABLET ORAL
Qty: 90 TABLET | Refills: 1 | OUTPATIENT
Start: 2023-06-19

## 2023-06-19 RX ORDER — PRAVASTATIN SODIUM 10 MG
TABLET ORAL
Qty: 90 TABLET | Refills: 3 | Status: SHIPPED | OUTPATIENT
Start: 2023-06-19

## 2023-07-12 ENCOUNTER — TELEPHONE (OUTPATIENT)
Dept: FAMILY MEDICINE CLINIC | Facility: CLINIC | Age: 68
End: 2023-07-12

## 2023-07-12 DIAGNOSIS — M19.049 HAND ARTHRITIS: Primary | ICD-10-CM

## 2023-07-12 DIAGNOSIS — G47.9 SLEEP DISORDER: ICD-10-CM

## 2023-07-12 NOTE — TELEPHONE ENCOUNTER
Patient states that she has run out of the Hydrocodone medication for her arthritis and the Clonazepam 5mg. Current Outpatient Medications   Medication Sig Dispense Refill                         clonazePAM 0.5 MG Oral Tab Take 1 tablet (0.5 mg total) by mouth nightly as needed for Anxiety.  30 tablet 0

## 2023-07-13 RX ORDER — HYDROCODONE BITARTRATE AND ACETAMINOPHEN 5; 325 MG/1; MG/1
1 TABLET ORAL DAILY PRN
Qty: 30 TABLET | Refills: 0 | Status: SHIPPED | OUTPATIENT
Start: 2023-07-13 | End: 2023-08-12

## 2023-07-13 RX ORDER — CLONAZEPAM 0.5 MG/1
0.5 TABLET ORAL NIGHTLY PRN
Qty: 30 TABLET | Refills: 0 | Status: SHIPPED | OUTPATIENT
Start: 2023-07-13

## 2023-07-13 NOTE — TELEPHONE ENCOUNTER
Please review; no protocol  Medication pended for your review and approval.     Requested Prescriptions   Pending Prescriptions Disp Refills    clonazePAM 0.5 MG Oral Tab 30 tablet 0     Sig: Take 1 tablet (0.5 mg total) by mouth nightly as needed for Anxiety. There is no refill protocol information for this order       HYDROcodone-acetaminophen 5-325 MG Oral Tab 30 tablet 0     Sig: Take 1 tablet by mouth daily as needed for Pain.        There is no refill protocol information for this order

## 2023-07-14 NOTE — TELEPHONE ENCOUNTER
Lao Language Line: Neisha Barnard ID # [id number]. Attempted to call the patient, unable to leave message, voice mailbox is full. Attempted to call patient's daughter Josefa Whittington (on AAKASH), call got disconnected. Lao Language LineNichole Torres ID # [id number]. Spoke to patient's daughter Josefa Whittington (on AAKASH), verified Name and . Relayed that patient's medications below were sent to the pharmacy yesterday. She will relay the message to the patient. Daughter verbalized understanding and had no further questions at this time.

## 2023-07-14 NOTE — TELEPHONE ENCOUNTER
Patient called regarding the medications below stated that she is still waiting for refill and as of now is completely out of medications    Current Outpatient Medications:     clonazePAM 0.5 MG Oral Tab, Take 1 tablet (0.5 mg total) by mouth nightly as needed for Anxiety. , Disp: 30 tablet, Rfl: 0    HYDROcodone-acetaminophen 5-325 MG Oral Tab, Take 1 tablet by mouth daily as needed for Pain., Disp: 30 tablet, Rfl: 0

## 2023-08-31 DIAGNOSIS — E07.9 THYROID DISEASE: Chronic | ICD-10-CM

## 2023-08-31 RX ORDER — LEVOTHYROXINE SODIUM 0.03 MG/1
25 TABLET ORAL
Qty: 90 TABLET | Refills: 3 | Status: SHIPPED | OUTPATIENT
Start: 2023-08-31

## 2023-09-25 DIAGNOSIS — G47.9 SLEEP DISORDER: ICD-10-CM

## 2023-09-25 RX ORDER — CLONAZEPAM 0.5 MG/1
0.5 TABLET ORAL NIGHTLY PRN
Qty: 30 TABLET | Refills: 0 | OUTPATIENT
Start: 2023-09-25

## 2023-09-26 RX ORDER — CLONAZEPAM 0.5 MG/1
0.5 TABLET ORAL NIGHTLY PRN
Qty: 30 TABLET | Refills: 0 | Status: SHIPPED | OUTPATIENT
Start: 2023-09-26

## 2023-09-26 NOTE — TELEPHONE ENCOUNTER
Please review refill protocol failed/ no protocol  Requested Prescriptions   Pending Prescriptions Disp Refills    CLONAZEPAM 0.5 MG Oral Tab [Pharmacy Med Name: CLONAZEPAM 0.5MG TABLETS] 30 tablet 0     Sig: TAKE 1 TABLET(0.5 MG) BY MOUTH EVERY NIGHT AS NEEDED FOR ANXIETY       There is no refill protocol information for this order

## 2023-10-17 ENCOUNTER — OFFICE VISIT (OUTPATIENT)
Dept: FAMILY MEDICINE CLINIC | Facility: CLINIC | Age: 68
End: 2023-10-17

## 2023-10-17 VITALS
WEIGHT: 169 LBS | DIASTOLIC BLOOD PRESSURE: 74 MMHG | BODY MASS INDEX: 32 KG/M2 | SYSTOLIC BLOOD PRESSURE: 114 MMHG | HEART RATE: 73 BPM

## 2023-10-17 DIAGNOSIS — Z23 NEEDS FLU SHOT: ICD-10-CM

## 2023-10-17 DIAGNOSIS — M19.049 HAND ARTHRITIS: ICD-10-CM

## 2023-10-17 DIAGNOSIS — E11.9 TYPE 2 DIABETES MELLITUS WITHOUT COMPLICATION, WITHOUT LONG-TERM CURRENT USE OF INSULIN (HCC): Primary | ICD-10-CM

## 2023-10-17 PROCEDURE — 3074F SYST BP LT 130 MM HG: CPT | Performed by: FAMILY MEDICINE

## 2023-10-17 PROCEDURE — G0008 ADMIN INFLUENZA VIRUS VAC: HCPCS | Performed by: FAMILY MEDICINE

## 2023-10-17 PROCEDURE — 90662 IIV NO PRSV INCREASED AG IM: CPT | Performed by: FAMILY MEDICINE

## 2023-10-17 PROCEDURE — 3078F DIAST BP <80 MM HG: CPT | Performed by: FAMILY MEDICINE

## 2023-10-17 PROCEDURE — 99214 OFFICE O/P EST MOD 30 MIN: CPT | Performed by: FAMILY MEDICINE

## 2023-10-17 PROCEDURE — 1159F MED LIST DOCD IN RCRD: CPT | Performed by: FAMILY MEDICINE

## 2023-10-17 PROCEDURE — 1160F RVW MEDS BY RX/DR IN RCRD: CPT | Performed by: FAMILY MEDICINE

## 2023-10-17 RX ORDER — HYDROCODONE BITARTRATE AND ACETAMINOPHEN 5; 325 MG/1; MG/1
1 TABLET ORAL DAILY PRN
Qty: 30 TABLET | Refills: 0 | Status: SHIPPED | OUTPATIENT
Start: 2023-10-17 | End: 2023-11-16

## 2023-10-26 NOTE — TELEPHONE ENCOUNTER
ANY Spoke to patient and she is requesting parking placard due to arthritis of both knees, hx of surgery as well. Per patient she has had temporary parking placards before due to difficulty ambulating and is now requesting this be a permanent parking placard. Please advise    Patient requested appointment for urinary sxs, appointment made. TELE [id number]

## 2023-10-30 ENCOUNTER — HOSPITAL ENCOUNTER (OUTPATIENT)
Dept: MAMMOGRAPHY | Facility: HOSPITAL | Age: 68
Discharge: HOME | End: 2023-10-30
Attending: FAMILY MEDICINE
Payer: MEDICARE

## 2023-10-30 ENCOUNTER — HOSPITAL ENCOUNTER (OUTPATIENT)
Dept: MAMMOGRAPHY | Age: 68
End: 2023-10-30
Attending: FAMILY MEDICINE
Payer: MEDICARE

## 2023-10-30 DIAGNOSIS — Z12.31 ENCOUNTER FOR SCREENING MAMMOGRAM FOR MALIGNANT NEOPLASM OF BREAST: ICD-10-CM

## 2023-10-30 PROCEDURE — 77063 BREAST TOMOSYNTHESIS BI: CPT | Performed by: FAMILY MEDICINE

## 2023-10-30 PROCEDURE — 77067 SCR MAMMO BI INCL CAD: CPT | Performed by: FAMILY MEDICINE

## 2023-11-07 ENCOUNTER — NURSE TRIAGE (OUTPATIENT)
Dept: FAMILY MEDICINE CLINIC | Facility: CLINIC | Age: 68
End: 2023-11-07

## 2023-11-07 NOTE — TELEPHONE ENCOUNTER
Action Requested: Summary for Provider     []  Critical Lab, Recommendations Needed  [] Need Additional Advice  []   FYI    []   Need Orders  [] Need Medications Sent to Pharmacy  []  Other     SUMMARY: Pt reports cough 6 days ago, no fever, no congestion, no runny nose. Throat burns, unable to sleep due to cough. Pt states feels better when sitting up. Home care measures provided for Pt. To try otc cough medication that does not increase blood pressure. Advised to stop smoking. Pt to call back if no improvement or worsening.   Pt verbalized understanding  Reason for call: Cough (6 days ago)  Onset: Data Unavailable                     Reason for Disposition   Cough with no complications    Protocols used: Cough-A-OH

## 2023-12-01 ENCOUNTER — NURSE TRIAGE (OUTPATIENT)
Dept: FAMILY MEDICINE CLINIC | Facility: CLINIC | Age: 68
End: 2023-12-01

## 2023-12-01 NOTE — TELEPHONE ENCOUNTER
Patient called seeking appt. C/o Depression, not able sleep. Doesn't want to leave the house. Has been trouble having sleep. Has bouts of crying. Usually uses clonazepam as needed. Has not used it in a while. Denied suicidal ideations. Appt made today with Dr Mary Arechiga. PCP is not available today. Irish speaking patient.      Reason for Disposition   Depression is worsening (e.g.,sleeping poorly, less able to do activities of daily living)    Protocols used: Depression-A-OH

## 2023-12-07 DIAGNOSIS — G47.9 SLEEP DISORDER: ICD-10-CM

## 2023-12-07 NOTE — TELEPHONE ENCOUNTER
Please review. Protocol failed / Has no protocol. Recent fills: 7/13, 9/26    Requested Prescriptions   Pending Prescriptions Disp Refills    clonazePAM 0.5 MG Oral Tab 30 tablet 0     Sig: Take 1 tablet (0.5 mg total) by mouth nightly as needed.        There is no refill protocol information for this order        Future Appointments         Provider Department Appt Notes    In 3 weeks Luis Alberto Grimaldo MD [facility], [facility] 1 month follow up           Recent Outpatient Visits              6 days ago Other depression    Starr Baig MD    Office Visit    1 month ago Type 2 diabetes mellitus without complication, without long-term current use of insulin ([facility] [address]    Naima Marley, Queenie Sanchez MD    Office Visit    7 months ago Jaw cyst    [facility], [place], [address], [place],     Office Visit    7 months ago [address]  [address] for Pain Management Mikael Dumont MD    Office Visit    9 months ago Acute right-sided low back pain without sciatica    [facility] for [facility] Mikael Dumont MD    Office Visit

## 2023-12-08 RX ORDER — CLONAZEPAM 0.5 MG/1
0.5 TABLET ORAL NIGHTLY PRN
Qty: 30 TABLET | Refills: 0 | Status: SHIPPED | OUTPATIENT
Start: 2023-12-08

## 2023-12-26 ENCOUNTER — OFFICE VISIT (OUTPATIENT)
Dept: FAMILY MEDICINE CLINIC | Facility: CLINIC | Age: 68
End: 2023-12-26

## 2023-12-26 VITALS
DIASTOLIC BLOOD PRESSURE: 81 MMHG | WEIGHT: 163 LBS | BODY MASS INDEX: 31 KG/M2 | SYSTOLIC BLOOD PRESSURE: 115 MMHG | HEART RATE: 76 BPM

## 2023-12-26 DIAGNOSIS — S16.1XXA STRAIN OF NECK MUSCLE, INITIAL ENCOUNTER: ICD-10-CM

## 2023-12-26 DIAGNOSIS — M25.561 RIGHT KNEE PAIN, UNSPECIFIED CHRONICITY: ICD-10-CM

## 2023-12-26 DIAGNOSIS — Z09 HOSPITAL DISCHARGE FOLLOW-UP: Primary | ICD-10-CM

## 2023-12-26 PROCEDURE — 3079F DIAST BP 80-89 MM HG: CPT

## 2023-12-26 PROCEDURE — 3074F SYST BP LT 130 MM HG: CPT

## 2023-12-26 PROCEDURE — 1159F MED LIST DOCD IN RCRD: CPT

## 2023-12-26 PROCEDURE — 1125F AMNT PAIN NOTED PAIN PRSNT: CPT

## 2023-12-26 PROCEDURE — 1160F RVW MEDS BY RX/DR IN RCRD: CPT

## 2023-12-26 PROCEDURE — 99213 OFFICE O/P EST LOW 20 MIN: CPT

## 2023-12-26 RX ORDER — CYCLOBENZAPRINE HCL 10 MG
10 TABLET ORAL NIGHTLY
Qty: 15 TABLET | Refills: 0 | Status: SHIPPED | OUTPATIENT
Start: 2023-12-26 | End: 2024-01-15

## 2023-12-26 RX ORDER — ACETAMINOPHEN 500 MG
500 TABLET ORAL EVERY 6 HOURS PRN
Qty: 30 TABLET | Refills: 0 | Status: SHIPPED | OUTPATIENT
Start: 2023-12-26
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
## 2024-01-08 ENCOUNTER — HOSPITAL ENCOUNTER (EMERGENCY)
Facility: HOSPITAL | Age: 69
Discharge: HOME | End: 2024-01-08
Attending: STUDENT IN AN ORGANIZED HEALTH CARE EDUCATION/TRAINING PROGRAM
Payer: MEDICARE

## 2024-01-08 VITALS
RESPIRATION RATE: 18 BRPM | BODY MASS INDEX: 33 KG/M2 | WEIGHT: 175 LBS | HEART RATE: 69 BPM | OXYGEN SATURATION: 99 % | DIASTOLIC BLOOD PRESSURE: 84 MMHG | SYSTOLIC BLOOD PRESSURE: 128 MMHG | TEMPERATURE: 98 F

## 2024-01-08 DIAGNOSIS — G44.209 TENSION HEADACHE: Primary | ICD-10-CM

## 2024-01-08 DIAGNOSIS — S16.1XXA STRAIN OF NECK MUSCLE, INITIAL ENCOUNTER: ICD-10-CM

## 2024-01-08 PROCEDURE — 96372 THER/PROPH/DIAG INJ SC/IM: CPT

## 2024-01-08 PROCEDURE — 99283 EMERGENCY DEPT VISIT LOW MDM: CPT

## 2024-01-08 PROCEDURE — 99284 EMERGENCY DEPT VISIT MOD MDM: CPT

## 2024-01-08 RX ORDER — KETOROLAC TROMETHAMINE 15 MG/ML
15 INJECTION, SOLUTION INTRAMUSCULAR; INTRAVENOUS ONCE
Status: COMPLETED | OUTPATIENT
Start: 2024-01-08 | End: 2024-01-08

## 2024-01-08 RX ORDER — TRAMADOL HYDROCHLORIDE 50 MG/1
TABLET ORAL EVERY 6 HOURS PRN
Qty: 10 TABLET | Refills: 0 | Status: SHIPPED | OUTPATIENT
Start: 2024-01-08 | End: 2024-01-13

## 2024-01-08 RX ORDER — TRAMADOL HYDROCHLORIDE 50 MG/1
50 TABLET ORAL ONCE
Status: COMPLETED | OUTPATIENT
Start: 2024-01-08 | End: 2024-01-08
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
## 2024-01-08 NOTE — ED QUICK NOTES
MD cleared patient for discharge. Patient provided with discharge paperwork and RN discussed plan of care. Patient given opportunity to ask any questions. MD prescribed 1 medication. Patient was in no apparent distress. Patient instructed to follow up with PCP. Patient ambulated out of ED with steady gait.
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
## 2024-01-09 ENCOUNTER — TELEPHONE (OUTPATIENT)
Dept: FAMILY MEDICINE CLINIC | Facility: CLINIC | Age: 69
End: 2024-01-09

## 2024-01-09 NOTE — TELEPHONE ENCOUNTER
Patient scheduled an appt MyChart for a hospital follow-up for 02/08/2024. I looked and there are no appt sooner than that. Is it okay for patient to wait that long? Per chat she was hospitalized on 12/25/2023 and 01/08/2024.

## 2024-01-10 ENCOUNTER — PATIENT OUTREACH (OUTPATIENT)
Dept: CASE MANAGEMENT | Age: 69
End: 2024-01-10

## 2024-01-10 NOTE — PROGRESS NOTES
1st attempt ER f/up apt request  PCP -existing apt (2/8, pt doesn't want to move up apt  Closing encounter

## 2024-01-11 NOTE — TELEPHONE ENCOUNTER
Okay to keep that appointment for now.  Hopefully she was able to take the tramadol that she was prescribed on 1/8 at her ER visit.  If worsening symptoms can consider double booking next week.

## 2024-01-24 DIAGNOSIS — E78.1 HYPERTRIGLYCERIDEMIA: ICD-10-CM

## 2024-01-25 RX ORDER — FENOFIBRATE 145 MG/1
145 TABLET, COATED ORAL NIGHTLY
Qty: 90 TABLET | Refills: 3 | Status: SHIPPED | OUTPATIENT
Start: 2024-01-25

## 2024-01-25 NOTE — TELEPHONE ENCOUNTER
Refill passed per [facility] protocol.    Requested Prescriptions   Pending Prescriptions Disp Refills    FENOFIBRATE 145 MG Oral Tab [Pharmacy Med Name: FENOFIBRATE 145MG TABLETS] 90 tablet 1     Sig: TAKE 1 TABLET(145 MG) BY MOUTH EVERY NIGHT       Cholesterol Medication Protocol Passed - 1/24/2024  5:46 AM        Passed - ALT in past 12 months        Passed - LDL in past 12 months        Passed - Last ALT < 80     Lab Results   Component Value Date    ALT 28 02/06/2023             Passed - Last LDL < 130     Lab Results   Component Value Date    LDL 76 02/06/2023             Passed - In person appointment or virtual visit in the past 12 mos or appointment in next 3 mos     Recent Outpatient Visits              1 month ago Hospital discharge follow-up    [facility] Clarke Alexander APRN    Office Visit    1 month ago [facility]    [facility]    3 months ago Type 2 diabetes mellitus without complication, without long-term current use of insulin (HCC)    [facility] Sanam Haro MD    Office Visit    8 months ago Jaw cyst    [facility] Rudy Torres DO    Office Visit    8 months ago [facility] Lukasz Talbert MD    Office Visit          Future Appointments         Provider Department Appt Notes    In 2 weeks Sanam Haro MD [facility] follow up- Please call me if there are any cancellations                    Recent Outpatient Visits              1 month ago Hospital discharge follow-up    [facility] Clarke Alexander APRN    Office Visit    1 month ago Other depression    [facility] [place]    Office  Visit    3 months ago Type 2 diabetes mellitus without complication, without long-term current use of insulin (HCC)    [facility] Sanam Haro MD    Office Visit    8 months ago Jaw cyst    [facility] Rudy Torres DO    Office Visit    8 months ago [facility] for Pain Management Lukasz Talbert MD    Office Visit            Future Appointments         Provider Department Appt Notes    In 2 weeks Sanam Haro MD [facility] follow up- Please call me if there are any cancellations

## 2024-02-08 ENCOUNTER — OFFICE VISIT (OUTPATIENT)
Dept: FAMILY MEDICINE CLINIC | Facility: CLINIC | Age: 69
End: 2024-02-08

## 2024-02-08 ENCOUNTER — LAB ENCOUNTER (OUTPATIENT)
Dept: LAB | Age: 69
End: 2024-02-08
Attending: FAMILY MEDICINE
Payer: MEDICARE

## 2024-02-08 VITALS
DIASTOLIC BLOOD PRESSURE: 72 MMHG | HEART RATE: 66 BPM | WEIGHT: 166 LBS | SYSTOLIC BLOOD PRESSURE: 110 MMHG | BODY MASS INDEX: 31 KG/M2

## 2024-02-08 DIAGNOSIS — Z00.00 ENCOUNTER FOR ANNUAL HEALTH EXAMINATION: ICD-10-CM

## 2024-02-08 DIAGNOSIS — E11.9 TYPE 2 DIABETES MELLITUS WITHOUT COMPLICATION, WITHOUT LONG-TERM CURRENT USE OF INSULIN (HCC): ICD-10-CM

## 2024-02-08 DIAGNOSIS — M54.2 NECK PAIN: Primary | ICD-10-CM

## 2024-02-08 DIAGNOSIS — G47.9 SLEEP DISORDER: ICD-10-CM

## 2024-02-08 DIAGNOSIS — R25.2 MUSCLE CRAMP: ICD-10-CM

## 2024-02-08 LAB
ALBUMIN SERPL-MCNC: 4.4 G/DL (ref 3.2–4.8)
ALBUMIN/GLOB SERPL: 1.5 {RATIO} (ref 1–2)
ALP LIVER SERPL-CCNC: 84 U/L
ALT SERPL-CCNC: 20 U/L
ANION GAP SERPL CALC-SCNC: 6 MMOL/L (ref 0–18)
AST SERPL-CCNC: 26 U/L (ref ?–34)
BASOPHILS # BLD AUTO: 0.05 X10(3) UL (ref 0–0.2)
BASOPHILS NFR BLD AUTO: 0.7 %
BILIRUB SERPL-MCNC: 0.3 MG/DL (ref 0.2–1.1)
BUN BLD-MCNC: 17 MG/DL (ref 9–23)
BUN/CREAT SERPL: 21.5 (ref 10–20)
CALCIUM BLD-MCNC: 9.9 MG/DL (ref 8.7–10.4)
CHLORIDE SERPL-SCNC: 106 MMOL/L (ref 98–112)
CHOLEST SERPL-MCNC: 129 MG/DL (ref ?–200)
CO2 SERPL-SCNC: 28 MMOL/L (ref 21–32)
CREAT BLD-MCNC: 0.79 MG/DL
CREAT UR-SCNC: 80.5 MG/DL
DEPRECATED RDW RBC AUTO: 45.2 FL (ref 35.1–46.3)
EGFRCR SERPLBLD CKD-EPI 2021: 81 ML/MIN/1.73M2 (ref 60–?)
EOSINOPHIL # BLD AUTO: 0.17 X10(3) UL (ref 0–0.7)
EOSINOPHIL NFR BLD AUTO: 2.5 %
ERYTHROCYTE [DISTWIDTH] IN BLOOD BY AUTOMATED COUNT: 13.7 % (ref 11–15)
EST. AVERAGE GLUCOSE BLD GHB EST-MCNC: 117 MG/DL (ref 68–126)
FASTING PATIENT LIPID ANSWER: NO
FASTING STATUS PATIENT QL REPORTED: NO
GLOBULIN PLAS-MCNC: 2.9 G/DL (ref 2.8–4.4)
GLUCOSE BLD-MCNC: 84 MG/DL (ref 70–99)
HBA1C MFR BLD: 5.7 % (ref ?–5.7)
HCT VFR BLD AUTO: 41.7 %
HDLC SERPL-MCNC: 53 MG/DL (ref 40–59)
HGB BLD-MCNC: 13.9 G/DL
IMM GRANULOCYTES # BLD AUTO: 0.06 X10(3) UL (ref 0–1)
IMM GRANULOCYTES NFR BLD: 0.9 %
LDLC SERPL CALC-MCNC: 60 MG/DL (ref ?–100)
LYMPHOCYTES # BLD AUTO: 2.56 X10(3) UL (ref 1–4)
LYMPHOCYTES NFR BLD AUTO: 37.9 %
MCH RBC QN AUTO: 30.2 PG (ref 26–34)
MCHC RBC AUTO-ENTMCNC: 33.3 G/DL (ref 31–37)
MCV RBC AUTO: 90.7 FL
MICROALBUMIN UR-MCNC: 0.3 MG/DL
MICROALBUMIN/CREAT 24H UR-RTO: 3.7 UG/MG (ref ?–30)
MONOCYTES # BLD AUTO: 0.5 X10(3) UL (ref 0.1–1)
MONOCYTES NFR BLD AUTO: 7.4 %
NEUTROPHILS # BLD AUTO: 3.42 X10 (3) UL (ref 1.5–7.7)
NEUTROPHILS # BLD AUTO: 3.42 X10(3) UL (ref 1.5–7.7)
NEUTROPHILS NFR BLD AUTO: 50.6 %
NONHDLC SERPL-MCNC: 76 MG/DL (ref ?–130)
OSMOLALITY SERPL CALC.SUM OF ELEC: 291 MOSM/KG (ref 275–295)
PLATELET # BLD AUTO: 224 10(3)UL (ref 150–450)
POTASSIUM SERPL-SCNC: 3.8 MMOL/L (ref 3.5–5.1)
PROT SERPL-MCNC: 7.3 G/DL (ref 5.7–8.2)
RBC # BLD AUTO: 4.6 X10(6)UL
SODIUM SERPL-SCNC: 140 MMOL/L (ref 136–145)
TRIGL SERPL-MCNC: 85 MG/DL (ref 30–149)
TSI SER-ACNC: 2.84 MIU/ML (ref 0.55–4.78)
VLDLC SERPL CALC-MCNC: 12 MG/DL (ref 0–30)
WBC # BLD AUTO: 6.8 X10(3) UL (ref 4–11)

## 2024-02-08 PROCEDURE — 80053 COMPREHEN METABOLIC PANEL: CPT

## 2024-02-08 PROCEDURE — 82043 UR ALBUMIN QUANTITATIVE: CPT

## 2024-02-08 PROCEDURE — 84443 ASSAY THYROID STIM HORMONE: CPT

## 2024-02-08 PROCEDURE — 1126F AMNT PAIN NOTED NONE PRSNT: CPT | Performed by: FAMILY MEDICINE

## 2024-02-08 PROCEDURE — 1111F DSCHRG MED/CURRENT MED MERGE: CPT | Performed by: FAMILY MEDICINE

## 2024-02-08 PROCEDURE — 36415 COLL VENOUS BLD VENIPUNCTURE: CPT

## 2024-02-08 PROCEDURE — 1159F MED LIST DOCD IN RCRD: CPT | Performed by: FAMILY MEDICINE

## 2024-02-08 PROCEDURE — 99214 OFFICE O/P EST MOD 30 MIN: CPT | Performed by: FAMILY MEDICINE

## 2024-02-08 PROCEDURE — 82570 ASSAY OF URINE CREATININE: CPT

## 2024-02-08 PROCEDURE — 85025 COMPLETE CBC W/AUTO DIFF WBC: CPT

## 2024-02-08 PROCEDURE — 3078F DIAST BP <80 MM HG: CPT | Performed by: FAMILY MEDICINE

## 2024-02-08 PROCEDURE — 3074F SYST BP LT 130 MM HG: CPT | Performed by: FAMILY MEDICINE

## 2024-02-08 PROCEDURE — 83036 HEMOGLOBIN GLYCOSYLATED A1C: CPT

## 2024-02-08 PROCEDURE — 1160F RVW MEDS BY RX/DR IN RCRD: CPT | Performed by: FAMILY MEDICINE

## 2024-02-08 PROCEDURE — 80061 LIPID PANEL: CPT

## 2024-02-08 RX ORDER — HYDROCODONE BITARTRATE AND ACETAMINOPHEN 5; 325 MG/1; MG/1
1 TABLET ORAL EVERY 6 HOURS PRN
Qty: 20 TABLET | Refills: 0 | Status: SHIPPED | OUTPATIENT
Start: 2024-02-08

## 2024-02-08 RX ORDER — CLONAZEPAM 0.5 MG/1
0.5 TABLET ORAL NIGHTLY PRN
Qty: 30 TABLET | Refills: 0 | Status: SHIPPED | OUTPATIENT
Start: 2024-02-08

## 2024-02-08 RX ORDER — METHYLPREDNISOLONE 4 MG/1
TABLET ORAL
Qty: 1 EACH | Refills: 0 | Status: SHIPPED | OUTPATIENT
Start: 2024-02-08

## 2024-02-08 RX ORDER — BACLOFEN 10 MG/1
10 TABLET ORAL DAILY PRN
Qty: 60 TABLET | Refills: 1 | Status: SHIPPED | OUTPATIENT
Start: 2024-02-08
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
## 2024-02-13 ENCOUNTER — OFFICE VISIT (OUTPATIENT)
Dept: FAMILY MEDICINE CLINIC | Facility: CLINIC | Age: 69
End: 2024-02-13

## 2024-02-13 VITALS
DIASTOLIC BLOOD PRESSURE: 70 MMHG | SYSTOLIC BLOOD PRESSURE: 112 MMHG | BODY MASS INDEX: 31 KG/M2 | WEIGHT: 166 LBS | HEART RATE: 64 BPM

## 2024-02-13 DIAGNOSIS — K21.9 GASTROESOPHAGEAL REFLUX DISEASE WITHOUT ESOPHAGITIS: ICD-10-CM

## 2024-02-13 DIAGNOSIS — E11.9 TYPE 2 DIABETES MELLITUS WITHOUT COMPLICATION, WITHOUT LONG-TERM CURRENT USE OF INSULIN (HCC): Chronic | ICD-10-CM

## 2024-02-13 DIAGNOSIS — M53.3 COCCYX PAIN: Chronic | ICD-10-CM

## 2024-02-13 DIAGNOSIS — M19.049 HAND ARTHRITIS: ICD-10-CM

## 2024-02-13 DIAGNOSIS — M17.0 PRIMARY OSTEOARTHRITIS OF BOTH KNEES: ICD-10-CM

## 2024-02-13 DIAGNOSIS — E55.9 VITAMIN D DEFICIENCY: ICD-10-CM

## 2024-02-13 DIAGNOSIS — E07.9 THYROID DISEASE: Chronic | ICD-10-CM

## 2024-02-13 DIAGNOSIS — I10 ESSENTIAL HYPERTENSION: Chronic | ICD-10-CM

## 2024-02-13 DIAGNOSIS — Z00.00 ENCOUNTER FOR ANNUAL HEALTH EXAMINATION: Primary | ICD-10-CM

## 2024-02-13 DIAGNOSIS — J41.0 SMOKERS' COUGH (HCC): Chronic | ICD-10-CM

## 2024-02-13 DIAGNOSIS — E78.2 MIXED HYPERLIPIDEMIA: Chronic | ICD-10-CM

## 2024-02-13 DIAGNOSIS — I83.11 VARICOSE VEINS OF BOTH LOWER EXTREMITIES WITH INFLAMMATION: ICD-10-CM

## 2024-02-13 DIAGNOSIS — D69.2 SENILE PURPURA (HCC): ICD-10-CM

## 2024-02-13 DIAGNOSIS — I83.12 VARICOSE VEINS OF BOTH LOWER EXTREMITIES WITH INFLAMMATION: ICD-10-CM

## 2024-02-13 PROBLEM — M25.561 RIGHT KNEE PAIN: Status: RESOLVED | Noted: 2023-12-26 | Resolved: 2024-02-13

## 2024-02-13 PROBLEM — S16.1XXA CERVICAL STRAIN: Status: RESOLVED | Noted: 2023-12-26 | Resolved: 2024-02-13

## 2024-02-13 PROBLEM — M79.10 MYALGIA: Chronic | Status: RESOLVED | Noted: 2023-05-03 | Resolved: 2024-02-13

## 2024-02-13 PROBLEM — Z09 HOSPITAL DISCHARGE FOLLOW-UP: Status: RESOLVED | Noted: 2023-12-26 | Resolved: 2024-02-13

## 2024-02-13 NOTE — PROGRESS NOTES
Subjective:   Jesenia Nelson is a 68 year old female who presents for a Medicare Subsequent Annual Wellness visit (Pt already had Initial Annual Wellness) and scheduled follow up of multiple significant but stable problems.   Has made diet change.     History/Other:   Fall Risk Assessment:   She has been screened for Falls and is low risk.      Cognitive Assessment:   She had a completely normal cognitive assessment - see flowsheet entries     Functional Ability/Status:   Jesenia Nelson has a completely normal functional assessment. See flowsheet for details.      Depression Screening (PHQ-2/PHQ-9): PHQ-2 SCORE: 1  , done 2/13/2024   Feeling down, depressed, or hopeless: 1    If you checked off any problems, how difficult have these problems made it for you to do your work, take care of things at home, or get along with other people?: Not difficult at all              Advanced Directives:   She does NOT have a Living Will. [Do you have a living will?: No]  She does NOT have a Power of  for Health Care. [Do you have a healthcare power of ?: No]  Not discussed      Patient Active Problem List   Diagnosis    Smokers' cough (HCC)    Type 2 diabetes mellitus without complication, without long-term current use of insulin (HCC)    Esophageal reflux    Essential hypertension    Thyroid disease    Mixed hyperlipidemia    Vitamin D deficiency    Varicose veins of lower extremity    Senile purpura (HCC)    Hand arthritis    Coccyx pain    Primary osteoarthritis of both knees     Allergies:  She is allergic to ibuprofen.    Current Medications:  Outpatient Medications Marked as Taking for the 2/13/24 encounter (Office Visit) with Sanam Haro MD   Medication Sig    clonazePAM 0.5 MG Oral Tab Take 1 tablet (0.5 mg total) by mouth nightly as needed.    baclofen 10 MG Oral Tab Take 1 tablet (10 mg total) by mouth daily as needed. For muscle stiffness    methylPREDNISolone (MEDROL) 4 MG Oral Tablet Therapy  Pack As directed. En la manana con comida    HYDROcodone-acetaminophen (NORCO) 5-325 MG Oral Tab Take 1 tablet by mouth every 6 (six) hours as needed for Pain.    fenofibrate 145 MG Oral Tab Take 1 tablet (145 mg total) by mouth nightly.    acetaminophen (TYLENOL EXTRA STRENGTH) 500 MG Oral Tab Take 1 tablet (500 mg total) by mouth every 6 (six) hours as needed for Pain.    FLUoxetine 10 MG Oral Cap 1 po every day for 7 days then 2 po every day    levothyroxine 25 MCG Oral Tab Take 1 tablet (25 mcg total) by mouth before breakfast.    FARXIGA 10 MG Oral Tab Take 1 tablet (10 mg total) by mouth daily.    JANUVIA 100 MG Oral Tab Take 1 tablet (100 mg total) by mouth daily.    metFORMIN HCl 1000 MG Oral Tab Take 1 tablet (1,000 mg total) by mouth 2 (two) times daily with meals.    PRAVASTATIN 10 MG Oral Tab TAKE 1 TABLET(10 MG) BY MOUTH EVERY NIGHT    ONETOUCH DELICA PLUS [id number] Does not apply Misc TEST ONCE DAILY    Glucose Blood (ONETOUCH VERIO) In Vitro Strip Test glucose once daily    lisinopril 20 MG Oral Tab Take 1 tablet (20 mg total) by mouth daily.    Fluocinonide 0.05 % External Solution APPLY TOPICALLY TO THE SCALP AT BEDTIME AS DIRECTED    OTEZLA 30 MG Oral Tab     ketoconazole 2 % External Shampoo Apply topically twice a week.    Fluocinolone Acetonide Scalp 0.01 % External Oil APPLY TO SCALP 3 NIGHTS PER WEEK AND OCCLUDE WITH SHOWERCAP . SHAMPOO IN THE MORNING    Acetaminophen 500 MG Oral Cap Take two (1000mg) by mouth three times daily.    Blood Glucose Monitoring Suppl w/Device Does not apply Kit Use as directed Dx E11.9 non-insulin    Multiple Vitamin (MULTI-VITAMIN DAILY OR) 1 TABLET DAILY       Medical History:  She  has a past medical history of Arthritis, Esophageal reflux, Essential hypertension, Thyroid disease, and Type 2 diabetes mellitus without complication, without long-term current use of insulin (formerly [facility]) (1/24/2020).  Surgical History:  She  has a past surgical history that includes knee  replacement surgery; cholecystectomy; and colonoscopy (2014).   Family History:  Her family history includes Breast Cancer in her maternal aunt; Other in her maternal aunt.  Social History:  She  reports that she has been smoking cigarettes. She has never been exposed to tobacco smoke. She has never used smokeless tobacco. She reports current alcohol use. She reports that she does not use drugs.    Tobacco:  She currently smokes tobacco.  Social History    Tobacco Use      Smoking status: Some Days        Types: Cigarettes        Passive exposure: Never      Smokeless tobacco: Never      Tobacco comments: 2 cig a week      Tobacco Cessation Documentation (Smoking and Smokeless included):  I had an in depth therapy session with Jesenia Nelson about her tobacco use risks and options using the USPSTF's Five A's approach:    Ask: Jesenia is using tobacco products.  Assess: We asked about/assessed behavioral health risk and factors affecting choice of behavior change goals/methods.  Specifically I asked about readiness to quit tobacco.  Advise: We gave clear, specific, and personalized behavior change advice, including information about personal health harms and benefits. Specifically, she was told that Quitting tobacco is one of the best things she can do for her health. I strongly encouraged her to quit.      Agree: We collaboratively selected appropriate treatment goals and methods based on the patient’s interest in and willingness to change the behavior.   Assist: We used behavior change techniques (self-help and/or counseling), to aid Jesenia in achieving agreed-upon goals by acquiring the skills, confidence, and social/environmental supports for behavior change, supplemented with adjunctive medical treatments when appropriate. Additionally, national quitting tobacco aides were discussed.   Arrange: Follow up at next visit- see specific follow up below.    Time Counseled: 5 minutes       CAGE Alcohol Screen:    CAGE screening score of 0 on 2/13/2024, showing low risk of alcohol abuse.      Patient Care Team:  Sanam Haro MD as PCP - General (Family Medicine)  Lukasz Talbert MD (Anesthesiology)    Review of Systems     Negative except per hpi     Objective:   Physical Exam  General appearance: alert  HEENT: Normocephalic, without obvious abnormality, atraumatic. PERRL, EOMI, pink conjunctiva.   Neck: supple, symmetrical, trachea midline, no adenopathy, no JVD and thyroid not enlarged,  Lungs: respirations unlabored, clear to auscultation bilaterally  Heart: regular rate and rhythm, S1, S2 normal, no murmur  Abdomen: normoactive bowel sounds x4; soft, non-distended; nontender; no masses  Extremities: extremities normal, atraumatic, no cyanosis or edema; no erythema or tenderness in calves bilaterally  Neurologic: alert, oriented x3      /70 (BP Location: Left arm, Patient Position: Sitting, Cuff Size: adult)   Pulse 64   Wt 166 lb (75.3 kg)   BMI 31.37 kg/m²  Estimated body mass index is 31.37 kg/m² as calculated from the following:    Height as of 12/1/23: 5' 1\" (1.549 m).    Weight as of this encounter: 166 lb (75.3 kg).    Medicare Hearing Assessment:   Hearing Screening    Time taken: 2/13/2024  1:38 PM  Screening Method: Questionnaire  I have a problem hearing over the telephone: No I have trouble following the conversations when two or more people are talking at the same time: No   I have trouble understanding things on the TV: No I have to strain to understand conversations: No   I have to worry about missing the telephone ring or doorbell: No I have trouble hearing conversations in a noisy background such as a crowded room or restaurant: No   I get confused about where sounds come from: No I misunderstand some words in a sentence and need to ask people to repeat themselves: No   I especially have trouble understanding the speech of women and children: No I have trouble understanding the speaker  in a large room such as at a meeting or place of Gnosticist: No   Many people I talk to seem to mumble (or don't speak clearly): No People get annoyed because I misunderstand what they say: No   I misunderstand what others are saying and make inappropriate responses: No I avoid social activities because I cannot hear well and fear I will reply improperly: No   Family members and friends have told me they think I may have hearing loss: No               Vision testing not required for subsequent Medicare annual exam      Assessment & Plan:   Jesenia Nelson is a 68 year old female who presents for a Medicare Assessment.   1. Encounter for annual health examination  -Medical, surgical and social history, as well as medications and allergies were reviewed with patient.    2. Type 2 diabetes mellitus without complication, without long-term current use of insulin ([facility])  - OPHTHALMOLOGY - INTERNAL    3. Smokers' cough ([facility])  - quit smoking Dec 2023      4. Senile purpura (HCC)  - Stable condition, continue present management.      5. Varicose veins of both lower extremities with inflammation    - Stable condition, continue present management.      6. Mixed hyperlipidemia    - Stable condition, continue present management.      7. Essential hypertension    - Stable condition, continue present management.      8. Vitamin D deficiency    - Stable condition, continue present management.      9. Thyroid disease    - Stable condition, continue present management.      10. Gastroesophageal reflux disease without esophagitis    - Stable condition, continue present management.      11. Primary osteoarthritis of both knees    - Stable condition, continue present management.      12. Hand arthritis    - Stable condition, continue present management.      13. Coccyx pain    - Stable condition, continue present management.      The patient indicates understanding of these issues and agrees to the plan.  Reinforced healthy diet, lifestyle,  and exercise.      No follow-ups on file.     Sanam Haro MD, 2/13/2024     Supplementary Documentation:   General Health:  In the past six months, have you lost more than 10 pounds without trying?: 2 - No  Has your appetite been poor?: No  Type of Diet: Low Carb  How does the patient maintain a good energy level?: Other  How would you describe your daily physical activity?: Light  How would you describe your current health state?: Fair  How do you maintain positive mental well-being?: Visiting Family  On a scale of 0 to 10, with 0 being no pain and 10 being severe pain, what is your pain level?: 8 - (Severe)  In the past six months, have you experienced urine leakage?: 0-No  At any time do you feel concerned for the safety/well-being of yourself and/or your children, in your home or elsewhere?: No  Have you had any immunizations at another office such as Influenza, Hepatitis B, Tetanus, or Pneumococcal?: No       Jesenia Nelson's SCREENING SCHEDULE   Tests on this list are recommended by your physician but may not be covered, or covered at this frequency, by your insurer.   Please check with your insurance carrier before scheduling to verify coverage.   PREVENTATIVE SERVICES FREQUENCY &  COVERAGE DETAILS LAST COMPLETION DATE   Diabetes Screening    Fasting Blood Sugar /  Glucose    One screening every 12 months if never tested or if previously tested but not diagnosed with pre-diabetes   One screening every 6 months if diagnosed with pre-diabetes Lab Results   Component Value Date    GLU 84 02/08/2024        Cardiovascular Disease Screening    Lipid Panel  Cholesterol  Lipoprotein (HDL)  Triglycerides Covered every 5 years for all Medicare beneficiaries without apparent signs or symptoms of cardiovascular disease Lab Results   Component Value Date    CHOLEST 129 02/08/2024    HDL 53 02/08/2024    LDL 60 02/08/2024    TRIG 85 02/08/2024         Electrocardiogram (EKG)   Covered if needed at Welcome to  Medicare, and non-screening if indicated for medical reasons 06/16/2021      Ultrasound Screening for Abdominal Aortic Aneurysm (AAA) Covered once in a lifetime for one of the following risk factors    Men who are 65-75 years old and have ever smoked    Anyone with a family history -     Colorectal Cancer Screening  Covered for ages 50-85; only need ONE of the following:    Colonoscopy   Covered every 10 years    Covered every 2 years if patient is at high risk or previous colonoscopy was abnormal 02/16/2023    Health Maintenance   Topic Date Due    Colorectal Cancer Screening  02/16/2026       Flexible Sigmoidoscopy   Covered every 4 years -    Fecal Occult Blood Test Covered annually -   Bone Density Screening    Bone density screening    Covered every 2 years after age 65 if diagnosed with risk of osteoporosis or estrogen deficiency.    Covered yearly for long-term glucocorticoid medication use (Steroids) Last Dexa Scan:    XR DEXA BONE DENSITOMETRY (CPT=77080) 03/17/2020      No recommendations at this time   Pap and Pelvic    Pap   Covered every 2 years for women at normal risk; Annually if at high risk 02/12/2020  No recommendations at this time    Chlamydia Annually if high risk -  No recommendations at this time   Screening Mammogram    Mammogram     Recommend annually for all female patients aged 40 and older    One baseline mammogram covered for patients aged 35-39 10/30/2023    Health Maintenance   Topic Date Due    Mammogram  10/30/2024       Immunizations    Influenza Covered once per flu season  Please get every year 10/17/2023  No recommendations at this time    Pneumococcal Each vaccine (Hanbfiq21 & Igcjvybra83) covered once after 65 Prevnar 13: -    [id number]: 09/07/2016     No recommendations at this time    Hepatitis B One screening covered for patients with certain risk factors   -  No recommendations at this time    Tetanus Toxoid Not covered by Medicare Part B unless medically necessary (cut  with metal); may be covered with your pharmacy prescription benefits -    Tetanus, Diptheria and Pertusis TD and TDaP Not covered by Medicare Part B -  No recommendations at this time    Zoster Not covered by Medicare Part B; may be covered with your pharmacy  prescription benefits -  Zoster Vaccines(1 of 2) Never done     Diabetes      Hemoglobin A1C Annually; if last result is elevated, may be asked to retest more frequently.    Medicare covers every 3 months Lab Results   Component Value Date     02/08/2024    A1C 5.7 (H) 02/08/2024       No recommendations at this time    Creat/alb ratio Annually Lab Results   Component Value Date    MICROALBCREA 3.7 02/08/2024       LDL Annually Lab Results   Component Value Date    LDL 60 02/08/2024       Dilated Eye Exam Annually Last Diabetic Eye Exam:  No data recorded  No data recorded       Annual Monitoring of Persistent Medications (ACE/ARB, digoxin diuretics, anticonvulsants)    Potassium Annually Lab Results   Component Value Date    K 3.8 02/08/2024         Creatinine   Annually Lab Results   Component Value Date    CREATSERUM 0.79 02/08/2024         BUN Annually Lab Results   Component Value Date    BUN 17 02/08/2024       Drug Serum Conc Annually No results found for: \"DIGOXIN\", \"DIG\", \"VALP\"           Chronic Obstructive Pulmonary Disease (COPD)    Spirometry Annually Spirometry date:

## 2024-02-29 DIAGNOSIS — E11.9 TYPE 2 DIABETES MELLITUS WITHOUT COMPLICATION, WITHOUT LONG-TERM CURRENT USE OF INSULIN (HCC): ICD-10-CM

## 2024-03-01 RX ORDER — LISINOPRIL 20 MG/1
20 TABLET ORAL DAILY
Qty: 90 TABLET | Refills: 3 | Status: SHIPPED | OUTPATIENT
Start: 2024-03-01

## 2024-03-01 NOTE — TELEPHONE ENCOUNTER
Refill passed per [facility] protocol.  Requested Prescriptions   Pending Prescriptions Disp Refills    LISINOPRIL 20 MG Oral Tab [Pharmacy Med Name: LISINOPRIL 20MG TABLETS] 90 tablet 3     Sig: TAKE 1 TABLET(20 MG) BY MOUTH DAILY       Hypertension Medications Protocol Passed - 2/29/2024 12:32 PM        Passed - CMP or BMP in past 12 months        Passed - Last BP reading less than 140/90     BP Readings from Last 1 Encounters:   02/13/24 112/70               Passed - In person appointment or virtual visit in the past 12 mos or appointment in next 3 mos     Recent Outpatient Visits              [facility]    Office Visit    3 weeks ago Neck pain    [facility]    Office Visit    2 months ago Hospital discharge follow-up    [facility] [place] [facility] [facility] Patrick, APRN    Office Visit    3 months ago Other depression    [facility]    Office Visit    4 months ago Type 2 diabetes mellitus without complication, without long-term current use of insulin (HCC)    [facility]    Office Visit                      Passed - EGFRCR or GFRNAA > 50     GFR Evaluation  EGFRCR: 81 , resulted on 2/8/2024             Recent Outpatient Visits              2 weeks ago Encounter for annual health examination    [facility]    Office Visit    3 weeks ago Neck pain    [facility]    Office Visit    2 months ago Hospital discharge follow-up    [facility] Patrick, APR[facility]    Office Visit    3 months ago Other depression    [facility]  [facility], [facility] Denise Sanders Tanja, MD    Office Visit    4 months ago Type 2 diabetes mellitus without complication, without long-term current use of insulin (HCC)    [facility], [facility], Sanam Barfield MD    Office Visit

## 2024-03-15 ENCOUNTER — NURSE TRIAGE (OUTPATIENT)
Dept: FAMILY MEDICINE CLINIC | Facility: CLINIC | Age: 69
End: 2024-03-15

## 2024-03-15 NOTE — TELEPHONE ENCOUNTER
Patient c/o back pain for several weeks. She just came back from [place]. She has Daily mid back pain.    Pain comes in goes. She is unable to sleep. Tossing and turning.  She has been taking OTC pain relievers and symptoms do not improve.     Patient needs evaluation.  No appts available in our fm/IM group. Patient will go to Immediate care for evaluation.      Reason for Disposition   Age > 50 and no history of prior similar back pain    Protocols used: Back Pain-A-[place]

## 2024-03-16 ENCOUNTER — APPOINTMENT (OUTPATIENT)
Dept: GENERAL RADIOLOGY | Facility: HOSPITAL | Age: 69
End: 2024-03-16
Attending: EMERGENCY MEDICINE
Payer: MEDICARE

## 2024-03-16 ENCOUNTER — HOSPITAL ENCOUNTER (EMERGENCY)
Facility: HOSPITAL | Age: 69
Discharge: HOME | End: 2024-03-16
Attending: EMERGENCY MEDICINE
Payer: MEDICARE

## 2024-03-16 VITALS
DIASTOLIC BLOOD PRESSURE: 77 MMHG | OXYGEN SATURATION: 99 % | TEMPERATURE: 98 F | SYSTOLIC BLOOD PRESSURE: 118 MMHG | HEART RATE: 70 BPM | RESPIRATION RATE: 16 BRPM

## 2024-03-16 DIAGNOSIS — S46.811A STRAIN OF RIGHT TRAPEZIUS MUSCLE, INITIAL ENCOUNTER: Primary | ICD-10-CM

## 2024-03-16 PROCEDURE — 99284 EMERGENCY DEPT VISIT MOD MDM: CPT

## 2024-03-16 PROCEDURE — 71046 X-RAY EXAM CHEST 2 VIEWS: CPT | Performed by: EMERGENCY MEDICINE

## 2024-03-16 PROCEDURE — 96372 THER/PROPH/DIAG INJ SC/IM: CPT

## 2024-03-16 PROCEDURE — 99283 EMERGENCY DEPT VISIT LOW MDM: CPT

## 2024-03-16 RX ORDER — CYCLOBENZAPRINE HCL 10 MG
10 TABLET ORAL NIGHTLY
Qty: 4 TABLET | Refills: 0 | Status: SHIPPED | OUTPATIENT
Start: 2024-03-16 | End: 2024-03-20

## 2024-03-16 RX ORDER — MELOXICAM 7.5 MG/1
7.5 TABLET ORAL DAILY
Qty: 7 TABLET | Refills: 0 | Status: SHIPPED | OUTPATIENT
Start: 2024-03-16 | End: 2024-03-23

## 2024-03-16 RX ORDER — KETOROLAC TROMETHAMINE 30 MG/ML
30 INJECTION, SOLUTION INTRAMUSCULAR; INTRAVENOUS ONCE
Status: COMPLETED | OUTPATIENT
Start: 2024-03-16 | End: 2024-03-16

## 2024-03-17 NOTE — ED PROVIDER NOTES
Patient Seen in: [facility] Emergency Department      History     Chief Complaint   Patient presents with    Back Pain     Stated Complaint: back pain    Subjective:   HPI    Patient is a 68-year-old female who complains of pain to her left upper back.  She states she was in [place] about a week ago and hurt her there.  Since she has been home she states it comes and goes.  She states when she wakes and moves quickly it really hurts.  Hurts when she raises her left arm.  No cough or shortness of breath no pleuritic pain.    Objective:   Past Medical History:   Diagnosis Date    Arthritis     Esophageal reflux     Essential hypertension     Thyroid disease     Type 2 diabetes mellitus without complication, without long-term current use of insulin ([facility]) 1/24/2020              Past Surgical History:   Procedure Laterality Date    CHOLECYSTECTOMY      COLONOSCOPY  2014    KNEE REPLACEMENT SURGERY      bilateral knee replacement                Social History     Socioeconomic History    Marital status:    Tobacco Use    Smoking status: Former     Types: Cigarettes     Passive exposure: Never    Smokeless tobacco: Never    Tobacco comments:     2 cig a week    Vaping Use    Vaping Use: Never used   Substance and Sexual Activity    Alcohol use: Yes     Comment: Occasionally     Drug use: Never              Review of Systems    Positive for stated complaint: back pain  Other systems are as noted in HPI.  Constitutional and vital signs reviewed.      All other systems reviewed and negative except as noted above.    Physical Exam     ED Triage Vitals   BP 03/16/24 1921 123/81   Pulse 03/16/24 1920 68   Resp 03/16/24 1920 16   Temp 03/16/24 1920 97.5 °F (36.4 °C)   Temp src --    SpO2 03/16/24 1920 99 %   O2 Device --        Current:/81   Pulse 68   Temp 97.5 °F (36.4 °C)   Resp 16   SpO2 99%         Physical Exam    Constitutional: Oriented to person, place, and time. Appears well-developed and  well-nourished.   HEENT:   Head: Normocephalic and atraumatic.   Right Ear: External ear normal.   Left Ear: External ear normal.   Nose: Nose normal.   Mouth/Throat: Oropharynx is clear and moist.   Eyes: Conjunctivae and EOM are normal. Pupils are equal, round, and reactive to light.   Neck: No midline tenderness patient has tenderness about her left trapezius area.  She has pain with range of motion of her left arm.  Cardiovascular: Normal rate, regular rhythm, normal heart sounds and intact distal pulses.    Pulmonary/Chest: Effort normal and breath sounds normal. No respiratory distress.   Abdominal: Soft. Bowel sounds are normal. Exhibits no distension and no mass. There is no tenderness. There is no rebound and no guarding.   Musculoskeletal: Normal range of motion. Exhibits no edema or tenderness.   Lymphadenopathy: No cervical adenopathy.   Neurological: Alert and oriented to person, place, and time. Normal reflexes. No cranial nerve deficit. No motor os sensory defecits noted Coordination normal.   Skin: Skin is warm and dry.   Psychiatric: Normal mood and affect. Behavior is normal. Judgment and thought content normal.   Nursing note and vitals reviewed.        ED Course   Labs Reviewed - No data to display                   MDM      Use of independent historian:     I personally reviewed and interpreted the images : No infiltrate seen    XR CHEST PA + LAT CHEST (CPT=71046)    Result Date: 3/16/2024  CONCLUSION:  Negative for radiographically evident acute intrathoracic process.    Dictated by (CST): Nato Figueroa MD on 3/16/2024 at 8:09 PM     Finalized by (CST): Nato Figueroa MD on 3/16/2024 at 8:10 PM           Vitals:    03/16/24 1920 03/16/24 1921   BP:  123/81   Pulse: 68    Resp: 16    Temp: 97.5 °F (36.4 °C)    SpO2: 99%      *I personally reviewed and interpreted all ED vitals.    Pulse Ox: 99%, Room air, Normal         Differential Diagnosis/ Diagnostic Considerations: Patient with left  upper back pain and trapezius area tenderness.  Likely muscle strain.  Will check x-ray consider pneumonia consider pneumothorax no signs of radicular symptoms.    Medical Record Review: I personally reviewed available prior medical records for any recent pertinent discharge summaries, testing, and procedures and reviewed those reports and found nurse triage from yesterday that recommended immediate care evaluation..    Complicating Factors: The patient already has type 2 diabetes thyroid disease hypertension and reflux.  Which contribute to the complexity of this ED evaluation.    Social determinants of health:    Prescription drug management:      Shared Decision Making:    ED Course: Patient feels better after Toradol.  Will discharge home she request a muscle relaxer will prescribe for Flexeril tablets for her to take nightly.    Discussion of management with other healthcare providers:    Condition upon leaving the department: Stable                                     Medical Decision Making      Disposition and Plan     Clinical Impression:  1. Strain of right trapezius muscle, initial encounter         Disposition:  Discharge  3/16/2024  8:22 pm    Follow-up:  Sanam Haro MD  [address] [place] [address]  [address]  [place] [address]  [phone]    Schedule an appointment as soon as possible for a visit in 2 day(s)      We recommend that you schedule follow up care with a primary care provider within the next three months to obtain basic health screening including reassessment of your blood pressure.      Medications Prescribed:  Current Discharge Medication List        START taking these medications    Details   Meloxicam 7.5 MG Oral Tab Take 1 tablet (7.5 mg total) by mouth daily for 7 days.  Qty: 7 tablet, Refills: 0

## 2024-03-18 ENCOUNTER — PATIENT OUTREACH (OUTPATIENT)
Dept: CASE MANAGEMENT | Age: 69
End: 2024-03-18

## 2024-03-18 NOTE — PROGRESS NOTES
1st attempt ER f/up apt request    Clarke Alexander NP  PCP  [address] [facility] [place]  [address]  [facility] [address]  Apt: March 21 @5:20pm    Confirmed w/ pt  Closing encounter

## 2024-03-28 ENCOUNTER — TELEPHONE (OUTPATIENT)
Dept: FAMILY MEDICINE CLINIC | Facility: CLINIC | Age: 69
End: 2024-03-28

## 2024-03-28 NOTE — TELEPHONE ENCOUNTER
Would have her take ibuprofen 600-800 mg with food for trapezius pain. If that's not helpful can place referrals. Ok to add on as virtual tomorrow at 11:45 or 1p
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
## 2024-03-29 ENCOUNTER — TELEMEDICINE (OUTPATIENT)
Dept: FAMILY MEDICINE CLINIC | Facility: CLINIC | Age: 69
End: 2024-03-29

## 2024-03-29 DIAGNOSIS — M54.2 NECK PAIN: ICD-10-CM

## 2024-03-29 DIAGNOSIS — R25.2 MUSCLE CRAMP: ICD-10-CM

## 2024-03-29 DIAGNOSIS — M54.6 ACUTE RIGHT-SIDED THORACIC BACK PAIN: Primary | ICD-10-CM

## 2024-03-29 PROCEDURE — 99214 OFFICE O/P EST MOD 30 MIN: CPT | Performed by: FAMILY MEDICINE

## 2024-03-29 PROCEDURE — 1159F MED LIST DOCD IN RCRD: CPT | Performed by: FAMILY MEDICINE

## 2024-03-29 PROCEDURE — 1160F RVW MEDS BY RX/DR IN RCRD: CPT | Performed by: FAMILY MEDICINE

## 2024-03-29 RX ORDER — LIDOCAINE 50 MG/G
1 PATCH TOPICAL EVERY 24 HOURS
Qty: 15 PATCH | Refills: 0 | Status: SHIPPED | OUTPATIENT
Start: 2024-03-29

## 2024-03-29 RX ORDER — HYDROCODONE BITARTRATE AND ACETAMINOPHEN 5; 325 MG/1; MG/1
1 TABLET ORAL EVERY 6 HOURS PRN
Qty: 20 TABLET | Refills: 0 | Status: SHIPPED | OUTPATIENT
Start: 2024-03-29

## 2024-03-29 RX ORDER — BACLOFEN 10 MG/1
10 TABLET ORAL DAILY PRN
Qty: 60 TABLET | Refills: 1 | Status: SHIPPED | OUTPATIENT
Start: 2024-03-29

## 2024-03-29 NOTE — TELEPHONE ENCOUNTER
Patient's daughter Lissette contacted, ok per AAKASH (name and  of patient verified). Dr. Haro's message reviewed. Daughter verbalizes understanding; denies further questions and agrees with plan of care.  Video Visit scheduled:  Future Appointments   Date Time Provider Department Center   3/29/2024 11:45 AM [facility]
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
## 2024-04-26 DIAGNOSIS — E11.9 TYPE 2 DIABETES MELLITUS WITHOUT COMPLICATION, WITHOUT LONG-TERM CURRENT USE OF INSULIN (HCC): Chronic | ICD-10-CM

## 2024-04-29 RX ORDER — BLOOD SUGAR DIAGNOSTIC
STRIP MISCELLANEOUS
Qty: 100 STRIP | Refills: 3 | Status: SHIPPED | OUTPATIENT
Start: 2024-04-29

## 2024-04-29 NOTE — TELEPHONE ENCOUNTER
REFILL PASSED PER [facility] PROTOCOLS    Requested Prescriptions   Pending Prescriptions Disp Refills    Glucose Blood (ONETOUCH VERIO) In Vitro Strip 100 strip 3     Sig: Test glucose once daily       Diabetic Supplies Protocol Passed - 4/26/2024  1:36 PM        Passed - In person appointment or virtual visit in the past 12 mos or appointment in next 3 mos     Recent Outpatient Visits              1 month ago Acute right-sided thoracic back pain    [facility]    Telemedicine    2 months ago Encounter for annual health examination    [facility]    Office Visit    2 months ago [facility]    [facility] Visit    4 months ago Hospital discharge follow-up    [facility]    Office Visit    5 months ago Other depression    [facility]                             Recent Outpatient Visits              1 month ago Acute right-sided thoracic back pain    [facility]    Telemedicine    2 months ago Encounter for annual health examination    [facility]    Office Visit    2 months ago Neck pain    [facility]    Office Visit    4 months ago Hospital discharge follow-up    [facility]    Office Visit    5 months ago Other depression    [facility]    [facility] Visit

## 2024-05-23 NOTE — PROGRESS NOTES
Subjective:   Jesenia Nelson is a 68 year old female who presents for Varicose Veins (Swollen, hard time to walk, had an operation of varicose veins 12 yrs ago, Left leg the veins are sticking out more than the right leg,)   Patient is a pleasant 68-year-old female past medical history consistent for osteoarthritis, reflux, hypertension, thyroid disease, DM2, hyperlipidemia, and vitamin D deficiency.  Patient presents to office for evaluation of swollen varicose veins.  Patient states via [employer] # [id number] she follows up for varicose veins today. She is having pain and having trouble walking. She had surgery for veins year ago. She has tried to elevate her legs at home and has a ball. Educated on exercises to     Has aching in her back. She spoke with Dr Haro in the past for this. She was given and RX for diclofenac and xrays were ordered with a referral to physiatry. She has not obtained the pain patches due to cost. She has not obtained the xray. She has not schedule the evaluation with physiatry. Requesting hydrocordone. Encouraged patient to schedule with physiatry and obtain xrays. Order place for topical diclofenac.     Needs refill of clonazepam for sleep.         Past Medical History:    Arthritis    Esophageal reflux    Essential hypertension    Thyroid disease    Type 2 diabetes mellitus without complication, without long-term current use of insulin (HCC)      Past Surgical History:   Procedure Laterality Date    Cholecystectomy      Colonoscopy  2014    Knee replacement surgery      bilateral knee replacement        History/Other:    Chief Complaint Reviewed and Verified  Nursing Notes Reviewed and   Verified  Tobacco Reviewed  Allergies Reviewed  Medications Reviewed    Problem List Reviewed  Medical History Reviewed  Surgical History   Reviewed  OB Status Reviewed  Family History Reviewed  Social History   Reviewed         Tobacco:  She smoked tobacco in the past but quit greater than 12  months ago.  Social History     Tobacco Use   Smoking Status Former    Types: Cigarettes    Passive exposure: Never   Smokeless Tobacco Never   Tobacco Comments    2 cig a week         Current Outpatient Medications   Medication Sig Dispense Refill    clonazePAM 0.5 MG Oral Tab Take 1 tablet (0.5 mg total) by mouth nightly as needed. 30 tablet 0    diclofenac 1 % External Gel Apply 2 g topically 4 (four) times daily as needed. 100 g 3    Glucose Blood (ONETOUCH VERIO) In Vitro Strip Test glucose once daily 100 strip 3    HYDROcodone-acetaminophen (NORCO) 5-325 MG Oral Tab Take 1 tablet by mouth every 6 (six) hours as needed for Pain. 20 tablet 0    baclofen 10 MG Oral Tab Take 1 tablet (10 mg total) by mouth daily as needed. For muscle stiffness 60 tablet 1    lisinopril 20 MG Oral Tab Take 1 tablet (20 mg total) by mouth daily. 90 tablet 3    fenofibrate 145 MG Oral Tab Take 1 tablet (145 mg total) by mouth nightly. 90 tablet 3    FLUoxetine 10 MG Oral Cap 1 po every day for 7 days then 2 po every day 60 capsule 2    levothyroxine 25 MCG Oral Tab Take 1 tablet (25 mcg total) by mouth before breakfast. 90 tablet 3    FARXIGA 10 MG Oral Tab Take 1 tablet (10 mg total) by mouth daily. 90 tablet 3    JANUVIA 100 MG Oral Tab Take 1 tablet (100 mg total) by mouth daily. 90 tablet 3    metFORMIN HCl 1000 MG Oral Tab Take 1 tablet (1,000 mg total) by mouth 2 (two) times daily with meals. 180 tablet 3    PRAVASTATIN 10 MG Oral Tab TAKE 1 TABLET(10 MG) BY MOUTH EVERY NIGHT 90 tablet 3    ONETOUCH DELICA PLUS [id number] Does not apply Misc TEST ONCE DAILY 100 each 3    Fluocinonide 0.05 % External Solution APPLY TOPICALLY TO THE SCALP AT BEDTIME AS DIRECTED      ketoconazole 2 % External Shampoo Apply topically twice a week.      Fluocinolone Acetonide Scalp 0.01 % External Oil APPLY TO SCALP 3 NIGHTS PER WEEK AND OCCLUDE WITH SHOWERCAP . SHAMPOO IN THE MORNING      Blood Glucose Monitoring Suppl w/Device Does not apply Kit  Use as directed Dx E11.9 non-insulin 1 kit 0    Multiple Vitamin (MULTI-VITAMIN DAILY OR) 1 TABLET DAILY      methylPREDNISolone (MEDROL) 4 MG Oral Tablet Therapy Pack As directed. En la manana con comida (Patient not taking: Reported on 5/24/2024) 1 each 0    acetaminophen (TYLENOL EXTRA STRENGTH) 500 MG Oral Tab Take 1 tablet (500 mg total) by mouth every 6 (six) hours as needed for Pain. (Patient not taking: Reported on 5/24/2024) 30 tablet 0    OTEZLA 30 MG Oral Tab  (Patient not taking: Reported on 5/24/2024)      Acetaminophen 500 MG Oral Cap Take two (1000mg) by mouth three times daily. (Patient not taking: Reported on 5/24/2024) 100 capsule 0         Review of Systems:  Review of Systems   Constitutional: Negative.  Negative for activity change and appetite change.   HENT: Negative.  Negative for congestion, postnasal drip, rhinorrhea, sore throat, tinnitus and voice change.    Eyes: Negative.    Respiratory: Negative.  Negative for apnea, cough, chest tightness and shortness of breath.    Cardiovascular:  Positive for leg swelling. Negative for chest pain.   Gastrointestinal: Negative.  Negative for abdominal pain, anal bleeding and blood in stool.   Genitourinary: Negative.  Negative for difficulty urinating, flank pain and menstrual problem.   Musculoskeletal:  Positive for arthralgias, back pain and neck pain. Negative for joint swelling.   Skin: Negative.    Neurological: Negative.  Negative for dizziness and headaches.   Psychiatric/Behavioral:  Positive for sleep disturbance. Negative for agitation.          Objective:   /65 (BP Location: Right arm, Patient Position: Sitting, Cuff Size: adult)   Pulse 65   Ht 5' 2\" (1.575 m)   Wt 166 lb 12.8 oz (75.7 kg)   BMI 30.51 kg/m²  Estimated body mass index is 30.51 kg/m² as calculated from the following:    Height as of this encounter: 5' 2\" (1.575 m).    Weight as of this encounter: 166 lb 12.8 oz (75.7 kg).  Physical Exam  Vitals and nursing note  reviewed.   Constitutional:       General: She is not in acute distress.     Appearance: Normal appearance. She is well-developed and normal weight.   HENT:      Head: Normocephalic and atraumatic.      Right Ear: External ear normal.      Left Ear: External ear normal.   Eyes:      Conjunctiva/sclera: Conjunctivae normal.      Pupils: Pupils are equal, round, and reactive to light.   Neck:      Thyroid: No thyromegaly.   Cardiovascular:      Rate and Rhythm: Normal rate and regular rhythm.      Pulses: Normal pulses.      Heart sounds: Normal heart sounds. No murmur heard.     Comments: Large varicosities posterior left leg  Pain and aching with walking, feels heavy  Pulmonary:      Effort: Pulmonary effort is normal. No respiratory distress.      Breath sounds: Normal breath sounds. No wheezing or rales.   Chest:      Chest wall: No tenderness.   Abdominal:      General: Bowel sounds are normal. There is no distension.      Palpations: Abdomen is soft.      Tenderness: There is no abdominal tenderness.   Musculoskeletal:         General: No tenderness. Normal range of motion.      Cervical back: Normal range of motion and neck supple.      Right lower le+ Edema present.      Left lower le+ Edema present.      Comments: Tightness in back with pain.    Lymphadenopathy:      Cervical: No cervical adenopathy.   Skin:     General: Skin is warm and dry.      Capillary Refill: Capillary refill takes less than 2 seconds.      Findings: No rash.   Neurological:      Mental Status: She is alert and oriented to person, place, and time.      Coordination: Coordination normal.   Psychiatric:         Behavior: Behavior normal.         Thought Content: Thought content normal.         Judgment: Judgment normal.         Assessment & Plan:   1. Varicose veins of both lower extremities with pain (Primary)  Assessment & Plan:  Patient having swollen varicosities in left leg  Had vein surgery years ago  Pains are returning now  with pain aching and heaviness in the legs  Would like to go back and see vascular again  Referral placed to vein clinic per patient's request  Orders:  -     SPECIALTY (OTHER) - EXTERNAL  2. Neck pain  Assessment & Plan:  Continues to have pain into the neck.  MD Haro saw in mid March.  Wrote order for x-ray and for physiatry referral  Exam remains the same patient was unable to obtain lidocaine patches due to cost reports diclofenac oral minimally helped.  Trial of Biofreeze, diclofenac gel  Encouraged to obtain x-ray today  Encouraged schedule referral to physiatry today.  Orders:  -     Diclofenac Sodium; Apply 2 g topically 4 (four) times daily as needed.  Dispense: 100 g; Refill: 3  3. Acute right-sided thoracic back pain  Assessment & Plan:  Continues to have right-sided thoracic back pain into the neck.  MD Haro saw in mid March.  Wrote order for x-ray and for physiatry referral  Exam remains the same patient was unable to obtain lidocaine patches due to cost reports diclofenac oral minimally helped.  Encouraged to obtain x-ray today  Encouraged schedule referral to physiatry today.  4. Sleep disorder  Assessment & Plan:  Patient continues to have sleep disturbance.  Currently being treated with clonazepam half tab nightly as needed for sleep  Refill provided  Orders:  -     clonazePAM; Take 1 tablet (0.5 mg total) by mouth nightly as needed.  Dispense: 30 tablet; Refill: 0        Return if symptoms worsen or fail to improve.    KATHERINE Rodriguez, 5/23/2024, 12:24 PM

## 2024-05-24 ENCOUNTER — HOSPITAL ENCOUNTER (OUTPATIENT)
Dept: GENERAL RADIOLOGY | Age: 69
Discharge: HOME | End: 2024-05-24
Attending: FAMILY MEDICINE

## 2024-05-24 ENCOUNTER — OFFICE VISIT (OUTPATIENT)
Dept: FAMILY MEDICINE CLINIC | Facility: CLINIC | Age: 69
End: 2024-05-24

## 2024-05-24 VITALS
HEIGHT: 62 IN | WEIGHT: 166.81 LBS | SYSTOLIC BLOOD PRESSURE: 102 MMHG | DIASTOLIC BLOOD PRESSURE: 65 MMHG | HEART RATE: 65 BPM | BODY MASS INDEX: 30.69 KG/M2

## 2024-05-24 DIAGNOSIS — M54.6 ACUTE RIGHT-SIDED THORACIC BACK PAIN: ICD-10-CM

## 2024-05-24 DIAGNOSIS — M54.2 NECK PAIN: ICD-10-CM

## 2024-05-24 DIAGNOSIS — G47.9 SLEEP DISORDER: ICD-10-CM

## 2024-05-24 DIAGNOSIS — I83.813 VARICOSE VEINS OF BOTH LOWER EXTREMITIES WITH PAIN: Primary | ICD-10-CM

## 2024-05-24 PROCEDURE — 3008F BODY MASS INDEX DOCD: CPT

## 2024-05-24 PROCEDURE — 1126F AMNT PAIN NOTED NONE PRSNT: CPT

## 2024-05-24 PROCEDURE — 3078F DIAST BP <80 MM HG: CPT

## 2024-05-24 PROCEDURE — 99213 OFFICE O/P EST LOW 20 MIN: CPT

## 2024-05-24 PROCEDURE — 3044F HG A1C LEVEL LT 7.0%: CPT

## 2024-05-24 PROCEDURE — 3074F SYST BP LT 130 MM HG: CPT

## 2024-05-24 PROCEDURE — 72050 X-RAY EXAM NECK SPINE 4/5VWS: CPT | Performed by: FAMILY MEDICINE

## 2024-05-24 PROCEDURE — 1159F MED LIST DOCD IN RCRD: CPT

## 2024-05-24 PROCEDURE — 1160F RVW MEDS BY RX/DR IN RCRD: CPT

## 2024-05-24 RX ORDER — CLONAZEPAM 0.5 MG/1
0.5 TABLET ORAL NIGHTLY PRN
Qty: 30 TABLET | Refills: 0 | Status: SHIPPED | OUTPATIENT
Start: 2024-05-24

## 2024-05-24 NOTE — ASSESSMENT & PLAN NOTE
Patient having swollen varicosities in left leg  Had vein surgery years ago  Pains are returning now with pain aching and heaviness in the legs  Would like to go back and see vascular again  Referral placed to vein clinic per patient's request

## 2024-05-24 NOTE — ASSESSMENT & PLAN NOTE
Continues to have right-sided thoracic back pain into the neck.  MD Haro saw in mid March.  Wrote order for x-ray and for physiatry referral  Exam remains the same patient was unable to obtain lidocaine patches due to cost reports diclofenac oral minimally helped.  Encouraged to obtain x-ray today  Encouraged schedule referral to physiatry today.

## 2024-05-24 NOTE — ASSESSMENT & PLAN NOTE
Patient continues to have sleep disturbance.  Currently being treated with clonazepam half tab nightly as needed for sleep  Refill provided

## 2024-05-24 NOTE — ASSESSMENT & PLAN NOTE
Continues to have pain into the neck.  MD Haro saw in mid March.  Wrote order for x-ray and for physiatry referral  Exam remains the same patient was unable to obtain lidocaine patches due to cost reports diclofenac oral minimally helped.  Trial of Biofreeze, diclofenac gel  Encouraged to obtain x-ray today  Encouraged schedule referral to physiatry today.

## 2024-06-07 ENCOUNTER — TELEPHONE (OUTPATIENT)
Dept: FAMILY MEDICINE CLINIC | Facility: CLINIC | Age: 69
End: 2024-06-07

## 2024-06-07 NOTE — TELEPHONE ENCOUNTER
[see result notes and Corn[employer]et message sent to patient -->XR Cervical Spine from 5/24/24]    Patient contacted in Macedonian and made aware of Dr. Haro's interpretation and recommendations. She has not scheduled the visit with Dr. Behar; She was transferred to Dr. Behar's office to schedule the advised visit. Patient verbalized understanding. No further questions or concerns at this time.

## 2024-06-07 NOTE — TELEPHONE ENCOUNTER
Received phone call from patient- patient disconnected phone call.    With [employer] Language Line  Erica ID# [id number]    Attempted to reach patient- unable to leave voicemail as mailbox full.

## 2024-06-17 ENCOUNTER — NURSE TRIAGE (OUTPATIENT)
Dept: FAMILY MEDICINE CLINIC | Facility: CLINIC | Age: 69
End: 2024-06-17

## 2024-06-17 NOTE — TELEPHONE ENCOUNTER
Action Requested: Summary for Provider     []  Critical Lab, Recommendations Needed  [x] Need Additional Advice  []   FYI    []   Need Orders  [] Need Medications Sent to Pharmacy  []  Other     SUMMARY: Patient requesting an appointment for today or tomorrow with Dr. Haro. Complaining of Burning with urination 6/10, discomfort with urination, urgency, frequency, urine dark in color and back discomfort    Reason for call: Urinary Symptoms  Onset: this morning      Denies fever, blood in the urine    Discussed Home Care Advice with patient's daughter; verbalized understanding and agreed.    Reason for Disposition   Age > 50 years    Protocols used: Urination Pain - Female-A-[place]

## 2024-06-17 NOTE — TELEPHONE ENCOUNTER
Spoke with Dr. Haro and ok to double book at 10 am     Spoke with patient's daughter, Lissette and informed of Dr. Haro's message.  Mynor thanked Dr. Haro and stated that patient will be there a little early.  Was informed that this was a double book and she may be waiting.  Lissette verbalized understanding and was ok with that.    Future Appointments   Date Time Provider Department Center   6/18/2024 10:00 AM Sanam Haro MD [facility]

## 2024-06-18 ENCOUNTER — OFFICE VISIT (OUTPATIENT)
Dept: FAMILY MEDICINE CLINIC | Facility: CLINIC | Age: 69
End: 2024-06-18

## 2024-06-18 VITALS
HEART RATE: 67 BPM | BODY MASS INDEX: 30 KG/M2 | DIASTOLIC BLOOD PRESSURE: 65 MMHG | SYSTOLIC BLOOD PRESSURE: 99 MMHG | WEIGHT: 165 LBS

## 2024-06-18 DIAGNOSIS — M54.2 NECK PAIN: ICD-10-CM

## 2024-06-18 DIAGNOSIS — B35.1 ONYCHOMYCOSIS: ICD-10-CM

## 2024-06-18 DIAGNOSIS — E11.9 TYPE 2 DIABETES MELLITUS WITHOUT COMPLICATION, WITHOUT LONG-TERM CURRENT USE OF INSULIN (HCC): Chronic | ICD-10-CM

## 2024-06-18 DIAGNOSIS — N76.0 ACUTE VAGINITIS: ICD-10-CM

## 2024-06-18 DIAGNOSIS — N30.00 ACUTE CYSTITIS WITHOUT HEMATURIA: Primary | ICD-10-CM

## 2024-06-18 LAB
BILIRUBIN: NEGATIVE
GLUCOSE (URINE DIPSTICK): 500 MG/DL
KETONES (URINE DIPSTICK): NEGATIVE MG/DL
MULTISTIX LOT#: ABNORMAL NUMERIC
NITRITE, URINE: POSITIVE
PH, URINE: 5.5 (ref 4.5–8)
PROTEIN (URINE DIPSTICK): NEGATIVE MG/DL
SPECIFIC GRAVITY: 1.02 (ref 1–1.03)
URINE-COLOR: YELLOW
UROBILINOGEN,SEMI-QN: 0.2 MG/DL (ref 0–1.9)

## 2024-06-18 PROCEDURE — 81002 URINALYSIS NONAUTO W/O SCOPE: CPT | Performed by: FAMILY MEDICINE

## 2024-06-18 PROCEDURE — 1159F MED LIST DOCD IN RCRD: CPT | Performed by: FAMILY MEDICINE

## 2024-06-18 PROCEDURE — 3074F SYST BP LT 130 MM HG: CPT | Performed by: FAMILY MEDICINE

## 2024-06-18 PROCEDURE — G2211 COMPLEX E/M VISIT ADD ON: HCPCS | Performed by: FAMILY MEDICINE

## 2024-06-18 PROCEDURE — 99214 OFFICE O/P EST MOD 30 MIN: CPT | Performed by: FAMILY MEDICINE

## 2024-06-18 PROCEDURE — 1160F RVW MEDS BY RX/DR IN RCRD: CPT | Performed by: FAMILY MEDICINE

## 2024-06-18 PROCEDURE — 1126F AMNT PAIN NOTED NONE PRSNT: CPT | Performed by: FAMILY MEDICINE

## 2024-06-18 PROCEDURE — 3078F DIAST BP <80 MM HG: CPT | Performed by: FAMILY MEDICINE

## 2024-06-18 RX ORDER — HYDROCODONE BITARTRATE AND ACETAMINOPHEN 5; 325 MG/1; MG/1
1 TABLET ORAL DAILY PRN
Qty: 30 TABLET | Refills: 0 | Status: SHIPPED | OUTPATIENT
Start: 2024-06-18

## 2024-06-18 RX ORDER — TERBINAFINE HYDROCHLORIDE 250 MG/1
250 TABLET ORAL DAILY
Qty: 90 TABLET | Refills: 0 | Status: SHIPPED | OUTPATIENT
Start: 2024-06-18 | End: 2024-09-16

## 2024-06-18 RX ORDER — NITROFURANTOIN 25; 75 MG/1; MG/1
100 CAPSULE ORAL 2 TIMES DAILY
Qty: 10 CAPSULE | Refills: 0 | Status: SHIPPED | OUTPATIENT
Start: 2024-06-18 | End: 2024-06-23

## 2024-06-18 RX ORDER — FLUCONAZOLE 200 MG/1
200 TABLET ORAL DAILY
Qty: 4 TABLET | Refills: 0 | Status: SHIPPED | OUTPATIENT
Start: 2024-06-18

## 2024-06-18 NOTE — PATIENT INSTRUCTIONS
PLEASE OFFICE NOTE FAX RESULTS -[phone], ATTENTION DR. WHATLEY    POR FAVOR, NOTA DE LA OFICINA ENVÍE LOS RESULTADOS POR FAX -[phone], ATENCIÓN DR. NIEVES      [place] Macrobid dos veces al día ayah 5 días para ayudar con la infección del tracto urinario.  [place] Diflucan/fluconazol ayah 2 días, espere hasta que termine el antibiótico Macrobid para la infección urinaria y luego, si es necesario y aún experimenta picazón vaginal, tome ziggy Diflucan ayah otros 2 días.    Please take Macrobid twice daily for 5 days to help with the urinary tract infection.  Please take the Diflucan/fluconazole for 2 days wait until you finish the Macrobid antibiotic for the UTI and then if needed and still experiencing vaginal itching take this Diflucan for another 2 days

## 2024-06-18 NOTE — PROGRESS NOTES
Chief Complaint   Patient presents with    Urinary Symptoms     C/o vaginal itching and dysuria x 3 months     HPI:   Jesenia Nelson is a 68 year old female who presents to clinic with complaints of urinary symptoms and vaginal itching for weeks.  Denies any fevers, chills, nausea, vomiting, flank pain.  Has history of type 2 diabetes but her eye exam is not up-to-date.  Would like to see a specialist at [facility] but does not recall the ophthalmologist name.    Does have chronic neck pain and has seen a specialist who recommended corticosteroid injections but she is not ready to do them, would like to continue taking Norco as needed for neck pain.  States that 1 refill of 30 tablets can last up to 3 months.  REVIEW OF SYSTEMS:   Negative, except per HPI.     EXAM:   BP 99/65   Pulse 67   Wt 165 lb (74.8 kg)   BMI 30.18 kg/m²   Body mass index is 30.18 kg/m².  GENERAL: well developed, well nourished, in no apparent distress  SKIN: no rashes, no suspicious lesions  HEENT: atraumatic, normocephalic  EYES: PERRLA, EOMI,conjunctiva are clear  NECK: supple, no adenopathy  LUNGS: clear to auscultation  CARDIO: RRR without murmur    ASSESSMENT AND PLAN:     1. Acute cystitis without hematuria  - POC Urinalysis, Manual Dip without microscopy [93010]  - nitrofurantoin monohydrate macro 100 MG Oral Cap; Take 1 capsule (100 mg total) by mouth 2 (two) times daily for 5 days.  Dispense: 10 capsule; Refill: 0  - Urine Culture, Routine [E]; Future  - Urine Culture, Routine [E]    2. Acute vaginitis  - fluconazole (DIFLUCAN) 200 MG Oral Tab; Take 1 tablet (200 mg total) by mouth daily.  Dispense: 4 tablet; Refill: 0    3. Neck pain  [place]  Appropriate use discussed with patient.  - HYDROcodone-acetaminophen (NORCO) 5-325 MG Oral Tab; Take 1 tablet by mouth daily as needed for Pain.  Dispense: 30 tablet; Refill: 0    4. Type 2 diabetes mellitus without complication, without long-term current use of  insulin (HCC)  - Ophthalmology Referral - In Network    5. Onychomycosis  - terbinafine 250 MG Oral Tab; Take 1 tablet (250 mg total) by mouth daily.  Dispense: 90 tablet; Refill: 0     RTC if no improvement in symptoms. Red flags discussed to go to RASHID Haro MD  6/18/2024  10:52 AM

## 2024-08-19 DIAGNOSIS — G47.9 SLEEP DISORDER: ICD-10-CM

## 2024-08-20 DIAGNOSIS — G47.9 SLEEP DISORDER: ICD-10-CM

## 2024-08-21 RX ORDER — CLONAZEPAM 0.5 MG/1
0.5 TABLET ORAL NIGHTLY
Qty: 30 TABLET | Refills: 0 | OUTPATIENT
Start: 2024-08-21

## 2024-08-21 RX ORDER — CLONAZEPAM 0.5 MG/1
0.5 TABLET ORAL NIGHTLY
Qty: 30 TABLET | Refills: 0 | Status: SHIPPED | OUTPATIENT
Start: 2024-08-21

## 2024-08-21 NOTE — TELEPHONE ENCOUNTER
Please review. Protocol Failed; No Protocol      Recent fills: 5/24/2024  Last Rx written: 5/24/2024  Last office visit: 6/18/2024      Future Appointments  Date Type Provider Dept   10/07/24 Appointment Sanam Haro MD [facility]   Showing future appointments within next 150 days with a meds authorizing provider and meeting all other requirements        Requested Prescriptions   Pending Prescriptions Disp Refills    CLONAZEPAM 0.5 MG Oral Tab [Pharmacy Med Name: CLONAZEPAM 0.5MG TABLETS] 30 tablet 0     Sig: TAKE 1 TABLET(0.5 MG) BY MOUTH EVERY NIGHT AS NEEDED       Controlled Substance Medication Failed - 8/19/2024 10:46 AM        Failed - This medication is a controlled substance - forward to provider to refill               Future Appointments         Provider Department Appt Notes    In 1 month Sanam Haro MD [facility] [place] [facility]          Recent Outpatient Visits              2 months ago Acute [facility]stit[facility] without hematuria    [facility]    Office Visit    2 months ago Varicose veins of both lower extremities with pain    [facility]    Office Visit    4 months ago Acute right-sided thoracic back pain    [facility]    Telemedicine    6 months ago [facility] for annual health examination    [facility]    Office Visit    6 months ago Neck pain    [facility] [place] [facility] [place] Anastasia, [place]    Office Visit

## 2024-08-21 NOTE — TELEPHONE ENCOUNTER
Routing to podmate due to High Priority status and Dr. Haro is out of office.    Marked High Priority, patient states out of medication

## 2024-08-22 NOTE — TELEPHONE ENCOUNTER
1. Sleep disorder  Covering for Dr Haro  Patient continues to have sleep disturbance.  Currently being treated with clonazepam half tab nightly as needed for sleep  Refill provided after  reviewed   - clonazePAM 0.5 MG Oral Tab; Take 1 tablet (0.5 mg total) by mouth nightly.  Dispense: 30 tablet; Refill: 0    KATHERINE Rodriguez

## 2024-09-07 DIAGNOSIS — E11.9 TYPE 2 DIABETES MELLITUS WITHOUT COMPLICATION, WITHOUT LONG-TERM CURRENT USE OF INSULIN (HCC): ICD-10-CM

## 2024-09-10 ENCOUNTER — TELEPHONE (OUTPATIENT)
Dept: FAMILY MEDICINE CLINIC | Facility: CLINIC | Age: 69
End: 2024-09-10

## 2024-09-10 RX ORDER — SITAGLIPTIN 100 MG/1
100 TABLET, FILM COATED ORAL DAILY
Qty: 90 TABLET | Refills: 3 | Status: SHIPPED | OUTPATIENT
Start: 2024-09-10

## 2024-09-10 NOTE — TELEPHONE ENCOUNTER
Please review; protocol failed    No active/future labs pended for HgA1c: Last completed 2/8/24 (Level: 5.7)    Future Appointments   Date Time Provider Department Center   10/7/2024  9:15 AM Sanam Haro MD [facility]     LAST OFFICE VISIT: 6/18/2024    Requested Prescriptions   Pending Prescriptions Disp Refills    JANUVIA 100 MG Oral Tab [Pharmacy Med Name: JANUVIA 100MG TABLETS] 90 tablet 3     Sig: TAKE 1 TABLET(100 MG) BY MOUTH DAILY       Diabetes Medication Protocol Failed - 9/7/2024 10:47 AM        Failed - Last A1C < 7.5 and within past 6 months     Lab Results   Component Value Date    A1C 5.7 (H) 02/08/2024             Passed - In person appointment or virtual visit in the past 6 mos or appointment in next 3 mos     Recent Outpatient Visits              2 months ago [facility] hematuria    [facility]    Office Visit    3 months ago Varicose veins of both lower extremities with pain    [facility]    Office Visit    5 months ago Acute right-sided thoracic back pain    [facility]    Telemedicine    7 months ago Encounter for annual health examination    [facility]    Office Visit    7 months ago Neck pain    [facility]    [facility] Visit          Future Appointments         Provider Department Appt Notes    In 3 weeks Sanam Haro MD [facility]                    Passed - Microalbumin procedure in past 12 months or taking ACE/ARB        Passed - EGFRCR or GFRNAA > 50     GFR Evaluation  EGFRCR: 81 , resulted on 2/8/2024          Passed - GFR in the past 12 months             Future Appointments         Provider Department Appt Notes     In 3 weeks Sanam Haro MD [facility] [place] [facility]          Recent Outpatient Visits              2 months ago Acute cystitis without hematuria    [facility]    Office Visit    3 months ago Varicose veins of both lower extremities with pain    [facility]    Office Visit    5 months ago Acute right-sided thoracic back pain    [facility] [place] [facility] Sanders Anastasia, MD    Telemedicine    7 months ago Encounter for annual health examination    [facility] [place] [facility] Sanders Anastasia, MD    Office Visit    7 months ago Neck pain    [facility]    Office Visit

## 2024-09-10 NOTE — TELEPHONE ENCOUNTER
Pt requesting refill for the following medication        JANUVIA 100 MG Oral Tab, Take 1 tablet (100 mg total) by mouth daily., Disp: 90 tablet, Rfl: 3

## 2024-09-20 DIAGNOSIS — E78.2 MIXED HYPERLIPIDEMIA: ICD-10-CM

## 2024-09-20 NOTE — TELEPHONE ENCOUNTER
Pharmacy requesting refill     PRAVASTATIN 10 MG Oral Tab TAKE 1 TABLET(10 MG) BY MOUTH EVERY NIGHT 90 tablet 3

## 2024-09-25 RX ORDER — PRAVASTATIN SODIUM 10 MG
10 TABLET ORAL NIGHTLY
Qty: 90 TABLET | Refills: 3 | Status: SHIPPED | OUTPATIENT
Start: 2024-09-25

## 2024-09-26 NOTE — TELEPHONE ENCOUNTER
Refill passed per [facility] protocol.    Requested Prescriptions   Pending Prescriptions Disp Refills    pravastatin 10 MG Oral Tab 90 tablet 3     Sig: Take 1 tablet (10 mg total) by mouth nightly.       Cholesterol Medication Protocol Passed - 9/20/2024  3:08 PM        Passed - ALT < 80     Lab Results   Component Value Date    ALT 20 02/08/2024             Passed - ALT resulted within past year        Passed - Lipid panel within past 12 months     Lab Results   Component Value Date    CHOLEST 129 02/08/2024    TRIG 85 02/08/2024    HDL 53 02/08/2024    LDL 60 02/08/2024    VLDL 12 02/08/2024    NONHDLC 76 02/08/2024             Passed - In person appointment or virtual visit in the past 12 mos or appointment in next 3 mos     Recent Outpatient Visits              3 months ago Acute cystit[facility] without hematuria    [facility]    Office Visit    4 months ago Varicose veins of both lower extremities with pain    [facility]    Office Visit    6 months ago Acute right-sided thoracic back pain    [facility]    Telemedicine    7 months ago Encounter for annual health examination    [facility]    Office Visit    7 months ago [facility]          Future Appointments         Provider Department Appt Notes    In 1 week Sanam Haro MD [facility]                       Future Appointments         Provider Department Appt Notes    In 1 week Sanam Haro MD [facility]          Recent Outpatient Visits              3 months ago Acute cystitis without hematuria    [facility] [place] [facility] [place]    Office Visit    4 months ago Varicose veins of both lower extremities with pain    [facility] Clarke Ortiz APRN    Office Visit    6 months ago Acute right-sided thoracic back pain    [facility]    Telemedicine    7 months ago Encounter for annual health examination    [facility]    Office Visit    7 months ago Neck pain    [facility]    Office Visit

## 2024-10-07 ENCOUNTER — OFFICE VISIT (OUTPATIENT)
Dept: FAMILY MEDICINE CLINIC | Facility: CLINIC | Age: 69
End: 2024-10-07

## 2024-10-07 VITALS
SYSTOLIC BLOOD PRESSURE: 117 MMHG | BODY MASS INDEX: 30 KG/M2 | WEIGHT: 164 LBS | HEART RATE: 66 BPM | DIASTOLIC BLOOD PRESSURE: 74 MMHG

## 2024-10-07 DIAGNOSIS — Z12.31 ENCOUNTER FOR SCREENING MAMMOGRAM FOR MALIGNANT NEOPLASM OF BREAST: Primary | ICD-10-CM

## 2024-10-07 DIAGNOSIS — G47.9 SLEEP DISORDER: ICD-10-CM

## 2024-10-07 DIAGNOSIS — E07.9 THYROID DISEASE: Chronic | ICD-10-CM

## 2024-10-07 DIAGNOSIS — M54.2 NECK PAIN: ICD-10-CM

## 2024-10-07 DIAGNOSIS — R25.2 MUSCLE CRAMP: ICD-10-CM

## 2024-10-07 DIAGNOSIS — N76.0 ACUTE VAGINITIS: ICD-10-CM

## 2024-10-07 DIAGNOSIS — E11.9 TYPE 2 DIABETES MELLITUS WITHOUT COMPLICATION, WITHOUT LONG-TERM CURRENT USE OF INSULIN (HCC): Chronic | ICD-10-CM

## 2024-10-07 LAB — HEMOGLOBIN A1C: 5.9 % (ref 4.3–5.6)

## 2024-10-07 RX ORDER — HYDROCODONE BITARTRATE AND ACETAMINOPHEN 5; 325 MG/1; MG/1
1 TABLET ORAL DAILY PRN
Qty: 30 TABLET | Refills: 0 | Status: SHIPPED | OUTPATIENT
Start: 2024-10-07

## 2024-10-07 RX ORDER — LEVOTHYROXINE SODIUM 25 UG/1
25 TABLET ORAL
Qty: 90 TABLET | Refills: 3 | Status: SHIPPED | OUTPATIENT
Start: 2024-10-07

## 2024-10-07 RX ORDER — CLONAZEPAM 0.5 MG/1
0.5 TABLET ORAL NIGHTLY
Qty: 30 TABLET | Refills: 0 | Status: SHIPPED | OUTPATIENT
Start: 2024-10-07

## 2024-10-07 RX ORDER — TRAZODONE HYDROCHLORIDE 50 MG/1
50 TABLET, FILM COATED ORAL NIGHTLY
Qty: 90 TABLET | Refills: 1 | Status: SHIPPED | OUTPATIENT
Start: 2024-10-07

## 2024-10-07 RX ORDER — BLOOD SUGAR DIAGNOSTIC
STRIP MISCELLANEOUS
Qty: 100 STRIP | Refills: 3 | Status: SHIPPED | OUTPATIENT
Start: 2024-10-07

## 2024-10-07 RX ORDER — FLUCONAZOLE 200 MG/1
200 TABLET ORAL DAILY
Qty: 7 TABLET | Refills: 0 | Status: SHIPPED | OUTPATIENT
Start: 2024-10-07

## 2024-10-07 RX ORDER — LANCETS 30 GAUGE
1 EACH MISCELLANEOUS DAILY
Qty: 100 EACH | Refills: 3 | Status: SHIPPED | OUTPATIENT
Start: 2024-10-07

## 2024-10-07 RX ORDER — BACLOFEN 10 MG/1
10 TABLET ORAL DAILY PRN
Qty: 60 TABLET | Refills: 1 | Status: SHIPPED | OUTPATIENT
Start: 2024-10-07

## 2024-10-07 NOTE — PROGRESS NOTES
Chief Complaint   Patient presents with    Follow - Up     DM and vaginal itching      Foot Pain     C/o right ankle pain    Derm Problem     C/o severe itching of scalp, hands and feet     HPI:   Jesenia Nelson is a 69 year old female who presents to clinic with complaints of vaginal itching and rash, insomnia    Wants to go back to work.  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  States that she has some right ankle swelling.  Thinks she may have sprained her ankle months ago.  Otherwise no obvious or acute trauma.  Has difficulty sleeping and often takes baclofen and Klonopin.  Has never tried trazodone.  Has tried other SSRIs but thinks she may have had an adverse side effect    From sertraline.  Fluoxetine is on her medication list but patient states she is not taking.  Occasionally also gets scalp paresthesias since itching in her hands, feet and on her scalp.  This only happens at night.    Vaginal itching is new and with rash, she scrubs her self so hard with a towel but sometimes she bleeds.    REVIEW OF SYSTEMS:   Negative, except per HPI.     EXAM:   /74   Pulse 66   Wt 164 lb (74.4 kg)   BMI 30.00 kg/m²   Body mass index is 30 kg/m².  GENERAL: well developed, well nourished, in no apparent distress  SKIN: no rashes, no suspicious lesions  HEENT: atraumatic, normocephalic  EYES: PERRLA, EOMI,conjunctiva are clear  NECK: supple, no adenopathy    ASSESSMENT AND PLAN:     1. [place]ck pain  [place]  Appropriate use discussed with patient.  - HYDROcodone-acetaminophen (NORCO) 5-325 MG Oral Tab; Take 1 tablet by mouth daily as needed for Pain.  Dispense: 30 tablet; Refill: 0  - baclofen 10 MG Oral Tab; Take 1 tablet (10 mg total) by mouth daily as needed. For muscle stiffness  Dispense: 60 tablet; Refill: 1    2. Sleep disorder  - clonazePAM 0.5 MG Oral Tab; Take 1 tablet (0.5 mg total) by mouth nightly.  Dispense: 30 tablet; Refill: 0  -  traZODone 50 MG Oral Tab; Take 1 tablet (50 mg total) by mouth nightly. Insomnia  Dispense: 90 tablet; Refill: 1    3. Thyroid disease  - levothyroxine 25 MCG Oral Tab; Take 1 tablet (25 mcg total) by mouth before breakfast.  Dispense: 90 tablet; Refill: 3    4. Type 2 diabetes mellitus without complication, without long-term current use of insulin (HCC)  - Glucose Blood (ONETOUCH VERIO) In Vitro Strip; Test glucose once daily  Dispense: 100 strip; Refill: 3  - Lancets (ONETOUCH DELICA PLUS [id number]) Does not apply Misc; 1 strip by In Vitro route daily.  Dispense: 100 each; Refill: 3  - POC Glycohemoglobin [88874]  - OPHTHALMOLOGY - INTERNAL    5. Encounter for screening mammogram for malignant neoplasm of breast  - [facility] CONSTANCE 2D+3D SCREENING BILAT (CPT=77067/81878); Future    6. Acute vaginitis  - fluconazole 200 MG Oral Tab; Take 1 tablet (200 mg total) by mouth daily.  Dispense: 7 tablet; Refill: 0    7. Muscle cramp  - baclofen 10 MG Oral Tab; Take 1 tablet (10 mg total) by mouth daily as needed. For muscle stiffness  Dispense: 60 tablet; Refill: 1     RTC if no improvement in symptoms. Red flags discussed to go to ER.     Sanam Haro MD  10/7/2024  9:38 AM

## 2024-10-20 DIAGNOSIS — E11.9 TYPE 2 DIABETES MELLITUS WITHOUT COMPLICATION, WITHOUT LONG-TERM CURRENT USE OF INSULIN (HCC): ICD-10-CM

## 2024-10-21 DIAGNOSIS — E11.9 TYPE 2 DIABETES MELLITUS WITHOUT COMPLICATION, WITHOUT LONG-TERM CURRENT USE OF INSULIN (HCC): ICD-10-CM

## 2024-10-21 NOTE — TELEPHONE ENCOUNTER
Refill Request:    Current Outpatient Medications   Medication Sig Dispense Refill    metFORMIN HCl 1000 MG Oral Tab Take 1 tablet (1,000 mg total) by mouth 2 (two) times daily with meals. 180 tablet 3     Please advise

## 2024-10-22 RX ORDER — LISINOPRIL 20 MG/1
20 TABLET ORAL DAILY
Qty: 90 TABLET | Refills: 3 | OUTPATIENT
Start: 2024-10-22

## 2024-10-22 RX ORDER — DAPAGLIFLOZIN 10 MG/1
10 TABLET, FILM COATED ORAL DAILY
Qty: 90 TABLET | Refills: 3 | Status: SHIPPED | OUTPATIENT
Start: 2024-10-22

## 2024-10-22 NOTE — TELEPHONE ENCOUNTER
Refill passed per [facility] protocols.    Requested Prescriptions   Pending Prescriptions Disp Refills    FARXIGA 10 MG Oral Tab [Pharmacy Med Name: FARXIGA 10MG TABLETS] 90 tablet 3     Sig: Take 1 tablet (10 mg total) by mouth daily.       Diabetes Medication Protocol Passed - 10/22/2024  1:33 PM        Passed - Last A1C < 7.5 and within past 6 months     Lab Results   Component Value Date    A1C 5.9 (A) 10/07/2024             Passed - In person appointment or virtual visit in the past 6 mos or appointment in next 3 mos     Recent Outpatient Visits              2 weeks ago Encounter for screening mammogram for malignant neoplasm of breast    [facility] [place] An[facility]    Office Visit    4 months ago Acute cystitis without hematuria    [facility]    Office Visit    5 months ago Varicose veins of both lower extremities with pain    [facility] [place] [place] Patrick, APRN    Office Visit    6 months ago Acute right-sided thoracic back pain    [facility]    Telemedicine    8 months ago Encounter for annual health examination    [facility]    [facility] Visit          Future Appointments         Provider Department Appt Notes    In 3 weeks HARJINDER DEXA RM1; HARJINDER OLVERA RM1 [facility] Mammography - Mookie                     Passed - Microalbumin procedure in past 12 months or taking ACE/ARB        Passed - EGFRCR or GFRNAA > 50     GFR Evaluation  EGFRCR: 81 , resulted on 2/8/2024          Passed - GFR in the past 12 months         Refused Prescriptions Disp Refills    lisinopril 20 MG Oral Tab [Pharmacy Med Name: LISINOPRIL 20MG TABLETS] 90 tablet 3     Sig: Take 1 tablet (20 mg total) by mouth daily.       Hypertension Medications Protocol Passed -  10/22/2024  1:33 PM        Passed - CMP or BMP in past 12 months        Passed - Last BP reading less than 140/90     BP Readings from Last 1 Encounters:   10/07/24 117/74               Passed - In person appointment or virtual visit in the past 12 mos or appointment in next 3 mos     Recent Outpatient Visits              2 weeks ago [facility] for screening [facility] for malignant neoplasm of breast    [facility]    Office Visit    4 months ago [facility]stit[facility] without hematuria    [facility]    Office Visit    5 months ago Varicose veins of both lower extremities with pain    [facility], [facility]    Office Visit    6 months ago Acute right-sided thoracic back pain    [facility]    Telemedicine    8 months ago Encounter for annual health examination    [facility]    [facility]          Future Appointments         Provider Department Appt Notes    In 3 weeks HARJINDER FENTON RM1; HARJINDER OLVERA RM1 [facility] Mammography - [facility]                     Passed - EGFRCR or GFRNAA > 50     GFR Evaluation  EGFRCR: 81 , resulted on 2/8/2024

## 2024-10-22 NOTE — TELEPHONE ENCOUNTER
Year supply of refills good until 3/2025  Outpatient Medication Detail   Disp Refills Start End    lisinopril 20 MG Oral Tab 90 tablet 3 3/1/2024 --    Sig - Route: Take 1 tablet (20 mg total) by mouth daily. - Oral    Sent to pharmacy as: Lisinopril 20 MG Oral Tablet (Prinivil; Zestril)    E-Prescribing Status: Receipt confirmed by pharmacy (3/1/2024  1:59 PM CST)    Pharmacy  [id number] [place] [address] [address], [phone], [phone]

## 2024-10-23 NOTE — TELEPHONE ENCOUNTER
Refill Per Protocol     Requested Prescriptions   Pending Prescriptions Disp Refills    metFORMIN HCl 1000 MG Oral Tab 180 tablet 3     Sig: Take 1 tablet (1,000 mg total) by mouth 2 (two) times daily with meals.       Diabetes Medication Protocol Passed - 10/23/2024 10:15 AM        Passed - Last A1C < 7.5 and within past 6 months     Lab Results   Component Value Date    A1C 5.9 (A) 10/07/2024             Passed - In person appointment or virtual visit in the past 6 mos or appointment in next 3 mos     Recent Outpatient Visits              2 weeks ago Encounter for screening mammogram for malignant neoplasm of breast    [facility]    Office Visit    4 months ago Acute cystitis without hematuria    [facility]    Office Visit    5 months ago Varicose veins of both lower extremities with pain    [facility]    Office Visit    6 months ago Acute right-sided thoracic back pain    [facility]    Telemedicine    8 months ago Encounter for annual health examination    [facility]    Office Visit          Future Appointments         Provider Department Appt Notes    In 3 weeks HARJINDER DEXA RM1; HAR[facility]ER [facility]                     Passed - Microalbumin procedure in past 12 months or taking ACE/ARB        Passed - EGFRCR or GFRNAA > 50     GFR Evaluation  EGFRCR: 81 , resulted on 2/8/2024          Passed - GFR in the past 12 months               Future Appointments         Provider Department Appt Notes    In 3 weeks JAYASHREEO DEXA RM1; HARJINDER [facility] RM1 [facility]           Recent Outpatient Visits              2 weeks ago Encounter for screening mammogram for malignant  neoplasm of breast    [facility]    Office Visit    4 months ago Acute cystitis without hematuria    [facility]    Office Visit    5 months ago Varicose veins of both lower extremities with pain    [facility] Patrick, [facility]    Office Visit    6 months ago Acute right-sided thoracic back pain    [facility]    Telemedicine    8 months ago Encounter for annual health examination    [facility]    Office Visit

## 2024-11-16 DIAGNOSIS — E11.9 TYPE 2 DIABETES MELLITUS WITHOUT COMPLICATION, WITHOUT LONG-TERM CURRENT USE OF INSULIN (HCC): ICD-10-CM

## 2024-11-18 DIAGNOSIS — E11.9 TYPE 2 DIABETES MELLITUS WITHOUT COMPLICATION, WITHOUT LONG-TERM CURRENT USE OF INSULIN (HCC): ICD-10-CM

## 2024-11-18 RX ORDER — SITAGLIPTIN 100 MG/1
100 TABLET, FILM COATED ORAL DAILY
Qty: 90 TABLET | Refills: 3 | Status: CANCELLED | OUTPATIENT
Start: 2024-11-18

## 2024-11-18 RX ORDER — LISINOPRIL 20 MG/1
20 TABLET ORAL DAILY
Qty: 90 TABLET | Refills: 3 | Status: CANCELLED | OUTPATIENT
Start: 2024-11-18

## 2024-11-21 RX ORDER — LISINOPRIL 20 MG/1
20 TABLET ORAL DAILY
Qty: 90 TABLET | Refills: 3 | OUTPATIENT
Start: 2024-11-21

## 2024-12-01 ENCOUNTER — TELEPHONE (OUTPATIENT)
Age: 69
End: 2024-12-01

## 2024-12-06 ENCOUNTER — TELEPHONE (OUTPATIENT)
Age: 69
End: 2024-12-06

## 2024-12-06 NOTE — TELEPHONE ENCOUNTER
Estamos tratando de comunicarnos con usted de  [facility] Navigation department,  para asistirle en connectarse con servicios para [place] cuidado. Pr favor llamenos al [phone], o en situaciones mas urgentes puede llamar  24-hour help line at [phone]. Esperamos poder atenderle pronto

## 2024-12-30 ENCOUNTER — HOSPITAL ENCOUNTER (OUTPATIENT)
Dept: MAMMOGRAPHY | Age: 69
Discharge: HOME | End: 2024-12-30
Attending: FAMILY MEDICINE
Payer: MEDICARE

## 2024-12-30 DIAGNOSIS — Z12.31 ENCOUNTER FOR SCREENING MAMMOGRAM FOR MALIGNANT NEOPLASM OF BREAST: ICD-10-CM

## 2024-12-30 PROCEDURE — 77063 BREAST TOMOSYNTHESIS BI: CPT | Performed by: FAMILY MEDICINE

## 2024-12-30 PROCEDURE — 77067 SCR MAMMO BI INCL CAD: CPT | Performed by: FAMILY MEDICINE

## 2025-02-18 ENCOUNTER — TELEPHONE (OUTPATIENT)
Age: 70
End: 2025-02-18

## 2025-02-18 NOTE — TELEPHONE ENCOUNTER
Therapy Resources:    [facility]  [address], [address], [place], [address]  Phone: [phone]  [email or web address]    Lorrie Cage LCSW  [facility]  [address], [address], [place], [place], [address]  Phone: [phone]  [email or web address]     [facility]  [address], [address], [place], [place], [address]  Phone: [phone]  [email or web address]     TRACIE Guy  [address], [address], [place], [address]  Phone: [phone]  [email or web address]

## 2025-02-24 ENCOUNTER — TELEPHONE (OUTPATIENT)
Dept: FAMILY MEDICINE CLINIC | Facility: CLINIC | Age: 70
End: 2025-02-24

## 2025-02-24 DIAGNOSIS — M54.2 NECK PAIN: ICD-10-CM

## 2025-02-24 RX ORDER — HYDROCODONE BITARTRATE AND ACETAMINOPHEN 5; 325 MG/1; MG/1
1 TABLET ORAL DAILY PRN
Qty: 30 TABLET | Refills: 0 | Status: SHIPPED | OUTPATIENT
Start: 2025-02-24

## 2025-02-24 RX ORDER — FLUOXETINE 10 MG/1
CAPSULE ORAL
Qty: 60 CAPSULE | Refills: 2 | OUTPATIENT
Start: 2025-02-24

## 2025-02-24 NOTE — TELEPHONE ENCOUNTER
Recent Fills: 10/07/2024    Last Rx Written: 10/07/2024    Last Office Visit: 11/21/2024  Future Appointments   Date Time Provider Department Center   2/25/2025  9:00 AM Clarke Alexander APRN [facility]   4/21/2025  2:00 PM [facility]

## 2025-02-24 NOTE — TELEPHONE ENCOUNTER
Office visit on 11/21/2024 with Dr. Haro  2. Other depression  Starting new med needs to see cbt   - escitalopram 10 MG Oral Tab; Take 1 tablet (10 mg total) by mouth daily.  Dispense: 90 tablet; Refill: 0  - [facility] Referral - In Network    Office visit on 10/07/2024 with Dr. Haro:  From sertraline. Fluoxetine is on her medication list but patient states she is not taking.

## 2025-02-24 NOTE — TELEPHONE ENCOUNTER
Patient would like a refill on Rx hydrocodone-acetaminophen 5-325MG for 30 tablets. Patient said she is out of medication. Please advise     Verified pharmacy yaima Nichols         Current Outpatient Medications   Medication Sig Dispense Refill                                       HYDROcodone-acetaminophen (NORCO) 5-325 MG Oral Tab Take 1 tablet by mouth daily as needed for Pain. 30 tablet 0

## 2025-02-24 NOTE — TELEPHONE ENCOUNTER
reports patient was told by Shani that there are no refills available on Lisinopril 20 mg.  Called [facility]ani Rueda, Lisinopril prescription has refills available and will be ready today.  Language Line  # [phone]:  Attempted  to call patient regarding above, voicemail box is full and unable to leave a message.   Patient will be notified by pharmacy when medication is ready for

## 2025-02-24 NOTE — PROGRESS NOTES
Subjective:   Jesenia Nelson is a 69 year old female who presents for Medication Request (Lisinopril & norco )   Patient is a pleasant 69-year-old female past medical history consistent for osteoarthritis, reflux, hypertension, thyroid disease, DM2, hyperlipidemia, depression, sleep disorder, and vitamin D deficiency.  Patient presents to office for follow up on blood pressure and refills.  Patient states she is here for refill and check on BP meds. She has been out of meds for 2 weeks. BP lower side of normal today. Denies dizziness, sob, HA, dizziness. Checks BP at home and typically 110/70. Will decrease lisinopril to 10 mg q day. Also having hand arthritis and pain. Advised norco was refilled. Educated on hefareed nodes. Trail Voltaren gel.         Past Medical History:    Arthritis    Esophageal reflux    Essential hypertension    Thyroid disease    Type 2 diabetes mellitus without complication, without long-term current use of insulin (HCC)      Past Surgical History:   Procedure Laterality Date    Cholecystectomy      Colonoscopy  2014    Knee replacement surgery      bilateral knee replacement        History/Other:    Chief Complaint Reviewed and Verified  Nursing Notes Reviewed and   Verified  Tobacco Reviewed  Allergies Reviewed  Medications Reviewed    Problem List Reviewed  Medical History Reviewed  Surgical History   Reviewed  OB Status Reviewed  Family History Reviewed  Social History   Reviewed         Tobacco:  She smoked tobacco in the past but quit greater than 12 months ago.  Social History     Tobacco Use   Smoking Status Former    Types: Cigarettes    Passive exposure: Never   Smokeless Tobacco Never   Tobacco Comments    2 cig a week         Current Outpatient Medications   Medication Sig Dispense Refill    FLUoxetine 10 MG Oral Cap       lisinopril 10 MG Oral Tab Take 1 tablet (10 mg total) by mouth daily. 90 tablet 1    JANUVIA 100 MG Oral Tab Take 1 tablet (100 mg total) by  mouth daily. 90 tablet 3    FARXIGA 10 MG Oral Tab Take 1 tablet (10 mg total) by mouth daily. 90 tablet 3    metFORMIN HCl 1000 MG Oral Tab Take 1 tablet (1,000 mg total) by mouth 2 (two) times daily with meals. 180 tablet 3    clonazePAM 0.5 MG Oral Tab Take 1 tablet (0.5 mg total) by mouth nightly. 30 tablet 0    levothyroxine 25 MCG Oral Tab Take 1 tablet (25 mcg total) by mouth before breakfast. 90 tablet 3    Glucose Blood (ONETOUCH VERIO) In Vitro Strip Test glucose once daily 100 strip 3    Lancets (ONETOUCH DELICA PLUS OPEYBD16G) Does not apply Misc 1 strip by In Vitro route daily. 100 each 3    baclofen 10 MG Oral Tab Take 1 tablet (10 mg total) by mouth daily as needed. For muscle stiffness 60 tablet 1    pravastatin 10 MG Oral Tab Take 1 tablet (10 mg total) by mouth nightly. 90 tablet 3    diclofenac 1 % External Gel Apply 2 g topically 4 (four) times daily as needed. 100 g 3    fenofibrate 145 MG Oral Tab Take 1 tablet (145 mg total) by mouth nightly. 90 tablet 3    Fluocinonide 0.05 % External Solution APPLY TOPICALLY TO THE SCALP AT BEDTIME AS DIRECTED      OTEZLA 30 MG Oral Tab       ketoconazole 2 % External Shampoo Apply topically twice a week.      Fluocinolone Acetonide Scalp 0.01 % External Oil APPLY TO SCALP 3 NIGHTS PER WEEK AND OCCLUDE WITH SHOWERCAP . SHAMPOO IN THE MORNING      Blood Glucose Monitoring Suppl w/Device Does not apply Kit Use as directed Dx E11.9 non-insulin 1 kit 0    Multiple Vitamin (MULTI-VITAMIN DAILY OR) 1 TABLET DAILY      HYDROcodone-acetaminophen (NORCO) 5-325 MG Oral Tab Take 1 tablet by mouth daily as needed for Pain. (Patient not taking: Reported on 2/25/2025) 30 tablet 0    escitalopram 10 MG Oral Tab Take 1 tablet (10 mg total) by mouth daily. (Patient not taking: Reported on 2/25/2025) 90 tablet 0    traZODone 50 MG Oral Tab Take 1 tablet (50 mg total) by mouth nightly. Insomnia 90 tablet 1    Acetaminophen 500 MG Oral Cap Take two (1000mg) by mouth three times  daily. (Patient not taking: Reported on 2/25/2025) 100 capsule 0         Review of Systems:  Review of Systems   Constitutional:  Negative for chills and fever.   Respiratory:  Negative for cough and shortness of breath.    Cardiovascular:  Negative for chest pain.   Musculoskeletal:  Positive for arthralgias and joint swelling.   Neurological:  Negative for dizziness, weakness and headaches.         Objective:   BP 90/63 (BP Location: Right arm, Patient Position: Sitting, Cuff Size: large)   Pulse 88   Ht 5' 2\" (1.575 m)   Wt 159 lb 6.4 oz (72.3 kg)   SpO2 91%   BMI 29.15 kg/m²  Estimated body mass index is 29.15 kg/m² as calculated from the following:    Height as of this encounter: 5' 2\" (1.575 m).    Weight as of this encounter: 159 lb 6.4 oz (72.3 kg).  Physical Exam  Vitals and nursing note reviewed.   Constitutional:       Appearance: Normal appearance. She is normal weight.   Cardiovascular:      Rate and Rhythm: Normal rate and regular rhythm.      Pulses: Normal pulses.      Heart sounds: Normal heart sounds. No murmur heard.  Pulmonary:      Effort: Pulmonary effort is normal.      Breath sounds: Normal breath sounds.   Musculoskeletal:         General: Swelling present.      Comments: Heberdens nodes all distal phlange   Skin:     General: Skin is warm and dry.      Capillary Refill: Capillary refill takes less than 2 seconds.   Neurological:      Mental Status: She is alert and oriented to person, place, and time.           Assessment & Plan:   1. Essential hypertension (Primary)  -     Comp Metabolic Panel (14); Future; Expected date: 02/25/2025  -     Microalb/Creat Ratio, Random Urine; Future; Expected date: 02/25/2025  -     Lisinopril; Take 1 tablet (10 mg total) by mouth daily.  Dispense: 90 tablet; Refill: 1  2. Heberden's node    1. Essential hypertension  BP low side of normal  Renal protection with DM  Decrease to 10 mg q day  Follow up physical for bp check   Check cmp and urine  microalbumin   - Comp Metabolic Panel (14) [E]; Future  - Microalb/Creat Ratio, Random Urine; Future  - lisinopril 10 MG Oral Tab; Take 1 tablet (10 mg total) by mouth daily.  Dispense: 90 tablet; Refill: 1    2. Hehalimaden's node  Nodes to all distal phlanges  Educated on arthritis and OTC medications  Recommendation for voltaren gel    Patient aware of plan of care. All questions answered to satisfaction of the patient. Patient instructed to call office or reach out via [facility] if any issues arise. For urgent issues and/or reviewed red flags please proceed to the urgent care or ER.  Also, inform the nurse practitioner with any new symptoms or medication side effects.            Return in about 6 months (around 8/25/2025) for Annual physical.    KATHERINE Rodriguez, 2/24/2025, 7:46 AM

## 2025-02-25 ENCOUNTER — OFFICE VISIT (OUTPATIENT)
Dept: FAMILY MEDICINE CLINIC | Facility: CLINIC | Age: 70
End: 2025-02-25

## 2025-02-25 VITALS
HEIGHT: 62 IN | HEART RATE: 88 BPM | DIASTOLIC BLOOD PRESSURE: 63 MMHG | SYSTOLIC BLOOD PRESSURE: 90 MMHG | OXYGEN SATURATION: 91 % | BODY MASS INDEX: 29.33 KG/M2 | WEIGHT: 159.38 LBS

## 2025-02-25 DIAGNOSIS — I10 ESSENTIAL HYPERTENSION: Primary | Chronic | ICD-10-CM

## 2025-02-25 DIAGNOSIS — M15.1 HEBERDEN'S NODE: ICD-10-CM

## 2025-02-25 PROCEDURE — 1125F AMNT PAIN NOTED PAIN PRSNT: CPT

## 2025-02-25 PROCEDURE — 3074F SYST BP LT 130 MM HG: CPT

## 2025-02-25 PROCEDURE — 1159F MED LIST DOCD IN RCRD: CPT

## 2025-02-25 PROCEDURE — 3078F DIAST BP <80 MM HG: CPT

## 2025-02-25 PROCEDURE — 3008F BODY MASS INDEX DOCD: CPT

## 2025-02-25 PROCEDURE — 1160F RVW MEDS BY RX/DR IN RCRD: CPT

## 2025-02-25 PROCEDURE — 99213 OFFICE O/P EST LOW 20 MIN: CPT

## 2025-02-25 RX ORDER — FLUOXETINE 10 MG/1
CAPSULE ORAL
COMMUNITY
Start: 2025-01-21

## 2025-02-25 RX ORDER — LISINOPRIL 10 MG/1
10 TABLET ORAL DAILY
Qty: 90 TABLET | Refills: 1 | Status: SHIPPED | OUTPATIENT
Start: 2025-02-25 | End: 2025-08-24

## 2025-04-04 DIAGNOSIS — E78.2 MIXED HYPERLIPIDEMIA: ICD-10-CM

## 2025-04-04 DIAGNOSIS — F32.89 OTHER DEPRESSION: ICD-10-CM

## 2025-04-08 ENCOUNTER — APPOINTMENT (OUTPATIENT)
Dept: GENERAL RADIOLOGY | Age: 70
End: 2025-04-08
Attending: PHYSICIAN ASSISTANT
Payer: MEDICARE

## 2025-04-08 ENCOUNTER — HOSPITAL ENCOUNTER (OUTPATIENT)
Age: 70
Discharge: HOME | End: 2025-04-08
Payer: MEDICARE

## 2025-04-08 VITALS
RESPIRATION RATE: 12 BRPM | TEMPERATURE: 98 F | OXYGEN SATURATION: 97 % | HEART RATE: 79 BPM | SYSTOLIC BLOOD PRESSURE: 122 MMHG | DIASTOLIC BLOOD PRESSURE: 67 MMHG

## 2025-04-08 DIAGNOSIS — T14.90XA BLUNT TRAUMA: ICD-10-CM

## 2025-04-08 DIAGNOSIS — S93.491A SPRAIN OF ANTERIOR TALOFIBULAR LIGAMENT OF RIGHT ANKLE, INITIAL ENCOUNTER: ICD-10-CM
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
## 2025-04-08 DIAGNOSIS — R07.89 RIGHT-SIDED CHEST WALL PAIN: Primary | ICD-10-CM

## 2025-04-08 PROCEDURE — 73610 X-RAY EXAM OF ANKLE: CPT | Performed by: PHYSICIAN ASSISTANT

## 2025-04-08 PROCEDURE — 71101 X-RAY EXAM UNILAT RIBS/CHEST: CPT | Performed by: PHYSICIAN ASSISTANT

## 2025-04-08 PROCEDURE — 99214 OFFICE O/P EST MOD 30 MIN: CPT | Performed by: PHYSICIAN ASSISTANT

## 2025-04-08 PROCEDURE — A6449 LT COMPRES BAND >=3" <5"/YD: HCPCS | Performed by: PHYSICIAN ASSISTANT

## 2025-04-08 RX ORDER — HYDROCODONE BITARTRATE AND ACETAMINOPHEN 5; 325 MG/1; MG/1
1 TABLET ORAL EVERY 8 HOURS PRN
Qty: 8 TABLET | Refills: 0 | Status: SHIPPED | OUTPATIENT
Start: 2025-04-08

## 2025-04-08 RX ORDER — LIDOCAINE 50 MG/G
1 PATCH TOPICAL EVERY 24 HOURS
Qty: 30 PATCH | Refills: 0 | Status: SHIPPED | OUTPATIENT
Start: 2025-04-08

## 2025-04-08 RX ORDER — ACETAMINOPHEN 500 MG
500 TABLET ORAL EVERY 4 HOURS PRN
Qty: 40 TABLET | Refills: 0 | Status: SHIPPED | OUTPATIENT
Start: 2025-04-08

## 2025-04-08 RX ORDER — PRAVASTATIN SODIUM 10 MG
10 TABLET ORAL NIGHTLY
Qty: 90 TABLET | Refills: 3 | OUTPATIENT
Start: 2025-04-08

## 2025-04-08 RX ORDER — ESCITALOPRAM OXALATE 10 MG/1
10 TABLET ORAL DAILY
Qty: 90 TABLET | Refills: 0 | Status: SHIPPED | OUTPATIENT
Start: 2025-04-08

## 2025-04-08 NOTE — ED PROVIDER NOTES
Patient Seen in: [facility]      History     Chief Complaint   Patient presents with    Fall     Stated Complaint: fall/ right side pain    Subjective:   HPI      Patient is a 69-year-old female with hypertension, thyroid disease, osteoarthritis, esophageal reflux disease, type 2 diabetes, presenting to immediate care for evaluation of acute right-sided rib pain status post mechanical fall from standing 3 days ago.  Patient states she bumped and tripped on a small cooler on the ground and fell forward.  Since has been experiencing right anterior chest wall pain with focal rib tenderness.  No swelling.  No bruising.  Pain with palpation and taking deep breaths.  In addition endorsing right lateral ankle pain and swelling.  She denies any lightheadedness or dizziness or confusion.  No midline neck or back pain.  Ambulating without difficulty.  Has not taken anything for symptoms.  No other injuries or concerns.    Objective:     No pertinent past medical history.            No pertinent past surgical history.              No pertinent social history.            Review of Systems   Constitutional:  Positive for activity change.   Respiratory:  Negative for shortness of breath.    Cardiovascular:  Negative for chest pain.   Gastrointestinal:  Negative for abdominal pain, nausea and vomiting.   Musculoskeletal:  Positive for joint swelling. Negative for back pain, gait problem and neck pain.        Right chest wall pain, right ankle pain and swelling   Skin:  Negative for color change, rash and wound.   Neurological:  Negative for weakness and numbness.   Psychiatric/Behavioral:  Negative for confusion.        Positive for stated complaint: fall/ right side pain  Other systems are as noted in HPI.  Constitutional and vital signs reviewed.      All other systems reviewed and negative except as noted above.    Physical Exam     ED Triage Vitals [04/08/25 1319]   /67   Pulse 79   Resp 12   Temp 98.1 °F  (36.7 °C)   Temp src Oral   SpO2 97 %   O2 Device None (Room air)       Current Vitals:   Vital Signs  BP: 122/67  Pulse: 79  Resp: 12  Temp: 98.1 °F (36.7 °C)  Temp src: Oral    Oxygen Therapy  SpO2: 97 %  O2 Device: None (Room air)        Physical Exam  Vitals and nursing note reviewed.   Constitutional:       General: She is not in acute distress.     Appearance: Normal appearance. She is well-developed. She is not ill-appearing, toxic-appearing or diaphoretic.   HENT:      Head: Normocephalic and atraumatic.      Comments: Normocephalic, nontender.  No hematoma.     Mouth/Throat:      Mouth: Mucous membranes are moist.      Comments: Small area of ecchymosis at right lower lip.  No hematoma.  No trismus or drooling.  Eyes:      General: No scleral icterus.  Neck:      Comments: No midline cervical tenderness  Cardiovascular:      Rate and Rhythm: Normal rate and regular rhythm.   Pulmonary:      Effort: Pulmonary effort is normal. No respiratory distress.      Comments: Right chest wall tenderness.  No hematoma.  No crepitus.  No ecchymosis.  No rash.  Chest:      Chest wall: Tenderness present.   Musculoskeletal:         General: No tenderness or deformity. Normal range of motion.      Cervical back: Normal range of motion. No rigidity.      Comments: No midline spinal tenderness.  Moving all extremities full range of motion.    Tenderness to palpation right lateral malleolus with soft tissue swelling.  No midfoot tenderness.  Normal range of motion.  Compartments soft.  Pulse intact.   Skin:     Findings: No rash.   Neurological:      General: No focal deficit present.      Mental Status: She is alert and oriented to person, place, and time.      Motor: No weakness.   Psychiatric:         Mood and Affect: Mood normal.         Behavior: Behavior normal.           ED Course   Labs Reviewed - No data to display  XR ANKLE (MIN 3 VIEWS), RIGHT (CPT=73610)   Final Result   PROCEDURE: XR ANKLE (MIN 3 VIEWS), RIGHT  (CPT=73610)       COMPARISON: None.       INDICATIONS: Post fall x 2 days. Pain and swelling in lateral side of    right ankle       Findings and impression:       Normal alignment.  No acute fracture.  Moderate calcaneal spur and    enthesophyte.   Finalized by (CST): Jefry Martínez MD on 4/08/2025 at 2:09 PM                        XR RIBS WITH CHEST (3 VIEWS), RIGHT  (CPT=71101)   Final Result   PROCEDURE: XR RIBS WITH CHEST (3 VIEWS), RIGHT (CPT=71101)       COMPARISON: None.       INDICATIONS: Post fall x 2 days. Right upper anterior and lateral rib pain       Findings and impression:       Normal heart size.  No consolidation.  No pneumothorax or significant    pleural effusion        No displaced right rib fracture   Finalized by (CST): Jefry Martínez MD on 4/08/2025 at 2:07 PM                               MDM     Patient is a 69-year-old female, presenting to immediate care for evaluation of acute right-sided chest wall and rib pain status post mechanical fall.  In addition endorsing right lateral ankle pain with swelling.  Coming to immediate care for initial evaluation.  Has not taken thing for pain.  Pain predominately right-sided of ribs.  No chest pain or shortness of breath.  Patient is well-appearing, hemodynamically stable.  Tenderness to palpation right anterior and lateral ribs without obvious deformity.  There is trace soft tissue swelling without hematoma or crepitus.  Patient with normal cardiac and legs.  Abdomen soft nontender.  Does have tenderness to palpation right lateral ankle with soft tissue swelling without obvious deformity.  Compartments soft.  Normal range of motion.  Normal gait.  Further evaluation with x-ray imaging of right ribs; negative for rib fracture clinically question.  No pneumothorax.  No pulmonary contusion.  No cardiopulmonary process.  Exam and history consistent with right rib contusion and chest wall pain right-sided.  X-ray imaging of right ankle obtained negative  for fracture or dislocation.  Moderate calcaneal spur present.  Exam and history consistent with right ankle sprain.  Will treat outpatient supportively.  Ace wrap provided for comfort right ankle sprain.  Will prescribe acetaminophen, lidocaine patches, Norco as needed for pain.  Further PCP follow-up.       Medical Decision Making      Disposition and Plan     Clinical Impression:  1. Right-sided chest wall pain    2. Fall, initial encounter    3. Sprain of anterior talofibular ligament of right ankle, initial encounter    4. Blunt trauma         Disposition:  Discharge  4/8/2025  2:26 pm    Follow-up:  Sanam Haro MD  [address] [place] [address]  [address]  [place] [id number]  [phone]                Medications Prescribed:  Current Discharge Medication List        START taking these medications    Details   acetaminophen 500 MG Oral Tab Take 1 tablet (500 mg total) by mouth every 4 (four) hours as needed for Pain.  Qty: 40 tablet, Refills: 0      lidocaine 5 % External Patch Place 1 patch onto the skin daily.  Qty: 30 patch, Refills: 0    Comments: If lidocaine patches 5% not covered by insurance.  Please instruct patient for present lidocaine patches and/aspercreme available over-the-counter as alternative.  Associated Diagnoses: Fall, initial encounter; Right-sided chest wall pain; Blunt trauma      !! HYDROcodone-acetaminophen 5-325 MG Oral Tab Take 1 tablet by mouth every 8 (eight) hours as needed for Pain.  Qty: 8 tablet, Refills: 0    Associated Diagnoses: Fall, initial encounter; Right-sided chest wall pain; Blunt trauma       !! - Potential duplicate medications found. Please discuss with provider.              Supplementary Documentation:

## 2025-04-08 NOTE — TELEPHONE ENCOUNTER
Refill passed per Clinic protocol.    Short supply given to patient- please advise if refill is appropriate.     Future Appointments   Date Time Provider Department Center   4/21/2025  2:00 PM Sanam Haro MD [facility]       Requested Prescriptions   Pending Prescriptions Disp Refills    ESCITALOPRAM 10 MG Oral Tab [Pharmacy Med Name: ESCITALOPRAM 10MG TABLETS] 90 tablet 0     Sig: TAKE 1 TABLET(10 MG) BY MOUTH DAILY       Psychiatric Non-Scheduled (Anti-Anxiety) Passed - 4/8/2025 10:10 AM        Passed - In person appointment or virtual visit in the past 6 mos or appointment in next 3 mos     Recent Outpatient Visits              [facility]    Office Visit    4 months ago Other depression    [facility]    Office Visit    6 months ago Encounter for screening mammogram for malignant neoplasm of breast    [facility]    Office Visit    9 months ago Acute cystitis without hematuria    [facility]    Office Visit    10 months ago Varicose veins of both lower extremities with pain    [facility]    Office Visit          Future Appointments         Provider Department Appt Notes    In 1 week [facility]                    Passed - Depression Screening completed within the past 12 months        Passed - Medication is active on med list         Refused Prescriptions Disp Refills    PRAVASTATIN 10 MG Oral Tab [Pharmacy Med Name: PRAVASTATIN 10MG TABLETS] 90 tablet 3     Sig: TAKE 1 TABLET(10 MG) BY MOUTH EVERY NIGHT       Cholesterol Medication Protocol Failed - 4/8/2025  10:10 AM        Failed - ALT < 80     Lab Results   Component Value Date    ALT 20 02/08/2024             Failed - ALT resulted within past year        Failed - Lipid panel within past 12 months     Lab Results   Component Value Date    CHOLEST 129 02/08/2024    TRIG 85 02/08/2024    HDL 53 02/08/2024    LDL 60 02/08/2024    VLDL 12 02/08/2024    NONHDLC 76 02/08/2024             Passed - In person appointment or virtual visit in the past 12 mos or appointment in next 3 mos     Recent Outpatient Visits              1 month [facility]    Office Visit    4 months ago Other depression    [facility]    Office Visit    6 months ago [facility] neoplasm [facility]    Office Visit    9 months ago Acute cystitis without hematuria    [facility]    Office Visit    10 months ago Varicose veins of both lower extremities with pain    [facility]    Office Visit          Future Appointments         Provider Department Appt Notes    In 1 week [facility] Lst px 2/13/24                    Passed - Medication is active on med list

## 2025-04-18 ENCOUNTER — TELEPHONE (OUTPATIENT)
Dept: FAMILY MEDICINE CLINIC | Facility: CLINIC | Age: 70
End: 2025-04-18

## 2025-04-18 NOTE — TELEPHONE ENCOUNTER
2nd attempt was made to reschedule patient's appointment on 4/21. Provider will be out of the office and appointment needs to be rescheduled. Ok to use res24, please assist with rescheduling.

## 2025-06-17 ENCOUNTER — HOSPITAL ENCOUNTER (EMERGENCY)
Facility: HOSPITAL | Age: 70
Discharge: HOME | End: 2025-06-17
Attending: EMERGENCY MEDICINE
Payer: MEDICARE

## 2025-06-17 ENCOUNTER — OFFICE VISIT (OUTPATIENT)
Dept: FAMILY MEDICINE CLINIC | Facility: CLINIC | Age: 70
End: 2025-06-17
Payer: COMMERCIAL

## 2025-06-17 VITALS
SYSTOLIC BLOOD PRESSURE: 134 MMHG | BODY MASS INDEX: 30 KG/M2 | HEART RATE: 67 BPM | DIASTOLIC BLOOD PRESSURE: 88 MMHG | WEIGHT: 163 LBS

## 2025-06-17 VITALS
SYSTOLIC BLOOD PRESSURE: 152 MMHG | HEART RATE: 64 BPM | OXYGEN SATURATION: 99 % | RESPIRATION RATE: 18 BRPM | DIASTOLIC BLOOD PRESSURE: 88 MMHG | TEMPERATURE: 98 F

## 2025-06-17 DIAGNOSIS — G54.2 CERVICAL NERVE ROOT IMPINGEMENT: ICD-10-CM

## 2025-06-17 DIAGNOSIS — M54.2 NECK ARTHRALGIA: ICD-10-CM

## 2025-06-17 DIAGNOSIS — I10 ESSENTIAL HYPERTENSION: Chronic | ICD-10-CM

## 2025-06-17 DIAGNOSIS — M48.02 FORAMINAL STENOSIS OF CERVICAL REGION: ICD-10-CM

## 2025-06-17 DIAGNOSIS — M43.6 NECK STIFFNESS: ICD-10-CM

## 2025-06-17 DIAGNOSIS — M43.6 TORTICOLLIS: ICD-10-CM

## 2025-06-17 DIAGNOSIS — E07.9 THYROID DISEASE: Chronic | ICD-10-CM

## 2025-06-17 DIAGNOSIS — D69.2 SENILE PURPURA: ICD-10-CM

## 2025-06-17 DIAGNOSIS — I83.813 VARICOSE VEINS OF BOTH LOWER EXTREMITIES WITH PAIN: ICD-10-CM

## 2025-06-17 DIAGNOSIS — E11.9 TYPE 2 DIABETES MELLITUS WITHOUT COMPLICATION, WITHOUT LONG-TERM CURRENT USE OF INSULIN (HCC): Chronic | ICD-10-CM

## 2025-06-17 DIAGNOSIS — E55.9 VITAMIN D DEFICIENCY: ICD-10-CM

## 2025-06-17 DIAGNOSIS — E78.2 MIXED HYPERLIPIDEMIA: ICD-10-CM

## 2025-06-17 DIAGNOSIS — J41.0 SMOKERS' COUGH (HCC): Chronic | ICD-10-CM

## 2025-06-17 DIAGNOSIS — M54.2 NECK PAIN: ICD-10-CM

## 2025-06-17 DIAGNOSIS — G47.9 SLEEP DISORDER: ICD-10-CM

## 2025-06-17 DIAGNOSIS — M54.6 ACUTE RIGHT-SIDED THORACIC BACK PAIN: ICD-10-CM

## 2025-06-17 DIAGNOSIS — M17.0 PRIMARY OSTEOARTHRITIS OF BOTH KNEES: ICD-10-CM

## 2025-06-17 DIAGNOSIS — M19.049 ARTHRITIS OF HAND, UNSPECIFIED LATERALITY: ICD-10-CM

## 2025-06-17 DIAGNOSIS — Z00.00 ENCOUNTER FOR ANNUAL HEALTH EXAMINATION: Primary | ICD-10-CM

## 2025-06-17 DIAGNOSIS — M43.6 TORTICOLLIS: Primary | ICD-10-CM

## 2025-06-17 DIAGNOSIS — M79.10 MYALGIA: ICD-10-CM

## 2025-06-17 DIAGNOSIS — M53.3 COCCYX PAIN: Chronic | ICD-10-CM

## 2025-06-17 DIAGNOSIS — K21.9 GASTROESOPHAGEAL REFLUX DISEASE WITHOUT ESOPHAGITIS: ICD-10-CM

## 2025-06-17 PROCEDURE — 1159F MED LIST DOCD IN RCRD: CPT | Performed by: FAMILY MEDICINE

## 2025-06-17 PROCEDURE — 3079F DIAST BP 80-89 MM HG: CPT | Performed by: FAMILY MEDICINE

## 2025-06-17 PROCEDURE — 1126F AMNT PAIN NOTED NONE PRSNT: CPT | Performed by: FAMILY MEDICINE

## 2025-06-17 PROCEDURE — 99499 UNLISTED E&M SERVICE: CPT | Performed by: FAMILY MEDICINE

## 2025-06-17 PROCEDURE — 99284 EMERGENCY DEPT VISIT MOD MDM: CPT

## 2025-06-17 PROCEDURE — 99283 EMERGENCY DEPT VISIT LOW MDM: CPT

## 2025-06-17 PROCEDURE — 96372 THER/PROPH/DIAG INJ SC/IM: CPT | Performed by: FAMILY MEDICINE

## 2025-06-17 PROCEDURE — 3075F SYST BP GE 130 - 139MM HG: CPT | Performed by: FAMILY MEDICINE

## 2025-06-17 PROCEDURE — G0439 PPPS, SUBSEQ VISIT: HCPCS | Performed by: FAMILY MEDICINE

## 2025-06-17 PROCEDURE — 96160 PT-FOCUSED HLTH RISK ASSMT: CPT | Performed by: FAMILY MEDICINE

## 2025-06-17 PROCEDURE — 1160F RVW MEDS BY RX/DR IN RCRD: CPT | Performed by: FAMILY MEDICINE

## 2025-06-17 PROCEDURE — 1170F FXNL STATUS ASSESSED: CPT | Performed by: FAMILY MEDICINE

## 2025-06-17 RX ORDER — ACETAMINOPHEN 500 MG
1000 TABLET ORAL ONCE
Status: COMPLETED | OUTPATIENT
Start: 2025-06-17 | End: 2025-06-17

## 2025-06-17 RX ORDER — HYDROCODONE BITARTRATE AND ACETAMINOPHEN 5; 325 MG/1; MG/1
1 TABLET ORAL EVERY 6 HOURS PRN
Qty: 15 TABLET | Refills: 0 | Status: SHIPPED | OUTPATIENT
Start: 2025-06-17 | End: 2025-06-17

## 2025-06-17 RX ORDER — KETOROLAC TROMETHAMINE 30 MG/ML
60 INJECTION, SOLUTION INTRAMUSCULAR; INTRAVENOUS ONCE
Status: COMPLETED | OUTPATIENT
Start: 2025-06-17 | End: 2025-06-17

## 2025-06-17 RX ORDER — CYCLOBENZAPRINE HCL 10 MG
10 TABLET ORAL 3 TIMES DAILY PRN
Qty: 12 TABLET | Refills: 0 | Status: SHIPPED | OUTPATIENT
Start: 2025-06-17 | End: 2025-06-24

## 2025-06-17 RX ORDER — LIDOCAINE 4 G/G
1 PATCH TOPICAL EVERY 24 HOURS
Qty: 15 PATCH | Refills: 0 | Status: SHIPPED | OUTPATIENT
Start: 2025-06-17

## 2025-06-17 RX ORDER — HYDROCODONE BITARTRATE AND ACETAMINOPHEN 5; 325 MG/1; MG/1
1 TABLET ORAL EVERY 6 HOURS PRN
Qty: 15 TABLET | Refills: 0 | Status: SHIPPED | OUTPATIENT
Start: 2025-06-17 | End: 2025-06-22

## 2025-06-17 RX ORDER — LIDOCAINE 50 MG/G
1 PATCH TOPICAL EVERY 24 HOURS
Qty: 15 PATCH | Refills: 0 | Status: SHIPPED | OUTPATIENT
Start: 2025-06-17

## 2025-06-17 RX ORDER — LIDOCAINE 50 MG/G
1 PATCH TOPICAL EVERY 24 HOURS
Qty: 10 PATCH | Refills: 0 | Status: SHIPPED | OUTPATIENT
Start: 2025-06-17 | End: 2025-06-27

## 2025-06-17 RX ORDER — DEXAMETHASONE 4 MG/1
8 TABLET ORAL ONCE
Status: COMPLETED | OUTPATIENT
Start: 2025-06-17 | End: 2025-06-17

## 2025-06-17 RX ADMIN — KETOROLAC TROMETHAMINE 60 MG: 30 INJECTION, SOLUTION INTRAMUSCULAR; INTRAVENOUS at 10:01:00

## 2025-06-17 NOTE — PATIENT INSTRUCTIONS
MISBAH MATEUS PARA EL MRI DEL IVANIA Y TRATE DE HACER MATEUS CON EL DR. BEHAR PARA REVISAR RESULTADOS.     MISBAH MATEUS CON LA DRA. QUINTERO PARA EL ARTRITIS DE [place] ( REPLAZAR AL DR. MAYNARD)    MISBAH LABORATORIOS OTRO PENNIE

## 2025-06-17 NOTE — ED INITIAL ASSESSMENT (HPI)
Patient presents to ED for stiff neck that started when she woke up at 2am. Patient reports she cannot move her head side to side.

## 2025-06-17 NOTE — PROGRESS NOTES
Subjective:   Jesenia Nelson is a 69 year old female who presents for a MA AHA (Medicare Advantage Annual Health Assessment) and Subsequent Annual Wellness visit (Pt already had Initial Annual Wellness) and scheduled follow up of multiple significant but stable problems.             Patient seen in the emergency room this morning for stiff neck and inability to move her head laterally.  She woke up with the pain.  Was given prescription for Norco, lidocaine patch and Flexeril.      History/Other:   Fall Risk Assessment:   She has been screened for Falls and is low risk.      Cognitive Assessment:normal      Functional Ability/Status:   Jesenia Nelson has a completely normal functional assessment. See flowsheet for details.      Depression Screening (PHQ):  PHQ-2 SCORE: 0  , done 6/17/2025   Last [place] Suicide Screening on 6/17/2025 was No Risk.          Advanced Directives:   She does NOT have a Living Will. [Do you have a living will?: No]  She does NOT have a Power of  for Health Care. [Do you have a healthcare power of ?: No]  Discussed Advance Care Planning with patient (and family/surrogate if present). Standard forms made available to patient in After Visit Summary.      Problem List[1]  Allergies:  She has no active allergies.    Current Medications:  Active Meds, Sig Only[2]    Medical History:  She  has a past medical history of Arthritis, Esophageal reflux, Essential hypertension, Thyroid disease, and Type 2 diabetes mellitus without complication, without long-term current use of insulin ([facility]) (1/24/2020).  Surgical History:  She  has a past surgical history that includes knee replacement surgery; cholecystectomy; and colonoscopy (2014).   Family History:  Her family history includes Breast Cancer in her maternal aunt; Other in her maternal aunt.  Social History:  She  reports that she has quit smoking. Her smoking use included cigarettes. She has never been exposed to tobacco smoke.  She has never used smokeless tobacco. She reports current alcohol use. She reports that she does not use drugs.    Tobacco:  She smoked tobacco in the past but quit greater than 12 months ago.  Tobacco Use[3]     CAGE Alcohol Screen:   CAGE screening score of 0 on 6/17/2025, showing low risk of alcohol abuse.      Patient Care Team:  Sanam Haro MD as PCP - General (Family Medicine)  Lukasz Talbert MD (Anesthesiology)    Review of Systems     Negative except PER HPI     Objective:   Physical Exam  General appearance: alert  HEENT: Normocephalic, without obvious abnormality, atraumatic. PERRL, EOMI, pink conjunctiva.   Neck: supple, symmetrical, trachea midline, no adenopathy, no JVD and thyroid not enlarged,  Lungs: respirations unlabored, clear to auscultation bilaterally  Heart: regular rate and rhythm, S1, S2 normal, no murmur  Abdomen: normoactive bowel sounds x4; soft, non-distended; nontender; no masses  Extremities: extremities normal, atraumatic, no cyanosis or edema; no erythema or tenderness in calves bilaterally  Neurologic: alert, oriented x3      /85   Pulse 67   Wt 163 lb (73.9 kg)   BMI 29.81 kg/m²  Estimated body mass index is 29.81 kg/m² as calculated from the following:    Height as of 2/25/25: 5' 2\" (1.575 m).    Weight as of this encounter: 163 lb (73.9 kg).    Medicare Hearing Assessment:   Hearing Screening    Time taken: 6/17/2025  9:31 AM  Screening Method: Questionnaire  I have a problem hearing over the telephone: No I have trouble following the conversations when two or more people are talking at the same time: No   I have trouble understanding things on the TV: No I have to strain to understand conversations: No   I have to worry about missing the telephone ring or doorbell: No I have trouble hearing conversations in a noisy background such as a crowded room or restaurant: No   I get confused about where sounds come from: No I misunderstand some words in a sentence and  need to ask people to repeat themselves: No   I especially have trouble understanding the speech of women and children: No I have trouble understanding the speaker in a large room such as at a meeting or place of [employer]: No   Many people I talk to seem to mumble (or don't speak clearly): No People get annoyed because I misunderstand what they say: No   I misunderstand what others are saying and make inappropriate responses: No I avoid social activities because I cannot hear well and fear I will reply improperly: No   Family members and friends have told me they think I may have hearing loss: No               Vision testing not required for subsequent Medicare annual exam      Assessment & Plan:   Jesenia Nelson is a 69 year old female who presents for a Medicare Assessment.     1. Encounter for annual health examination  -Medical, surgical and social history, as well as medications and allergies were reviewed with patient.  - CBC With Differential With Platelet; Future  - Comp Metabolic Panel (14); Future  - Hemoglobin A1C; Future  - Lipid Panel; Future  - TSH W Reflex To Free T4; Future  - Microalb/Creat Ratio, Random Urine [E]; Future    2. Mixed hyperlipidemia  - Stable condition, continue present management.      3. Smokers' cough (HCC)  - Stable condition, continue present management.      4. Type 2 diabetes mellitus without complication, without long-term current use of insulin (HCC)  - Stable condition, continue present management.    - Microalb/Creat Ratio, Random Urine [E]; Future    5. Senile purpura  - Stable condition, continue present management.      6. Varicose veins of both lower extremities with pain  - Stable condition, continue present management.    - SPECIALTY (OTHER) - EXTERNAL    7. Essential hypertension  - Stable condition, continue present management.      8. Thyroid disease  - Stable condition, continue present management.      9. Vitamin D deficiency  - Stable condition, continue  present management.      10. Gastroesophageal reflux disease without esophagitis  - Stable condition, continue present management.      11. Acute right-sided thoracic back pain  - Stable condition, continue present management.      12. Coccyx pain  - Stable condition, continue present management.      13. Arthritis of hand, unspecified laterality  - Stable condition, continue present management.    - Rheumatology Referral - [place] ([facility])    14. Neck pain  Acute on chronic issue  - lidocaine 4 % External Patch; Place 1 patch onto the skin daily.  Dispense: 15 patch; Refill: 0  - lidocaine 5 % External Patch; Place 1 patch onto the skin daily.  Dispense: 15 patch; Refill: 0  - ketorolac (Toradol) 60 MG/2ML IM injection 60 mg  - MRI SPINE CERVICAL (CPT=72141); Future    15. Primary osteoarthritis of both knees  - Physiatry Referral - [facility])    16. Sleep disorder  - Stable condition, continue present management.      17. Torticollis  [place].  Appropriate use discussed with patient.  - HYDROcodone-acetaminophen 5-325 MG Oral Tab; Take 1 tablet by mouth every 6 (six) hours as needed.  Dispense: 15 tablet; Refill: 0  - MRI SPINE CERVICAL (CPT=72141); Future  - Physiatry Referral - [place] [facility])    18. Neck arthralgia    - MRI SPINE CERVICAL (CPT=72141); Future  - Physiatry Referral - [place] ([facility])    19. Foraminal stenosis of cervical region    - MRI SPINE CERVICAL (CPT=72141); Future  - Physiatry Referral - [place] [facility])    20. Cervical nerve root impingement    - MRI SPINE CERVICAL (CPT=72141); Future  - Physiatry Referral - [place] [facility])    21. Myalgia    - MRI SPINE CERVICAL (CPT=72141); Future  - Physiatry Referral - [place] ([facility])    22. Neck stiffness    - MRI SPINE CERVICAL (CPT=72141); Future  - Physiatry Referral - [place] ([facility])            The patient indicates  understanding of these issues and agrees to the plan.  Reinforced healthy diet, lifestyle, and exercise.      No follow-ups on file.     Sanam Haro MD, 6/17/2025     Supplementary Documentation:   General Health:  In the past six months, have you lost more than 10 pounds without trying?: 2 - No  Has your appetite been poor?: No  Type of Diet: Low Carb  How does the patient maintain a good energy level?: Other  How would you describe your daily physical activity?: Light  How would you describe your current health state?: Fair  How do you maintain positive mental well-being?: Visiting Family  On a scale of 0 to 10, with 0 being no pain and 10 being severe pain, what is your pain level?: 0 - (None)  In the past six months, have you experienced urine leakage?: 1-Yes  At any time do you feel concerned for the safety/well-being of yourself and/or your children, in your home or elsewhere?: No  Have you had any immunizations at another office such as Influenza, Hepatitis B, Tetanus, or Pneumococcal?: No    Health Maintenance   Topic Date Due    Diabetes Care Foot Exam  Never done    Zoster Vaccines (1 of 2) Never done    COVID-19 Vaccine (4 - 2024-25 season) 09/01/2024    Annual Well Visit  01/01/2025    Diabetes Care: Microalb/Creat Ratio (Annual)  01/01/2025    Diabetes Care: GFR  02/08/2025    Diabetes Care A1C  04/07/2025    Influenza Vaccine (Season Ended) 10/01/2025    Diabetes Care Dilated Eye Exam  10/29/2025    Mammogram  12/30/2025    Colorectal Cancer Screening  02/16/2026    DEXA Scan  Completed    Annual Depression Screening  Completed    Fall Risk Screening (Annual)  Completed    Pneumococcal Vaccine: 50+ Years  Completed    Meningococcal B Vaccine  Aged Out            [1]   Patient Active Problem List  Diagnosis    Smokers' cough (HCC)    Type 2 diabetes mellitus without complication, without long-term current use of insulin (HCC)    Esophageal reflux    Essential hypertension    Thyroid disease     Mixed hyperlipidemia    Vitamin D deficiency    Varicose veins of lower extremity    Senile purpura    Hand arthritis    Coccyx pain    Primary osteoarthritis of both knees    Neck pain    Acute right-sided thoracic back pain    Sleep disorder   [2]   Outpatient Medications Marked as Taking for the 6/17/25 encounter (Office Visit) with Sanam Haro MD   Medication Sig    cyclobenzaprine 10 MG Oral Tab Take 1 tablet (10 mg total) by mouth 3 (three) times daily as needed for Muscle spasms.    lidocaine 5 % External Patch Place 1 patch onto the skin daily for 10 days.    lidocaine 4 % External Patch Place 1 patch onto the skin daily.    lidocaine 5 % External Patch Place 1 patch onto the skin daily.    HYDROcodone-acetaminophen 5-325 MG Oral Tab Take 1 tablet by mouth every 6 (six) hours as needed.    escitalopram 10 MG Oral Tab Take 1 tablet (10 mg total) by mouth daily.    acetaminophen 500 MG Oral Tab Take 1 tablet (500 mg total) by mouth every 4 (four) hours as needed for Pain.    lidocaine 5 % External Patch Place 1 patch onto the skin daily.    FLUoxetine 10 MG Oral Cap     lisinopril 10 MG Oral Tab Take 1 tablet (10 mg total) by mouth daily.    JANUVIA 100 MG Oral Tab Take 1 tablet (100 mg total) by mouth daily.    FARXIGA 10 MG Oral Tab Take 1 tablet (10 mg total) by mouth daily.    metFORMIN HCl 1000 MG Oral Tab Take 1 tablet (1,000 mg total) by mouth 2 (two) times daily with meals.    clonazePAM 0.5 MG Oral Tab Take 1 tablet (0.5 mg total) by mouth nightly.    levothyroxine 25 MCG Oral Tab Take 1 tablet (25 mcg total) by mouth before breakfast.    baclofen 10 MG Oral Tab Take 1 tablet (10 mg total) by mouth daily as needed. For muscle stiffness    traZODone 50 MG Oral Tab Take 1 tablet (50 mg total) by mouth nightly. Insomnia    pravastatin 10 MG Oral Tab Take 1 tablet (10 mg total) by mouth nightly.    diclofenac 1 % External Gel Apply 2 g topically 4 (four) times daily as needed.    fenofibrate 145 MG  Oral Tab Take 1 tablet (145 mg total) by mouth nightly.    Fluocinonide 0.05 % External Solution APPLY TOPICALLY TO THE SCALP AT BEDTIME AS DIRECTED    OTEZLA 30 MG Oral Tab     ketoconazole 2 % External Shampoo Apply topically twice a week.    Fluocinolone Acetonide Scalp 0.01 % External Oil APPLY TO SCALP 3 NIGHTS PER WEEK AND OCCLUDE WITH SHOWERCAP . SHAMPOO IN THE MORNING    Multiple Vitamin (MULTI-VITAMIN DAILY OR) 1 TABLET DAILY     Current Facility-Administered Medications for the 6/17/25 encounter (Office Visit) with Sanam Haro MD   Medication    ketorolac (Toradol) 60 MG/2ML IM injection 60 mg   [3]   Social History  Tobacco Use   Smoking Status Former    Types: Cigarettes    Passive exposure: Never   Smokeless Tobacco Never   Tobacco Comments    2 cig a week

## 2025-06-17 NOTE — ED PROVIDER NOTES
Patient Seen in: [facility] Emergency Department    History     Chief Complaint   Patient presents with    Neck Pain     Stated Complaint: left side neck pain    HPI    Chief complaint: Neck Pain    History of present illness:  The patient complains of neck pain, that began this morning.   Pain is described as sharp and radiating, pain is worse with movement bending and is improved with remaining still.  Currently rated 8/10.     A review of pertinent red flag issues reveals no history of fever, IV drug use, urinary retention, history of immunosuppressive therapy, history of cancer or weight loss. No saddle anesthesia, perianal numbness, stool/bladder incontinence.      Past Medical History[1]    Past Surgical History[2]         Family History[3]    Short Social Hx on File[4]    Review of Systems    Positive for stated complaint: left side neck pain  Other systems are as noted in HPI.  Constitutional and vital signs reviewed.      All other systems reviewed and negative except as noted above.    PSFH elements reviewed from today and agreed except as otherwise stated in HPI.    Physical Exam     ED Triage Vitals [06/17/25 0703]   BP (!) 166/91   Pulse 66   Resp 18   Temp 97.7 °F (36.5 °C)   Temp src Temporal   SpO2 98 %   O2 Device None (Room air)       Current:BP (!) 166/91   Pulse 66   Temp 97.7 °F (36.5 °C) (Temporal)   Resp 18   SpO2 98%     PULSE OX nl      General: Patient appears in mild distress, slow to transition  Neck: tender l cervical mid lower perispinal region, dec l lat rotation  Lungs: no resp distress  CV: rr  Back: no midline tenderness  Neuro: Patient is alert and oriented x3, able to ambulate with steady gait, 5 out of 5 strength bilateral upper extremities.  Sensation intact .  2+ DTRs of the bicep bilateral.  Extremities:Nontender full range of motion s, no edema,          ED Course   Labs Reviewed - No data to display    MDM         Medical Decision Making  Problems  Addressed:  Torticollis: acute illness or injury     Details: Muscle relaxant, steroids    Amount and/or Complexity of Data Reviewed  Discussion of management or test interpretation with external provider(s): Tylenol, motrin recommended.      Risk  OTC drugs.  Prescription drug management.              Disposition and Plan     Clinical Impression:  1. Torticollis        Disposition:  There is no disposition on file for this visit.    Follow-up:  Roger Hardy MD  [address] [facility] [address]  [facility] [address]  [phone]    Follow up        Medications Prescribed:  Current Discharge Medication List        START taking these medications    Details   cyclobenzaprine 10 MG Oral Tab Take 1 tablet (10 mg total) by mouth 3 (three) times daily as needed for Muscle spasms.  Qty: 12 tablet, Refills: 0             Present on Admission:  **None**                 [1]   Past Medical History:   Arthritis    Esophageal reflux    Essential hypertension    Thyroid disease    Type 2 diabetes mellitus without complication, without long-term current use of insulin (HCC)   [2]   Past Surgical History:  Procedure Laterality Date    Cholecystectomy      Colonoscopy  2014    Knee replacement surgery      bilateral knee replacement   [3]   Family History  Problem Relation Age of Onset    Breast Cancer Maternal Aunt     Other (Other) Maternal Aunt         Rheumatoid Arthritis   [4]   Social History  Socioeconomic History    Marital status:    Tobacco Use    Smoking status: Former     Types: Cigarettes     Passive exposure: Never    Smokeless tobacco: Never    Tobacco comments:     2 cig a week    Vaping Use    Vaping status: Never Used   Substance and Sexual Activity    Alcohol use: Yes     Comment: Occasionally     Drug use: Never     Social Drivers of Health      [facility]

## 2025-06-18 ENCOUNTER — TELEPHONE (OUTPATIENT)
Dept: FAMILY MEDICINE CLINIC | Facility: CLINIC | Age: 70
End: 2025-06-18

## 2025-07-11 ENCOUNTER — TELEPHONE (OUTPATIENT)
Dept: FAMILY MEDICINE CLINIC | Facility: CLINIC | Age: 70
End: 2025-07-11

## 2025-07-11 DIAGNOSIS — G47.9 SLEEP DISORDER: ICD-10-CM

## 2025-07-15 NOTE — TELEPHONE ENCOUNTER
For replies, please route to pool: [facility] CENTRAL REFILLS    Please review: medication fails/has no protocol attached.  No future appointments with primary care medicine

## 2025-07-17 RX ORDER — TRAZODONE HYDROCHLORIDE 50 MG/1
50 TABLET ORAL NIGHTLY PRN
Qty: 90 TABLET | Refills: 1 | Status: SHIPPED | OUTPATIENT
Start: 2025-07-17

## 2025-07-17 NOTE — TELEPHONE ENCOUNTER
Please verify which SSRI/SNRI pt taking, she's requesting trazodone but on other ssri (2) pt reported?

## 2025-08-25 DIAGNOSIS — I10 ESSENTIAL HYPERTENSION: Chronic | ICD-10-CM

## 2025-08-25 DIAGNOSIS — M43.6 TORTICOLLIS: ICD-10-CM

## 2025-08-29 RX ORDER — LISINOPRIL 10 MG/1
10 TABLET ORAL DAILY
Qty: 90 TABLET | Refills: 1 | OUTPATIENT
Start: 2025-08-29

## 2025-08-29 RX ORDER — LISINOPRIL 10 MG/1
10 TABLET ORAL DAILY
Qty: 90 TABLET | Refills: 1 | Status: SHIPPED | OUTPATIENT
Start: 2025-08-29 | End: 2026-02-25

## 2025-08-29 RX ORDER — HYDROCODONE BITARTRATE AND ACETAMINOPHEN 5; 325 MG/1; MG/1
1 TABLET ORAL EVERY 6 HOURS PRN
Qty: 15 TABLET | Refills: 0 | Status: SHIPPED | OUTPATIENT
Start: 2025-08-29 | End: 2025-09-03

## (undated) NOTE — LETTER
7/21/2025    Jeseniapreston Nelson  [address]  [facility] [address]         Estimado/a Jesenia,    Le enviamos esta carta para informarle que nuestra oficina tavera intentado ponerse en contacto con usted por teléfono sin éxito. El motivo de la llamada era para informarle sobre un mensaje de [place] doctor...   Por favor, comuníquese con nuestra oficina llamando al número ubicado debajo para hablar sobre ziggy flakita y para realizar los cambios necesarios a [place]ffield información de contacto en nuestro sistema. Cristobal por [place]ffield ayuda.    Sinceramente,    Sanam Haro MD  [address] [place] [id number]  Ph: [phone]  Fax: [phone]         Document electronically generated by:  Nelli GOODE RN

## (undated) NOTE — LETTER
03/11/21        Merissa Quarles 7342      Dear Umang Duncan records indicate that you have outstanding lab work and or testing that was ordered for you and has not yet been completed:  Orders Placed This Encounter

## (undated) NOTE — LETTER
2/15/2020              Carol Poster        [address] [facility]         [place],    Fue un placer verla. Davis resultado del [place] es normal. No necesita más pruebas en ziggy momento. Espero verla en davis próxima fabian.

## (undated) NOTE — LETTER
11/11/21        Abiola Quarles 9279      Dear Venkata Montoya records indicate that you have outstanding lab work and or testing that was ordered for you and has not yet been completed:  Orders Placed This Encounter

## (undated) NOTE — LETTER
5/4/2023              Bassam Mann        [address] [address]         To whom it may concern,    Bassam Mann is currently a patient under my medical care. Patient will be traveling to [place] starting May 12, 2023 and come back on May 20, 2023. Due to her history of low back pain and bilateral knee implants she has a hard time standing for long periods of time. If she is having increased pain, please allow her to use a wheelchair. If you require additional information please do not hesitate to contact my office.         Sincerely,     DO [facility] [address] [address] [phone]        Document electronically generated by:  Jermaine Hyman DO

## (undated) NOTE — LETTER
Date & Time: 12/16/2022, 2:08 PM  Patient: Fiorella Reese  Encounter Provider(s):    KATHERINE Arteaga       To Whom It May Concern:    Fiorella Reese was seen and treated in our department on 12/16/2022. She should not return to work until 12/19/2022. If you have any questions or concerns, please do not hesitate to call.        _____________________________  Lavell Song.  Yamilex Bodily

## (undated) NOTE — LETTER
06/24/21        Matt Quarles 7342      Dear Bharti Fuel records indicate that you have outstanding lab work and or testing that was ordered for you and has not yet been completed:  Orders Placed This Encounter

## (undated) NOTE — LETTER
12/11/20        Valentin Quarles 7342      Dear Nicole Eagle records indicate that you have outstanding lab work and or testing that was ordered for you and has not yet been completed:  Orders Placed This Encounter

## (undated) NOTE — LETTER
06/15/21    Chante Quarles 7342      Dear Shona Wilson records indicate that you have outstanding lab work and or testing that was ordered for you and has not yet been completed:  Orders Placed This Encounter      Vitam